# Patient Record
Sex: FEMALE | Race: WHITE | Employment: FULL TIME | URBAN - METROPOLITAN AREA
[De-identification: names, ages, dates, MRNs, and addresses within clinical notes are randomized per-mention and may not be internally consistent; named-entity substitution may affect disease eponyms.]

---

## 2017-01-20 ENCOUNTER — HOSPITAL ENCOUNTER (OUTPATIENT)
Dept: PHYSICAL THERAPY | Age: 47
Discharge: HOME | End: 2017-01-20
Payer: COMMERCIAL

## 2017-01-20 PROCEDURE — 97162 PT EVAL MOD COMPLEX 30 MIN: CPT

## 2017-01-20 PROCEDURE — 97530 THERAPEUTIC ACTIVITIES: CPT

## 2017-01-20 NOTE — PROGRESS NOTES
PT DAILY TREATMENT NOTE/HIP EVAL3-16    Patient Name: Terrie Gudino  Date:2017  : 1970  [x]  Patient  Verified  Payor: BLUE CROSS / Plan: Christina Cavazos [id number] PPO / Product Type: PPO /    In time:3:24pm  Out time:3:53pm  Total Treatment Time (min): 29  Total Timed Codes (min): 9  1:1 Treatment Time ( only): NA   Visit #: 1     Treatment Area: Fibromyalgia [M79.7]    SUBJECTIVE  Pain Level (0-10 scale): 7-8  [x]constant []intermittent []improving [x]worsening []no change since onset    Any medication changes, allergies to medications, adverse drug reactions, diagnosis change, or new procedure performed?: [x] No    [] Yes (see summary sheet for update)  Subjective functional status/changes: Reports increase in pain post lumbar fusion after a fall in  with progression to widespread pain. Reports recent diagnosis of fibromyalgia per her PCP in . Reports she is also seeing pain management. Has undergone multiple rounds of PT in the past for widespread pain but reports this is before she was officially diagnosed with fibromyalgia. Reports she now is on a prednisone dose pack which has taken some pain off of recent flare up over the last several months. Reports R now she has pain throughout both hands limiting ease of writing, burning pain in her buttocks, pain with breathing, \"every piece of my body is in pain\". Reports exhaustion and inability to sleep.        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 Barriers: [x]pain [x]financial [x]time []transportation []other  Motivation: Reports desire to be able to care and involve her self in activity with children. Also reports she is in process of getting disability as she has had too much difficulty working d/t pain. Substance use: []Alcohol []Tobacco []other:   FABQ Score: []low []elevate  Cognition: A & O x 4    Other:    OBJECTIVE/EXAMINATION  Domestic Life: see above  Activity/Recreational Limitations: see above  Mobility: see above  Self Care: see above    20 min [x]Eval                  []Re-Eval     9 min Therapeutic Activity:  []  See flow sheet :   Rationale: pt education regarding relevant diagnosis, prognosis, plan of care, extensive education regarding gradual progression of exercise predominantly focusing on aerobic activity to help address paibn control in setting of fibromyalgia  to improve the patients ability to improve pt self management.           With   [] TE   [] TA   [] neuro   [] other: Patient Education: [x] Review HEP    [] Progressed/Changed HEP based on:   [] positioning   [] body mechanics   [] transfers   [] heat/ice application    [] other:      Other Objective/Functional Measures:     Physical Therapy Evaluation- Hip    Posture:    Gait:  [x] Normal    [] Abnormal    [] Antalgic    [] NWB    Device:    Describe:         ROM/Strength        AROM                     PROM        Strength (1-5)  Hip Left Right Left Right Left Right   Flexion         Extension         Abduction         Adduction         ER         IR         Knee Left Right Left Right Left Right   Extension         Flexion         Bilat LE and UE strength grossly 4+/5 with WNL ankle, knee, hip, and trunk ROM, but reports frequent pain and cramping during gross assessment     Flexibility: [] Unable to assess at this time  Hamstrings:    (L) Tightness= [x] WNL   [] Min   [] Mod   [] Severe    (R) Tightness= [x] WNL   [] Min   [] Mod   [] Severe  Quadriceps:    (L) Tightness= [x] WNL   [] Min   [] Mod   [] Severe    (R) Tightness= [x] WNL   [] Min   [] Mod   [] Severe  Gastroc:    (L) Tightness= [x] WNL   [] Min   [] Mod   [] Severe    (R) Tightness= [x] WNL   [] Min   [] Mod   [] Severe                                  Palpation  [] Min  [] Mod  [] Severe    Location: widespread moderate to severe TTP, non-specific, bilat LE, UE and trunk  [] Min  [] Mod  [] Severe    Location:  [] Min  [] Mod  [] Severe    Location:    Optional Tests  Jae/ Jodee Test: [x] Neg    [] Pos  Merrick Test:  [] Neg    [] Pos  Scouring Test: [] Neg    [] Pos  Trendelenberg:  [] Neg    [] Pos [] Left    [] Right  OberTest:   [] Neg    [] Pos  Ely's Test:  [] Neg    [] Pos  Piriformis Test:  [] Neg    [] Pos hip pain provocation bilat  Sub-talor alignment: [] Neurtral [] Pronation [] Supination  Forefoot alignment: [] Neutral [] Varus [] Valgus  Functional Leg Length (cm):   Right:  Left:  Discrepancy:    Other tests/ comments:  5x sit to stand: 24 seconds with reports of leg fatigue     WNL gait pattern    Pain Level (0-10 scale) post treatment: 7-8    ASSESSMENT/Changes in Function: Per POC. Patient will continue to benefit from skilled PT services to modify and progress therapeutic interventions, address functional mobility deficits, address strength deficits, analyze and cue movement patterns, analyze and modify body mechanics/ergonomics and instruct in home and community integration to attain remaining goals. [x]  See Plan of Care  []  See progress note/recertification  []  See Discharge Summary         Progress towards goals / Updated goals:  Per POC.     PLAN  [x]  Upgrade activities as tolerated     [x]  Continue plan of care  []  Update interventions per flow sheet       []  Discharge due to:_  []  Other:_      Amando Blackwell, PT, DPT, ATC, CSCS 1/20/2017  3:23 PM

## 2017-01-20 NOTE — PROGRESS NOTES
[facility] at [facility], [address] [place] [address]  Phone: [phone]   Fax: [phone]    Plan of Care/ Statement of Necessity for Physical Therapy Services     Patient name: Liliya Connolly Start of Care: 2017   Referral source: Danyell Gray,* : 1970    Medical Diagnosis: Fibromyalgia [M79.7]   Onset Date:, exacerbation over the last few year, mostly the last few months    Treatment Diagnosis: fibromyalgia   Prior Hospitalization: see medical history Provider#: [id number]   Medications: Verified on Patient summary List    Comorbidities: fibromyalgia, psoriatic arthritis, osteoarthritis, Fusion L4-L5 after fall off pogo stick in , 3x surgeries in R foot/ankle with bunionectomy and surgery involving her medial arch and R ankle (unspecified), latex allergy, mitral valve prolapse with PVC   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 Assessment/ key information: Pt is a 48year old female presenting with c/o widespread pain reporting \"every piece of my body is in pain\" that has worsened since initial onset in  reporting recent flare up over the last few months. Pt reports she has been diagnosed with Fibromyalgia and c/o widespread pain, fatigue, limited activity tolerance d/t pain, and exacerbation of pain with work and taking care of her children. She demonstrates grossly WNL strength and ROM during examination, but demonstrates difficulty with sit to stand transfers d/t reported fatigue demonstrating score of 24 seconds and reports pain typically 7-8/10. Pt will benefit from skilled PT to begin with aquatics and progress at therapists discretion to improve activity tolerance, reduce pain, and improve QOL.     Evaluation Complexity History MEDIUM  Complexity : 1-2 comorbidities / personal factors will impact the outcome/ POC ; Examination LOW Complexity : 1-2 Standardized tests and measures addressing body structure, function, activity limitation and / or participation in recreation  ;Presentation MEDIUM Complexity : Evolving with changing characteristics  ; Clinical Decision Making MEDIUM Complexity : FOTO score of 26-74  Overall Complexity Rating: MEDIUM  Problem List: pain affecting function, decrease ADL/ functional abilitiies, decrease activity tolerance and decrease transfer abilities   Treatment Plan may include any combination of the following: Therapeutic exercise, Therapeutic activities, Neuromuscular re-education, Physical agent/modality, Manual therapy, Aquatic therapy, Patient education and Self Care training  Patient / Family readiness to learn indicated by: asking questions and interest  Persons(s) to be included in education: patient (P)  Barriers to Learning/Limitations: None  Patient Goal (s): less pain  Patient Self Reported Health Status: poor to fair  Rehabilitation Potential: fair    Short Term Goals: To be accomplished in 2 weeks:   1. Pt will demonstrate ability to tolerate full aquatic therapy session without pain increase to facilitate participation and progression of therapeutic interventions and improve activity tolerance. 2. Pt will complete FOTO to establish self reported function outcome goal.  Long Term Goals: To be accomplished in 4 weeks:   1. Patient will report FOTO score of expected outcome or better to indicate significant improvement in functional status. 2. Pt will report pain consistently 5/10 or less to improve QOL and ease of work tolerance. 3. Pt will demonstrate 5x sit to stand score of 12 seconds or better to indicate improved functional LE strength and activity tolerance. 4. Pt will be independent with HEP interventions to facilitate self management.   Frequency / Duration: Patient to be seen 2-3 times per week for 4 weeks. Patient/ Caregiver education and instruction: Diagnosis, prognosis, self care and activity modification   [x]  Plan of care has been reviewed with PTA    Caroline Urbano, PT, DPT, ATC, CSCS 1/20/2017 5:00 PM  _____________________________________________________________________  I certify that the above Therapy Services are being furnished while the patient is under my care. I agree with the treatment plan and certify that this therapy is necessary.     Physician's Signature:____________________  Date:__________Time:______    Please sign and return to [facility] at [address] [place] [address]  Phone: [phone]   Fax: [phone]

## 2017-02-15 ENCOUNTER — HOSPITAL ENCOUNTER (OUTPATIENT)
Dept: PHYSICAL THERAPY | Age: 47
Discharge: HOME | End: 2017-02-15
Payer: COMMERCIAL

## 2017-02-15 PROCEDURE — 97113 AQUATIC THERAPY/EXERCISES: CPT

## 2017-02-15 NOTE — PROGRESS NOTES
PT DAILY TREATMENT NOTE     Patient Name: Liliya Connolly  Date:2/15/2017  : 1970  [x]  Patient  Verified  Payor: BLUE CROSS / Plan: [facility] PPO / Product Type: PPO /    In time:1343  Out time:1406  Total Treatment Time (min): 23  Visit #: 2 of 8    Treatment Area: Fibromyalgia [M79.7]    SUBJECTIVE  Pain Level (0-10 scale): 5/10  Any medication changes, allergies to medications, adverse drug reactions, diagnosis change, or new procedure performed?: [x] No    [] Yes (see summary sheet for update)  Subjective functional status/changes:   [] No changes reported  I can barely survive the week of work and then it takes all weekend to recover. Reviewed eval prior to tx.   OBJECTIVE    Modality rationale:    Min Type Additional Details    [] Estim:  []Unatt       []IFC  []Premod                        []Other:  []w/ice   []w/heat  Position:  Location:    [] Estim: []Att    []TENS instruct  []NMES                    []Other:  []w/US   []w/ice   []w/heat  Position:  Location:    []  Traction: [] Cervical       []Lumbar                       [] Prone          []Supine                       []Intermittent   []Continuous Lbs:  [] before manual  [] after manual    []  Ultrasound: []Continuous   [] Pulsed                           []1MHz   []3MHz W/cm2:  Location:    []  Iontophoresis with dexamethasone         Location: [] Take home patch   [] In clinic    []  Ice     []  heat  []  Ice massage  []  Laser   []  Anodyne Position:  Location:    []  Laser with stim  []  Other:  Position:  Location:    []  Vasopneumatic Device Pressure:       [] lo [] med [] hi   Temperature: [] lo [] med [] hi   [] Skin assessment post-treatment:  []intact []redness- no adverse reaction    []redness  adverse reaction:      min []Eval                  []Re-Eval       23 min Therapeutic Exercise:  [x] See flow sheet :aquatic therex per f/s   Rationale: increase ROM, increase strength and improve coordination to improve the patients ability to improve ADL's and self management of sx     min Therapeutic Activity:  []  See flow sheet :          min Neuromuscular Re-education:  []  See flow sheet :        min Manual Therapy:          min Gait Training:  ___ feet with ___ device on level surfaces with ___ level of assist   Rationale: With   [] TE   [] TA   [] neuro   [] other: Patient Education: [x] Review HEP Issued handout for aquatic HEP    [] Progressed/Changed HEP based on:   [] positioning   [] body mechanics   [] transfers   [] heat/ice application    [] other:      Other Objective/Functional Measures: FOTO: 45 (baseline)     Pain Level (0-10 scale) post treatment: 5/10    ASSESSMENT/Changes in Function: Pt demonstrated fair tolerance for first aquatic session today. Patient will continue to benefit from skilled PT services to modify and progress therapeutic interventions, address functional mobility deficits, address ROM deficits, address strength deficits, analyze and address soft tissue restrictions, analyze and cue movement patterns, analyze and modify body mechanics/ergonomics and instruct in home and community integration to attain remaining goals. [x]  See Plan of Care  []  See progress note/recertification  []  See Discharge Summary         Progress towards goals / Updated goals:  Short Term Goals: To be accomplished in 2 weeks:  1. Pt will demonstrate ability to tolerate full aquatic therapy session without pain increase to facilitate participation and progression of therapeutic interventions and improve activity tolerance. Status at [facility]: not issued  Current: Aquatic HEP issued today. Fairly good tolerance for first aquatic session today although trunkated sec to pt arriving late. 2. Pt will complete FOTO to establish self reported function outcome goal.  Current: met:45/100 baseline FOTO score    Long Term Goals: To be accomplished in 4 weeks:  1.  Patient will report FOTO score of expected outcome or better to indicate significant improvement in functional status. Current:retest every 5 visits. 2. Pt will report pain consistently 5/10 or less to improve QOL and ease of work tolerance. Current: 5/10 reported today    3. Pt will demonstrate 5x sit to stand score of 12 seconds or better to indicate improved functional LE strength and activity tolerance. Current: no change    4.  Pt will be independent with HEP interventions to facilitate self management  Current: Aquatic HEP handout issued today    PLAN  [x]  Upgrade activities as tolerated     [x]  Continue plan of care  []  Update interventions per flow sheet       []  Discharge due to:_  []  Other:_      Mayra Wynn PTA 2/15/2017  4:12 PM    Future Appointments  Date Time Provider Georges Maradiaga   2/17/2017 1:30 PM FELECIA Hinkle [facility]   2/22/2017 1:30 PM FELECIA Hinkle [facility]   2/24/2017 1:30 PM FELECIA HinkleHPSAIGE [facility]   3/1/2017 1:30 PM FELECIA HinkleTJOHN [facility]   3/3/2017 1:30 PM FELECIA Hinlke [facility]

## 2017-02-17 ENCOUNTER — HOSPITAL ENCOUNTER (OUTPATIENT)
Dept: PHYSICAL THERAPY | Age: 47
Discharge: HOME | End: 2017-02-17
Payer: COMMERCIAL

## 2017-02-17 PROCEDURE — 97113 AQUATIC THERAPY/EXERCISES: CPT

## 2017-02-17 NOTE — PROGRESS NOTES
PT DAILY TREATMENT NOTE     Patient Name: Abby Freed  Date:2017  : 1970  [x]  Patient  Verified  Payor: BLUE CROSS / Plan: [facility] PPO / Product Type: PPO /    In time:1325  Out time:1403  Total Treatment Time (min): 38  Visit #: 3 of 8    Treatment Area: Fibromyalgia [M79.7]    SUBJECTIVE  Pain Level (0-10 scale): 7.5/10  Any medication changes, allergies to medications, adverse drug reactions, diagnosis change, or new procedure performed?: [x] No    [] Yes (see summary sheet for update)  Subjective functional status/changes:   [] No changes reported  I was hurting since last visit.     OBJECTIVE    Modality rationale:    Min Type Additional Details    [] Estim:  []Unatt       []IFC  []Premod                        []Other:  []w/ice   []w/heat  Position:  Location:    [] Estim: []Att    []TENS instruct  []NMES                    []Other:  []w/US   []w/ice   []w/heat  Position:  Location:    []  Traction: [] Cervical       []Lumbar                       [] Prone          []Supine                       []Intermittent   []Continuous Lbs:  [] before manual  [] after manual    []  Ultrasound: []Continuous   [] Pulsed                           []1MHz   []3MHz W/cm2:  Location:    []  Iontophoresis with dexamethasone         Location: [] Take home patch   [] In clinic    []  Ice     []  heat  []  Ice massage  []  Laser   []  Anodyne Position:  Location:    []  Laser with stim  []  Other:  Position:  Location:    []  Vasopneumatic Device Pressure:       [] lo [] med [] hi   Temperature: [] lo [] med [] hi   [] Skin assessment post-treatment:  []intact []redness- no adverse reaction    []redness  adverse reaction:      min []Eval                  []Re-Eval       38 min Therapeutic Exercise:  [x] See flow sheet :   Rationale: increase ROM, increase strength and improve coordination to improve the patients ability to improve ADL's     min Therapeutic Activity:  []  See flow sheet : min Neuromuscular Re-education:  []  See flow sheet :        min Manual Therapy:          min Gait Training:  ___ feet with ___ device on level surfaces with ___ level of assist   Rationale: With   [] TE   [] TA   [] neuro   [] other: Patient Education: [x] Review HEP    [] Progressed/Changed HEP based on:   [] positioning   [] body mechanics   [] transfers   [] heat/ice application    [] other:      Other Objective/Functional Measures:      Pain Level (0-10 scale) post treatment: 6/10    ASSESSMENT/Changes in Function: No new changes. Limited tolerance for all activity. Patient will continue to benefit from skilled PT services to modify and progress therapeutic interventions, address functional mobility deficits, address ROM deficits, address strength deficits, analyze and address soft tissue restrictions, analyze and cue movement patterns, analyze and modify body mechanics/ergonomics and instruct in home and community integration to attain remaining goals. [x]  See Plan of Care  []  See progress note/recertification  []  See Discharge Summary             PLAN  []  Upgrade activities as tolerated     []  Continue plan of care  []  Update interventions per flow sheet       []  Discharge due to:_  [x]  Other:Re-eval next visit to continue aquatics.       Hardeep Soriano PTA 2/17/2017  4:06 PM    Future Appointments  Date Time Provider Georges Maradiaga   2/20/2017 2:30 PM Cezar Miller [facility]   2/22/2017 1:30 PM FELECIA Calderon [facility]   3/1/2017 1:30 PM FELECIA Calderon [facility]   3/3/2017 1:30 PM FELECIA Calderon [facility]

## 2017-02-20 ENCOUNTER — HOSPITAL ENCOUNTER (OUTPATIENT)
Dept: PHYSICAL THERAPY | Age: 47
Discharge: HOME | End: 2017-02-20
Payer: COMMERCIAL

## 2017-02-20 PROCEDURE — 97164 PT RE-EVAL EST PLAN CARE: CPT

## 2017-02-20 NOTE — PROGRESS NOTES
[facility] at [address] [place] [address]  Phone: [phone]   Fax: [phone]    Progress Note  Patient name: Perez Castillo Start of Care: 17   Referral source: Olga Wilson,* : 1970   Medical/Treatment Diagnosis: Fibromyalgia [M79.7] Onset Date:, exacerbation over the last few year, mostly the last few months      Prior Hospitalization: see medical history Provider#: [id number]   Medications: Verified on Patient Summary List    Comorbidities: fibromyalgia, psoriatic arthritis, osteoarthritis, Fusion L4-L5 after fall off pogo stick in , 3x surgeries in R foot/ankle with bunionectomy and surgery involving her medial arch and R ankle (unspecified), latex allergy, mitral valve prolapse with PVC  Prior Level of Function:limited activity tolerance d/t widespread pain, teacher, 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 Pt only seen for 2 aquatic sessions over the past month due to schedule conflicts and/or pt forgetting about appointments. Pt to continue with aquatic PT as this time and in agreement to be consistently seen 2x/wk. Short Term Goals: To be accomplished in 2 weeks:  1. Pt will demonstrate ability to tolerate full aquatic therapy session without pain increase to facilitate participation and progression of therapeutic interventions and improve activity tolerance.   Status at [facility]: not issued  Current Status: progressing: pt has only had 2 aquatic sessions over the past month and 1 of the 2 session was limited due to pt arriving late.     Current: Aquatic HEP issued today. Fairly good tolerance for first aquatic session today although trunkated sec to pt arriving late. 2. Pt will complete FOTO to establish self reported function outcome goal.  Current: met:45/100 baseline FOTO score      Long Term Goals: To be accomplished in 4 weeks:  1. Patient will report FOTO score of expected outcome or better to indicate significant improvement in functional status. Current:retest every 5 visits.     2. Pt will report pain consistently 5/10 or less to improve QOL and ease of work tolerance. Current: not met: 8/10 upper body, 6/10 lower body today      3. Pt will demonstrate 5x sit to stand score of 12 seconds or better to indicate improved functional LE strength and activity tolerance. Current: not met: 25 seconds      4. Pt will be independent with HEP interventions to facilitate self management  Current: Aquatic HEP handout issued today     Updated Goals: to be achieved in 4 weeks:  Continue with unmet goals above.     ASSESSMENT/RECOMMENDATIONS:  []Continue therapy per initial plan/protocol at a frequency of  2 x per week for 4 weeks  []Continue therapy with the following recommended changes:_____________________      _____________________________________________________________________  []Discontinue therapy progressing towards or have reached established goals  []Discontinue therapy due to lack of appreciable progress towards goals  []Discontinue therapy due to lack of attendance or compliance  []Await Physician's recommendations/decisions regarding therapy  []Other:________________________________________________________________    Thank you for this referral.   Liliana Chandler, PT 2/20/2017 2:50 PM  NOTE TO PHYSICIAN:  PLEASE COMPLETE THE ORDERS BELOW AND   FAX TO [facility]: [phone]  If you are unable to process this request in 24 hours please contact our office:   [phone]  []  I have read the above report and request that my patient continue as recommended. []  I have read the above report and request that my patient continue therapy with the following changes/special instructions:________________________________________  []I have read the above report and request that my patient be discharged from therapy.     Physicians signature: ______________________________Date: ______Time:______

## 2017-02-20 NOTE — PROGRESS NOTES
PT DAILY TREATMENT NOTE     Patient Name: Ana Castanon  Date:2017  : 1970  [x]  Patient  Verified  Payor: BLUE CROSS / Plan: [facility] PPO / Product Type: PPO /    In time:230  Out time:245  Total Treatment Time (min): 15  Visit #: 4 of 8    Treatment Area: Fibromyalgia [M79.7]    SUBJECTIVE  Pain Level (0-10 scale): 8/10 hands, elbows, shoulders; 6/10 hips and feet  Recent min pain level: 5/10  Any medication changes, allergies to medications, adverse drug reactions, diagnosis change, or new procedure performed?: [x] No    [] Yes (see summary sheet for update)  Subjective functional status/changes:   [] No changes reported  The upper half of me is bad today. I will F/U with pain management doctor (referring MD) in March.     OBJECTIVE    Modality rationale:    Min Type Additional Details    [] Estim:  []Unatt       []IFC  []Premod                        []Other:  []w/ice   []w/heat  Position:  Location:    [] Estim: []Att    []TENS instruct  []NMES                    []Other:  []w/US   []w/ice   []w/heat  Position:  Location:    []  Traction: [] Cervical       []Lumbar                       [] Prone          []Supine                       []Intermittent   []Continuous Lbs:  [] before manual  [] after manual    []  Ultrasound: []Continuous   [] Pulsed                           []1MHz   []3MHz W/cm2:  Location:    []  Iontophoresis with dexamethasone         Location: [] Take home patch   [] In clinic    []  Ice     []  heat  []  Ice massage  []  Laser   []  Anodyne Position:  Location:    []  Laser with stim  []  Other:  Position:  Location:    []  Vasopneumatic Device Pressure:       [] lo [] med [] hi   Temperature: [] lo [] med [] hi   [] Skin assessment post-treatment:  []intact []redness- no adverse reaction    []redness  adverse reaction:     15 min []Eval                  [x]Re-Eval        min Therapeutic Exercise:  [] See flow sheet :        min Therapeutic Activity:  [] See flow sheet :         min Neuromuscular Re-education:  []  See flow sheet :        min Manual Therapy:         min Gait Training:  ___ feet with ___ device on level surfaces with ___ level of assist   Rationale: With   [] TE   [] TA   [] neuro   [] other: Patient Education: [x] Review HEP    [] Progressed/Changed HEP based on:   [] positioning   [] body mechanics   [] transfers   [] heat/ice application    [] other:      Other Objective/Functional Measures: see goal review     Pain Level (0-10 scale) post treatment: 8/10 upper body, 6/10 lower body    ASSESSMENT/Changes in Function: Moderate to high pain levels persist.  Pt only seen for 2 aquatic sessions over the past month due to schedule conflicts and/or pt forgetting about appointments. Pt to continue with aquatic PT as this time and in agreement to be consistently seen 2x/wk. Patient will continue to benefit from skilled PT services to modify and progress therapeutic interventions, address functional mobility deficits, address ROM deficits, address strength deficits, analyze and address soft tissue restrictions, analyze and cue movement patterns, analyze and modify body mechanics/ergonomics and assess and modify postural abnormalities to attain remaining goals. []  See Plan of Care  [x]  See progress note/recertification  []  See Discharge Summary         Progress towards goals / Updated goals:  Short Term Goals: To be accomplished in 2 weeks:  1. Pt will demonstrate ability to tolerate full aquatic therapy session without pain increase to facilitate participation and progression of therapeutic interventions and improve activity tolerance. Status at eval: not issued  Current Status: progressing: pt has only had 2 aquatic sessions over the past month and 1 of the 2 session was limited due to pt arriving late. Current: Aquatic HEP issued today.  Fairly good tolerance for first aquatic session today although trunkated sec to pt arriving late.  2. Pt will complete FOTO to establish self reported function outcome goal.  Current: met:45/100 baseline FOTO score     Long Term Goals: To be accomplished in 4 weeks:  1. Patient will report FOTO score of expected outcome or better to indicate significant improvement in functional status. Current:retest every 5 visits. 2. Pt will report pain consistently 5/10 or less to improve QOL and ease of work tolerance. Current: not met: 8/10 upper body, 6/10 lower body today     3. Pt will demonstrate 5x sit to stand score of 12 seconds or better to indicate improved functional LE strength and activity tolerance. Current: not met: 25 seconds     4.  Pt will be independent with HEP interventions to facilitate self management  Current: Aquatic HEP handout issued today       PLAN  [x]  Upgrade activities as tolerated     [x]  Continue plan of care  []  Update interventions per flow sheet       []  Discharge due to:_  []  Other:_      Frank Goldsmith PT 2/20/2017  2:29 PM    Future Appointments  Date Time Provider Georges Maradiaga   2/20/2017 2:30 PM Cezar Prieto [facility]   2/22/2017 1:30 PM FELECIA Gabriel [facility]   3/1/2017 1:30 PM FELECIA Gabriel THE [facility]   3/3/2017 1:30 PM FELECIA Gabriel THE [facility]

## 2017-02-22 ENCOUNTER — HOSPITAL ENCOUNTER (OUTPATIENT)
Dept: PHYSICAL THERAPY | Age: 47
Discharge: HOME | End: 2017-02-22
Payer: COMMERCIAL

## 2017-02-22 PROCEDURE — 97113 AQUATIC THERAPY/EXERCISES: CPT

## 2017-02-22 NOTE — PROGRESS NOTES
PT DAILY TREATMENT NOTE     Patient Name: Mer Batch  Date:2017  : 1970  [x]  Patient  Verified  Payor: BLUE CROSS / Plan: [facility] PPO / Product Type: PPO /    In time:1340  Out time:1410  Total Treatment Time (min): 30  Visit #: 5 of 12    Treatment Area: Fibromyalgia [M79.7]    SUBJECTIVE  Pain Level (0-10 scale): 7-8/10  Any medication changes, allergies to medications, adverse drug reactions, diagnosis change, or new procedure performed?: [x] No    [] Yes (see summary sheet for update)  Subjective functional status/changes:   [] No changes reported  My upper body is hurting more today. I am just worn out by mid week.     OBJECTIVE    Modality rationale:    Min Type Additional Details    [] Estim:  []Unatt       []IFC  []Premod                        []Other:  []w/ice   []w/heat  Position:  Location:    [] Estim: []Att    []TENS instruct  []NMES                    []Other:  []w/US   []w/ice   []w/heat  Position:  Location:    []  Traction: [] Cervical       []Lumbar                       [] Prone          []Supine                       []Intermittent   []Continuous Lbs:  [] before manual  [] after manual    []  Ultrasound: []Continuous   [] Pulsed                           []1MHz   []3MHz W/cm2:  Location:    []  Iontophoresis with dexamethasone         Location: [] Take home patch   [] In clinic    []  Ice     []  heat  []  Ice massage  []  Laser   []  Anodyne Position:  Location:    []  Laser with stim  []  Other:  Position:  Location:    []  Vasopneumatic Device Pressure:       [] lo [] med [] hi   Temperature: [] lo [] med [] hi   [] Skin assessment post-treatment:  []intact []redness- no adverse reaction    []redness  adverse reaction:      min []Eval                  []Re-Eval       30 min Therapeutic Exercise:  [x] See flow sheet :shallow and deep water therex   Rationale: increase ROM, increase strength, improve coordination, improve balance and increase proprioception to improve the patients ability to improve ADL's and distract pain cycle     min Therapeutic Activity:  []  See flow sheet :         min Neuromuscular Re-education:  []  See flow sheet :      min Manual Therapy:          min Gait Training:  ___ feet with ___ device on level surfaces with ___ level of assist   Rationale: With   [] TE   [] TA   [] neuro   [] other: Patient Education: [x] Review HEP    [] Progressed/Changed HEP based on:   [] positioning   [] body mechanics   [] transfers   [] heat/ice application    [] other:      Other Objective/Functional Measures: see goal review     Pain Level (0-10 scale) post treatment: 6/10    ASSESSMENT/Changes in Function: Pt reports some relief in the water but is troubled with LE cramping with therex. Patient will continue to benefit from skilled PT services to modify and progress therapeutic interventions, address functional mobility deficits, address ROM deficits, address strength deficits, analyze and address soft tissue restrictions, analyze and cue movement patterns, analyze and modify body mechanics/ergonomics and instruct in home and community integration to attain remaining goals. []  See Plan of Care  [x]  See progress note/recertification  []  See Discharge Summary         Progress towards goals / Updated goals:  Short Term Goals: To be accomplished in 2 weeks:  1. Pt will demonstrate ability to tolerate full aquatic therapy session without pain increase to facilitate participation and progression of therapeutic interventions and improve activity tolerance. Status at eval: not issued  Status at PN: progressing: pt has only had 2 aquatic sessions over the past month and 1 of the 2 session was limited due to pt arriving late. Aquatic HEP issued. Current: fair tolerance for full aquatic session today with LE cramping reported         Long Term Goals: To be accomplished in 4 weeks:  1.  Patient will report FOTO score of expected outcome or better to indicate significant improvement in functional status. Status at PN: not retested sec to limited visits. Current:retest in 2 visits.      2. Pt will report pain consistently 5/10 or less to improve QOL and ease of work tolerance. Status at PN: not met: 8/10 upper body, 6/10 lower body today  Current: no change      3. Pt will demonstrate 5x sit to stand score of 12 seconds or better to indicate improved functional LE strength and activity tolerance. Status at PN: not met: 25 seconds  Current: no change      4.  Pt will be independent with HEP interventions to facilitate self management  Status at PN: Aquatic HEP handout issued today  Current: Pt not independent at this time.       PLAN  []  Upgrade activities as tolerated     [x]  Continue plan of care  []  Update interventions per flow sheet       []  Discharge due to:_  []  Other:_      Omaira Paiz PTA 2/22/2017  3:24 PM    Future Appointments  Date Time Provider Georges Maradiaga   3/1/2017 1:30 PM FELECIA Ann THE [facility]   3/3/2017 1:30 PM FELECIA Ann THE [facility]   3/8/2017 1:30 PM FELECIA Ann THE [facility]   3/10/2017 1:30 PM FELECIA Ann THE [facility]   3/15/2017 1:30 PM FELECIA Ann THE [facility]

## 2017-02-24 ENCOUNTER — APPOINTMENT (OUTPATIENT)
Dept: PHYSICAL THERAPY | Age: 47
End: 2017-02-24
Payer: COMMERCIAL

## 2017-03-01 ENCOUNTER — HOSPITAL ENCOUNTER (OUTPATIENT)
Dept: PHYSICAL THERAPY | Age: 47
Discharge: HOME | End: 2017-03-01
Payer: COMMERCIAL

## 2017-03-01 PROCEDURE — 97113 AQUATIC THERAPY/EXERCISES: CPT

## 2017-03-01 NOTE — PROGRESS NOTES
PT DAILY TREATMENT NOTE     Patient Name: Socorro Rodrigez  Date:3/1/2017  : 1970  [x]  Patient  Verified  Payor: BLUE CROSS / Plan: [facility] PPO / Product Type: PPO /    In time:1330  Out time:1410  Total Treatment Time (min): 40  Visit #: 6 of 12    Treatment Area: Fibromyalgia [M79.7]    SUBJECTIVE  Pain Level (0-10 scale): 6-8/10  Any medication changes, allergies to medications, adverse drug reactions, diagnosis change, or new procedure performed?: [x] No    [] Yes (see summary sheet for update)  Subjective functional status/changes:   [] No changes reported  I am really sensitive to touch and feel burning pain for minutes when someone touches me.     OBJECTIVE    Modality rationale:    Min Type Additional Details    [] Estim:  []Unatt       []IFC  []Premod                        []Other:  []w/ice   []w/heat  Position:  Location:    [] Estim: []Att    []TENS instruct  []NMES                    []Other:  []w/US   []w/ice   []w/heat  Position:  Location:    []  Traction: [] Cervical       []Lumbar                       [] Prone          []Supine                       []Intermittent   []Continuous Lbs:  [] before manual  [] after manual    []  Ultrasound: []Continuous   [] Pulsed                           []1MHz   []3MHz W/cm2:  Location:    []  Iontophoresis with dexamethasone         Location: [] Take home patch   [] In clinic    []  Ice     []  heat  []  Ice massage  []  Laser   []  Anodyne Position:  Location:    []  Laser with stim  []  Other:  Position:  Location:    []  Vasopneumatic Device Pressure:       [] lo [] med [] hi   Temperature: [] lo [] med [] hi   [] Skin assessment post-treatment:  []intact []redness- no adverse reaction    []redness  adverse reaction:      min []Eval                  []Re-Eval       40 min Therapeutic Exercise:  [x] See flow sheet :shallow and deep water therex   Rationale: increase ROM, increase strength, improve coordination and self manage global pain to improve the patients ability to improve ADL's     min Therapeutic Activity:  []  See flow sheet :         min Neuromuscular Re-education:  []  See flow sheet :        min Manual Therapy:          min Gait Training:  ___ feet with ___ device on level surfaces with ___ level of assist   Rationale: With   [] TE   [] TA   [] neuro   [] other: Patient Education: [x] Review HEP    [] Progressed/Changed HEP based on:   [] positioning   [] body mechanics   [] transfers   [] heat/ice application    [] other:      Other Objective/Functional Measures:      Pain Level (0-10 scale) post treatment: 4-5/10    ASSESSMENT/Changes in Function: No new progress at this time. Fairly god tolerance for today's session with no cramping in the deep water today. Patient will continue to benefit from skilled PT services to modify and progress therapeutic interventions, address functional mobility deficits, address ROM deficits, address strength deficits, analyze and address soft tissue restrictions, analyze and cue movement patterns, analyze and modify body mechanics/ergonomics, assess and modify postural abnormalities, address imbalance/dizziness and instruct in home and community integration to attain remaining goals. []  See Plan of Care  [x]  See progress note/recertification. Pt continues to have foot cramping issues in the pool. []  See Discharge Summary         Progress towards goals / Updated goals:  Short Term Goals: To be accomplished in 2 weeks:  1. Pt will demonstrate ability to tolerate full aquatic therapy session without pain increase to facilitate participation and progression of therapeutic interventions and improve activity tolerance. Status at eval: not issued  Status at PN: progressing: pt has only had 2 aquatic sessions over the past month and 1 of the 2 session was limited due to pt arriving late. Aquatic HEP issued.   Current: fair tolerance for full aquatic session today with LE cramping reported          Long Term Goals: To be accomplished in 4 weeks:  1. Patient will report FOTO score of expected outcome or better to indicate significant improvement in functional status. Status at PN: not retested sec to limited visits. Current:retest next visit.      2. Pt will report pain consistently 5/10 or less to improve QOL and ease of work tolerance. Status at PN: not met: 8/10 upper body, 6/10 lower body today  Current: no change      3. Pt will demonstrate 5x sit to stand score of 12 seconds or better to indicate improved functional LE strength and activity tolerance. Status at PN: not met: 25 seconds  Current: no change      4.  Pt will be independent with HEP interventions to facilitate self management  Status at PN: Aquatic HEP handout issued today  Current: Pt not independent at this time.       PLAN  []  Upgrade activities as tolerated     [x]  Continue plan of care  []  Update interventions per flow sheet       []  Discharge due to:_  []  Other:_      Dot Mims PTA 3/1/2017  3:33 PM    Future Appointments  Date Time Provider Georges Maradiaga   3/3/2017 1:30 PM FELECIA Ojeda [facility]   3/8/2017 1:30 PM FELECIA Ojeda [facility]   3/10/2017 1:30 PM FELECIA Ojeda [facility]   3/15/2017 1:30 PM FELECIA Ojeda [facility]

## 2017-03-03 ENCOUNTER — HOSPITAL ENCOUNTER (OUTPATIENT)
Dept: PHYSICAL THERAPY | Age: 47
Discharge: HOME | End: 2017-03-03
Payer: COMMERCIAL

## 2017-03-03 PROCEDURE — 97113 AQUATIC THERAPY/EXERCISES: CPT

## 2017-03-03 NOTE — PROGRESS NOTES
PT DAILY TREATMENT NOTE     Patient Name: Logan Mata  Date:3/3/2017  : 1970  [x]  Patient  Verified  Payor: BLUE CROSS / Plan: [facility] PPO / Product Type: PPO /    In time:1337  Out time:1408  Total Treatment Time (min): 31  Visit #: 7 of 12    Treatment Area: Fibromyalgia [M79.7]    SUBJECTIVE  Pain Level (0-10 scale): 6/10  Any medication changes, allergies to medications, adverse drug reactions, diagnosis change, or new procedure performed?: [x] No    [] Yes (see summary sheet for update)  Subjective functional status/changes:   [] No changes reported  I continue to have trouble with cramping in the pool and at night.     OBJECTIVE    Modality rationale:    Min Type Additional Details    [] Estim:  []Unatt       []IFC  []Premod                        []Other:  []w/ice   []w/heat  Position:  Location:    [] Estim: []Att    []TENS instruct  []NMES                    []Other:  []w/US   []w/ice   []w/heat  Position:  Location:    []  Traction: [] Cervical       []Lumbar                       [] Prone          []Supine                       []Intermittent   []Continuous Lbs:  [] before manual  [] after manual    []  Ultrasound: []Continuous   [] Pulsed                           []1MHz   []3MHz W/cm2:  Location:    []  Iontophoresis with dexamethasone         Location: [] Take home patch   [] In clinic    []  Ice     []  heat  []  Ice massage  []  Laser   []  Anodyne Position:  Location:    []  Laser with stim  []  Other:  Position:  Location:    []  Vasopneumatic Device Pressure:       [] lo [] med [] hi   Temperature: [] lo [] med [] hi   [] Skin assessment post-treatment:  []intact []redness- no adverse reaction    []redness  adverse reaction:      min []Eval                  []Re-Eval       31 min Therapeutic Exercise:  [x] See flow sheet :aquatic therex   Rationale: increase ROM, increase strength and improve coordination to improve the patients ability to improve ADL's     min Therapeutic Activity:  []  See flow sheet :         min Neuromuscular Re-education:  []  See flow sheet :        min Manual Therapy:          min Gait Training:  ___ feet with ___ device on level surfaces with ___ level of assist   Rationale: With   [] TE   [] TA   [] neuro   [] other: Patient Education: [x] Review HEP    [] Progressed/Changed HEP based on:   [] positioning   [] body mechanics   [] transfers   [] heat/ice application    [] other:      Other Objective/Functional Measures: FOTO: 48     Pain Level (0-10 scale) post treatment: 4-5/10    ASSESSMENT/Changes in Function: Pt tolerates aquatic session fairly well although foot and lower leg cramping continue to be problematic. Patient will continue to benefit from skilled PT services to modify and progress therapeutic interventions, address functional mobility deficits, address ROM deficits, address strength deficits, analyze and address soft tissue restrictions, analyze and cue movement patterns, analyze and modify body mechanics/ergonomics and instruct in home and community integration to attain remaining goals. []  See Plan of Care  [x]  See progress note/recertification  []  See Discharge Summary         Progress towards goals / Updated goals:  Short Term Goals: To be accomplished in 2 weeks:  1. Pt will demonstrate ability to tolerate full aquatic therapy session without pain increase to facilitate participation and progression of therapeutic interventions and improve activity tolerance. Status at eval: not issued  Status at PN: progressing: pt has only had 2 aquatic sessions over the past month and 1 of the 2 session was limited due to pt arriving late. Aquatic HEP issued. Current: fair tolerance for full aquatic session today with LE cramping reported          Long Term Goals: To be accomplished in 4 weeks:  1.  Patient will report FOTO score of expected outcome or better to indicate significant improvement in functional status. Status at PN: not retested sec to limited visits. Current:48/100 (improved 3 pts since baseline)      2. Pt will report pain consistently 5/10 or less to improve QOL and ease of work tolerance. Status at PN: not met: 8/10 upper body, 6/10 lower body today  Current: 6/10 pain reported today      3. Pt will demonstrate 5x sit to stand score of 12 seconds or better to indicate improved functional LE strength and activity tolerance. Status at PN: not met: 25 seconds  Current: no change      4.  Pt will be independent with HEP interventions to facilitate self management  Status at PN: Aquatic HEP handout issued today  Current: Pt not independent at this time.       PLAN  []  Upgrade activities as tolerated     []  Continue plan of care  []  Update interventions per flow sheet       []  Discharge due to:_  []  Other:_      Alyce Bond PTA 3/3/2017  3:23 PM    Future Appointments  Date Time Provider Georges Maradiaga   3/8/2017 1:30 PM FELECIA HaskinsTJOHN [facility]   3/10/2017 1:30 PM FELECIA Haskins [facility]   3/15/2017 1:30 PM FELECIA Haskins THE [facility]

## 2017-03-08 ENCOUNTER — HOSPITAL ENCOUNTER (OUTPATIENT)
Dept: PHYSICAL THERAPY | Age: 47
Discharge: HOME | End: 2017-03-08
Payer: COMMERCIAL

## 2017-03-08 PROCEDURE — 97113 AQUATIC THERAPY/EXERCISES: CPT

## 2017-03-08 NOTE — PROGRESS NOTES
PT DAILY TREATMENT NOTE     Patient Name: Parker Aguiar  Date:3/8/2017  : 1970  [x]  Patient  Verified  Payor: BLUE CROSS / Plan: [facility][1] PPO / Product Type: PPO /    In time:1332  Out time:1405  Total Treatment Time (min): 33  Visit #: 8 of 12    Treatment Area: Fibromyalgia [M79.7]    SUBJECTIVE  Pain Level (0-10 scale): 8/10  Any medication changes, allergies to medications, adverse drug reactions, diagnosis change, or new procedure performed?: [x] No    [] Yes (see summary sheet for update)  Subjective functional status/changes:   [] No changes reported  Today has been a really bad day but the water always feels good.     OBJECTIVE    Modality rationale:    Min Type Additional Details    [] Estim:  []Unatt       []IFC  []Premod                        []Other:  []w/ice   []w/heat  Position:  Location:    [] Estim: []Att    []TENS instruct  []NMES                    []Other:  []w/US   []w/ice   []w/heat  Position:  Location:    []  Traction: [] Cervical       []Lumbar                       [] Prone          []Supine                       []Intermittent   []Continuous Lbs:  [] before manual  [] after manual    []  Ultrasound: []Continuous   [] Pulsed                           []1MHz   []3MHz W/cm2:  Location:    []  Iontophoresis with dexamethasone         Location: [] Take home patch   [] In clinic    []  Ice     []  heat  []  Ice massage  []  Laser   []  Anodyne Position:  Location:    []  Laser with stim  []  Other:  Position:  Location:    []  Vasopneumatic Device Pressure:       [] lo [] med [] hi   Temperature: [] lo [] med [] hi   [] Skin assessment post-treatment:  []intact []redness- no adverse reaction    []redness  adverse reaction:      min []Eval                  []Re-Eval       33 min Therapeutic Exercise:  [x] See flow sheet :shallow and deep water therex   Rationale: increase ROM, increase strength and improve coordination to improve the patients ability to improve ADL's     min Therapeutic Activity:  []  See flow sheet :         min Neuromuscular Re-education:  []  See flow sheet :        min Manual Therapy:          min Gait Training:  ___ feet with ___ device on level surfaces with ___ level of assist   Rationale: With   [] TE   [] TA   [] neuro   [] other: Patient Education: [x] Review HEP    [] Progressed/Changed HEP based on:   [] positioning   [] body mechanics   [] transfers   [] heat/ice application    [] other:      Other Objective/Functional Measures:      Pain Level (0-10 scale) post treatment: 8/10    ASSESSMENT/Changes in Function: No new changes withpain unchanged with today's tx. Patient will continue to benefit from skilled PT services to modify and progress therapeutic interventions, address functional mobility deficits, address ROM deficits, address strength deficits, analyze and address soft tissue restrictions, analyze and cue movement patterns, analyze and modify body mechanics/ergonomics, instruct in home and community integration and modulate pain to attain remaining goals.      []  See Plan of Care  [x]  See progress note/recertification  []  See Discharge Summary         PLAN  []  Upgrade activities as tolerated     []  Continue plan of care  []  Update interventions per flow sheet       []  Discharge due to:_  []  Other:_      Humberto Mary PTA 3/8/2017  2:39 PM    Future Appointments  Date Time Provider Georges Maradiaga   3/10/2017 1:30 PM FELECIA Alba [facility]   3/15/2017 1:30 PM FELECIA Alba [facility]

## 2017-03-10 ENCOUNTER — HOSPITAL ENCOUNTER (OUTPATIENT)
Dept: PHYSICAL THERAPY | Age: 47
Discharge: HOME | End: 2017-03-10
Payer: COMMERCIAL

## 2017-03-10 PROCEDURE — 97113 AQUATIC THERAPY/EXERCISES: CPT

## 2017-03-10 NOTE — PROGRESS NOTES
PT DAILY TREATMENT NOTE     Patient Name: Ashleigh Hartmann  Date:3/10/2017  : 1970  [x]  Patient  Verified  Payor: BLUE MAYRA / Plan: [facility] Cav[facility] [id number] PPO / Product Type: PPO /    In time:1335  Out time:1403  Total Treatment Time (min): 28  Visit #: 9 of 12    Treatment Area: Fibromyalgia [M79.7]    SUBJECTIVE  Pain Level (0-10 scale): 8.5/10  Any medication changes, allergies to medications, adverse drug reactions, diagnosis change, or new procedure performed?: [x] No    [] Yes (see summary sheet for update)  Subjective functional status/changes:   [] No changes reported  I tripped over an orange cone today at school. Everything is hurting today!     OBJECTIVE    Modality rationale:    Min Type Additional Details    [] Estim:  []Unatt       []IFC  []Premod                        []Other:  []w/ice   []w/heat  Position:  Location:    [] Estim: []Att    []TENS instruct  []NMES                    []Other:  []w/US   []w/ice   []w/heat  Position:  Location:    []  Traction: [] Cervical       []Lumbar                       [] Prone          []Supine                       []Intermittent   []Continuous Lbs:  [] before manual  [] after manual    []  Ultrasound: []Continuous   [] Pulsed                           []1MHz   []3MHz W/cm2:  Location:    []  Iontophoresis with dexamethasone         Location: [] Take home patch   [] In clinic    []  Ice     []  heat  []  Ice massage  []  Laser   []  Anodyne Position:  Location:    []  Laser with stim  []  Other:  Position:  Location:    []  Vasopneumatic Device Pressure:       [] lo [] med [] hi   Temperature: [] lo [] med [] hi   [] Skin assessment post-treatment:  []intact []redness- no adverse reaction    []redness  adverse reaction:      min []Eval                  []Re-Eval       28 min Therapeutic Exercise:  [x] See flow sheet :shallow and deep water aquatic therex    Rationale: increase ROM, increase strength and improve coordination to improve the patients ability to improve ADL's     min Therapeutic Activity:  []  See flow sheet :         min Neuromuscular Re-education:  []  See flow sheet :        min Manual Therapy:          min Gait Training:  ___ feet with ___ device on level surfaces with ___ level of assist   Rationale: With   [] TE   [] TA   [] neuro   [] other: Patient Education: [x] Review HEP    [] Progressed/Changed HEP based on:   [] positioning   [] body mechanics   [] transfers   [] heat/ice application    [] other:      Other Objective/Functional Measures: see goal review     Pain Level (0-10 scale) post treatment: 8.3/10    ASSESSMENT/Changes in Function: No new changes at this time. Patient will continue to benefit from skilled PT services to modify and progress therapeutic interventions, address functional mobility deficits, address ROM deficits, address strength deficits, analyze and address soft tissue restrictions, analyze and cue movement patterns, analyze and modify body mechanics/ergonomics and instruct in home and community integration to attain remaining goals. []  See Plan of Care  [x]  See progress note/recertification  []  See Discharge Summary         Progress towards goals / Updated goals:  Short Term Goals: To be accomplished in 2 weeks:  1. Pt will demonstrate ability to tolerate full aquatic therapy session without pain increase to facilitate participation and progression of therapeutic interventions and improve activity tolerance. Status at eval: not issued  Status at PN: progressing: pt has only had 2 aquatic sessions over the past month and 1 of the 2 session was limited due to pt arriving late. Aquatic HEP issued. Current: fair tolerance for full aquatic session today with LE cramping reported          Long Term Goals: To be accomplished in 4 weeks:  1. Patient will report FOTO score of expected outcome or better to indicate significant improvement in functional status.   Status at PN: not retested sec to limited visits. Current:48/100 (improved 3 pts since baseline)      2. Pt will report pain consistently 5/10 or less to improve QOL and ease of work tolerance. Status at PN: not met: 8/10 upper body, 6/10 lower body today  Current: 8/10 pain reported today      3. Pt will demonstrate 5x sit to stand score of 12 seconds or better to indicate improved functional LE strength and activity tolerance. Status at PN: not met: 25 seconds  Current: no change      4.  Pt will be independent with HEP interventions to facilitate self management  Status at PN: Aquatic HEP handout issued today  Current: Pt not independent at this time.          PLAN  []  Upgrade activities as tolerated     [x]  Continue plan of care  []  Update interventions per flow sheet       []  Discharge due to:_  []  Other:_      Acosta Uribe PTA 3/10/2017  2:58 PM    Future Appointments  Date Time Provider Georges Maradiaga   3/15/2017 1:30 PM Acosta Uribe PTA [facility]

## 2017-03-31 ENCOUNTER — APPOINTMENT (OUTPATIENT)
Dept: PHYSICAL THERAPY | Age: 47
End: 2017-03-31
Payer: COMMERCIAL

## 2017-04-21 NOTE — PROGRESS NOTES
[facility] at [address] [place] [address]  Phone: [phone]   Fax: [phone]    Discharge Summary    Patient name: Arina West     Start of Care: 17  Referral source: Sherry Lee.,*    : 1970  Medical/Treatment Diagnosis: Fibromyalgia [M79.7]  Onset Date:,exacerbation over the last few year, mostly the last few months   Prior Hospitalization: see medical history   Provider#: [id number]  Comorbidities: fibromyalgia, psoriatic arthritis, osteoarthritis, Fusion L4-L5 after fall off pogo stick in , 3x surgeries in R foot/ankle with bunionectomy and surgery involving her medial arch and R ankle (unspecified), latex allergy, mitral valve prolapse with PVC  Prior Level of Function:limited activity tolerance d/t widespread pain, teacher, 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 Pt will demonstrate ability to tolerate full aquatic therapy session without pain increase to facilitate participation and progression of therapeutic interventions and improve activity tolerance. Status at eval: not issued  Status at PN: progressing: pt has only had 2 aquatic sessions over the past month and 1 of the 2 session was limited due to pt arriving late. Aquatic HEP issued. Current: fair tolerance for full aquatic session today with LE cramping reported      Long Term Goals: To be accomplished in 4 weeks:  1. Patient will report FOTO score of expected outcome or better to indicate significant improvement in functional status. Status at PN: not retested sec to limited visits. Current:48/100 (improved 3 pts since baseline)      2. Pt will report pain consistently 5/10 or less to improve QOL and ease of work tolerance.   Status at PN: not met: 8/10 upper body, 6/10 lower body today  Current: 8/10 pain reported today      3. Pt will demonstrate 5x sit to stand score of 12 seconds or better to indicate improved functional LE strength and activity tolerance. Status at PN: not met: 25 seconds  Current: no change      4. Pt will be independent with HEP interventions to facilitate self management  Status at PN: Aquatic HEP handout issued today  Current: Pt not independent at this time.          Assessment/ Summary of Care: Pt last seen in [place] on 3/10/17 after which time it was determined that we would discharge PT secondary to challenges in ability to attend aquatic appointments on time. It was suggested to pt to trial yoga at this time. No new progress or functional gains to report with pain persisting and LE cramping continuing to be problematic.     RECOMMENDATIONS:  [x]Discontinue therapy: []Patient has reached or is progressing toward set goals      [x]Patient is non-compliant or has abdicated      []Due to lack of appreciable progress towards set goals    Jc Landeros, PT 4/21/2017 12:18 PM

## 2017-08-22 ENCOUNTER — HOSPITAL ENCOUNTER (OUTPATIENT)
Dept: PREADMISSION TESTING | Age: 47
Discharge: HOME | End: 2017-08-22
Payer: COMMERCIAL

## 2017-08-22 VITALS — HEIGHT: 66 IN | WEIGHT: 206.31 LBS | BODY MASS INDEX: 33.16 KG/M2

## 2017-08-22 LAB
ANION GAP SERPL CALC-SCNC: 9 MMOL/L (ref 3–18)
ATRIAL RATE: 76 BPM
BUN SERPL-MCNC: 12 MG/DL (ref 7–18)
BUN/CREAT SERPL: 15 (ref 12–20)
CALCIUM SERPL-MCNC: 9.1 MG/DL (ref 8.5–10.1)
CALCULATED P AXIS, ECG09: 28 DEGREES
CALCULATED R AXIS, ECG10: 7 DEGREES
CALCULATED T AXIS, ECG11: 0 DEGREES
CHLORIDE SERPL-SCNC: 104 MMOL/L (ref 100–108)
CO2 SERPL-SCNC: 27 MMOL/L (ref 21–32)
CREAT SERPL-MCNC: 0.78 MG/DL (ref 0.6–1.3)
DIAGNOSIS, 93000: NORMAL
GLUCOSE SERPL-MCNC: 94 MG/DL (ref 74–99)
HCT VFR BLD AUTO: 38 % (ref 35–45)
HGB BLD-MCNC: 12.5 G/DL (ref 12–16)
P-R INTERVAL, ECG05: 140 MS
POTASSIUM SERPL-SCNC: 4.4 MMOL/L (ref 3.5–5.5)
Q-T INTERVAL, ECG07: 396 MS
QRS DURATION, ECG06: 88 MS
QTC CALCULATION (BEZET), ECG08: 445 MS
SODIUM SERPL-SCNC: 140 MMOL/L (ref 136–145)
VENTRICULAR RATE, ECG03: 76 BPM

## 2017-08-22 PROCEDURE — 93005 ELECTROCARDIOGRAM TRACING: CPT

## 2017-08-22 PROCEDURE — 80048 BASIC METABOLIC PNL TOTAL CA: CPT | Performed by: SURGERY

## 2017-08-22 PROCEDURE — 85018 HEMOGLOBIN: CPT | Performed by: SURGERY

## 2017-08-22 RX ORDER — METOPROLOL SUCCINATE 50 MG/1
50 TABLET, EXTENDED RELEASE ORAL
COMMUNITY
Start: 2013-09-11 | End: 2018-12-21

## 2017-08-22 RX ORDER — SODIUM CHLORIDE, SODIUM LACTATE, POTASSIUM CHLORIDE, CALCIUM CHLORIDE 600; 310; 30; 20 MG/100ML; MG/100ML; MG/100ML; MG/100ML
125 INJECTION, SOLUTION INTRAVENOUS CONTINUOUS
Status: CANCELLED | OUTPATIENT
Start: 2017-08-22

## 2017-08-22 RX ORDER — BISMUTH SUBSALICYLATE 262 MG
1 TABLET,CHEWABLE ORAL DAILY
COMMUNITY
Start: 2014-03-24

## 2017-08-22 RX ORDER — LANOLIN ALCOHOL/MO/W.PET/CERES
1 CREAM (GRAM) TOPICAL DAILY
COMMUNITY
Start: 2015-03-25 | End: 2017-08-22

## 2017-08-22 RX ORDER — OMEPRAZOLE 40 MG/1
40 CAPSULE, DELAYED RELEASE ORAL
COMMUNITY
End: 2018-12-21

## 2017-08-22 RX ORDER — METHOTREXATE 25 MG/ML
INJECTION INTRA-ARTERIAL; INTRAMUSCULAR; INTRATHECAL; INTRAVENOUS
COMMUNITY
End: 2018-12-21

## 2017-08-22 RX ORDER — DULOXETIN HYDROCHLORIDE 30 MG/1
60 CAPSULE, DELAYED RELEASE ORAL DAILY
COMMUNITY
Start: 2015-10-08

## 2017-08-22 RX ORDER — ONDANSETRON 8 MG/1
8 TABLET, ORALLY DISINTEGRATING ORAL
COMMUNITY
End: 2018-12-21

## 2017-08-22 RX ORDER — FLUTICASONE PROPIONATE 50 MCG
2 SPRAY, SUSPENSION (ML) NASAL
COMMUNITY
End: 2021-03-18 | Stop reason: CLARIF

## 2017-08-22 RX ORDER — PREGABALIN 150 MG/1
150 CAPSULE ORAL 2 TIMES DAILY
COMMUNITY
Start: 2016-04-04

## 2017-08-22 RX ORDER — RIZATRIPTAN BENZOATE 10 MG/1
10 TABLET ORAL
COMMUNITY
End: 2018-12-21

## 2017-08-23 ENCOUNTER — ANESTHESIA EVENT (OUTPATIENT)
Dept: SURGERY | Age: 47
End: 2017-08-23
Payer: COMMERCIAL

## 2017-08-23 NOTE — H&P
PATIENT: Ezekiel Govea  YOB: 1970  DATE: 2017 8:45 AM   VISIT TYPE: Consult  _____________________________________________________________    Completed Orders (this encounter)  Order Interpretation Result Next Lab Date   surgery instructions given education        Assessment/Plan  # Detail Type Description    1. Assessment Calculus of gallbladder w/o cholecystitis w/o obstruction (K80.20). Patient Plan  discussed the pathophysiology of gallstone disease, stones will not resolve spontaneousl,y discussed the risks of suregry versus no surgery, discussed choledocolithiasis, gallstone pancreatitis and acute cholecystitis                  This 50year old female presents for Gallbladder Disease. History of Present Illness:  1. Gallbladder Disease      Onset was 1 year ago. Severity: moderate-severe. The problem is worse. It occurs intermittently. Location is epigastric and RUQ. There is radiation to back. The patient describes it as colicky. Context includes after meals. Identified risk factors include diabetes. Symptom is aggravated by fatty foods. Denies relieving factors. Additional information: US 2 weeks ago shows gallstones, nl sized CBD, nl wall thickness, no pericholecystic fluid.                 PROBLEM LIST:  Problem Description Onset Date   Cystitis 2012   Dizziness and giddiness 2012       PAST MEDICAL/SURGICAL HISTORY  (Detailed)    Disease/disorder Onset Date Management Date Comments     Spinal fusion, lumbar 2014 LB 2015 - L4-5/S1 Bulginf Disc      section       pebbles bunionectomy     Mitral valve prolapse w/ PVC       Osteoarthritis       Psoriasis       Fibromyalgia       Headache, migraine         Family History:  (Detailed)  Relationship Family Member Name  Age at Death Condition Onset Age Cause of Death   Daughter  N       Father    Rheumatoid arthritis  N   Maternal grandfather  N  bone cancer  N   Mother  N  Asthma N   Mother  N  Diabetes mellitus  N   Mother  Y  Hypertension  N   Paternal grandfather  N  Coronary artery disease  N       Social History:  (Detailed)  Tobacco use reviewed. The patient is right-handed. Preferred language is [place]. 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 CHILDREN  Has children:  3 daughter(s). [address] [place] [address] status is stable/permanent. The patient lives with  spouse and 3 daughter(s). Tobacco use status: Never smoked tobacco.  Smoking status: Never smoker. SMOKING STATUS  Use Status Type Smoking Status Usage Per Day Years Used Total Pack Years   no/never  Never smoker          No passive smoke exposure. ALCOHOL  There is a history of alcohol use. Type: Wine. 1 glass consumed daily. CAFFEINE  The patient uses caffeine: diet coke occasionally and coffee. - 2 cups a day. HOME ENVIRONMENT/SAFETY  The home has smoke detectors. No carbon monoxide detector at home. There is not a pool/spa at home. The home does not have radon present. Uses seat belts.  EXPERIENCE  Patient has no  experience. Patient Status   Completed with information received for patient transitioning into care. Completed with information received for patient in a summary of care record. Medication Reconciliation  Medications reconciled today.   Medication Reviewed  Adherence Medication Name Sig Desc Elsewhere Status   taking as directed metoprolol succinate ER 50 mg tablet,extended release 24 hr take 3 tablets by oral route every morning, and 3 tablets by oral route every evening Y Verified   taking as directed diclofenac sodium 75 mg tablet,delayed release take 1 tablet by oral route 2 times every day N Verified   taking as directed folic acid 1 mg tablet take 1 tablet by oral route  every day N Verified   taking as directed LYRICA 150MG CAPS TAKE ONE CAPSULE BY MOUTH TWICE A DAY N Verified   taking as directed DULOXETINE HCL 60MG CPEP TAKE ONE CAPSULE BY MOUTH EVERY DAY N Verified   taking as directed ondansetron HCl 8 mg tablet take 1 tablet by oral route  every 8 hours as needed for nausea N Verified   taking as directed rizatriptan 10 mg tablet take 1 tablet by oral route once, may repeat at 2 hour intervals; do not exceed 30 mg in 24 hours N Verified   taking as directed methotrexate sodium 25 mg/mL injection solution inject 0.8ml sq once a week, every week N Verified   taking as directed Insulin Syringe 1 mL 28 gauge x 1/2\" use 1 syringe with MTX once a week. N Verified   taking as directed omeprazole 40 mg capsule,delayed release take 1 capsule by oral route  every day before a meal N Verified     Allergies:  Ingredient Reaction Medication Name Comment   LATEX Unknown     (Reviewed, updated.)  Review of Systems  System Neg/Pos Details   Constitutional Positive Night sweats. Constitutional Negative Fever and weight loss. ENMT Positive Vertigo. ENMT Negative Hearing loss, tinnitus and voice change. Eyes Negative Diplopia and vision loss. Respiratory Positive Cough, Dyspnea. Respiratory Negative Asthma, hemoptysis, known TB exposure and wheezing. Cardio Positive Irregular heartbeat/palpitations. Cardio Negative Chest pain, claudication, edema and thrombophlebitis. GI Positive Abdominal pain, Dysphagia, Reflux. GI Negative Bloating, hemorrhoids, jaundice, nausea and vomiting.  Negative Dysuria, nocturia, passage stone/gravel and urinary incontinence. Endocrine Negative Cold intolerance and goiter. Neuro Negative Focal weakness, headache, paresthesia, seizures and syncope. Integumentary Negative Change in shape/size of mole(s) and skin lesion. MS Positive Back pain, Bone/joint symptoms. MS Negative Muscle weakness. Hema/Lymph Negative Easy bleeding and easy bruising.    Allergic/Immuno Negative Contact allergy and contact dermatitis. Reproductive Positive The patient is post-menopausal.          Vital Signs     Gynecologic History  Patient is Postmenopausal.      Height  Time ft in cm Last Measured Height Position   8:47 AM 5.0 6.00 167.64 08/18/2017 0     Weight/BSA/BMI  Time lb oz kg Context BMI kg/m2 BSA m2   8:47 .00  93.440 dressed with shoes 33.25      Blood Pressure  Time BP mm/Hg Position Side Site Method Cuff Size   8:47 /92 sitting right arm automatic adult     Temperature/Pulse/Respiration  Time Temp F Temp C Temp Site Pulse/min Pattern Resp/ min   8:47 AM 98.70 37.06 ear 79 regular      Measured By  Time Measured by   8:47 AM Tiki Gonzalez       Physical Exam:  Exam Findings Details   Constitutional Normal No acute distress. Well nourished. Well developed. Eyes Normal General - Right: Normal, Left: Normal. Sclera - Right: Normal, Left: Normal.   Neck Exam Normal Inspection - Normal.   Respiratory Normal Cough - Absent. Effort - Normal.   Cardiovascular Normal Inspection - JVD: Absent. Heart rate - Regular rate. Rhythm - Regular. Abdomen * Abdominal tenderness - RUQ, mild. Abdomen Normal Patient is not obese. No hepatic enlargement. No spleen enlargement. No hernia. No ascites. No palpable mass. Skin * Inspection - General inspection: warm and dry, anicteric. Musculoskeletal Normal Visual overview of all four extremities is normal. Gait - Normal.   Extremity Normal No Cyanosis. No Edema. Neurological Normal Level of consciousness - Normal. Orientation - Normal. Memory - Normal. Hand dominance - Right-handed. Psychiatric Normal Orientation - Oriented to time, place, person & situation. No agitation. Not anxious. Appropriate mood and affect.          Medications (added, continued, or stopped this visit):  Started Medication Directions Instruction Stopped   12/07/2016 diclofenac sodium 75 mg tablet,delayed release take 1 tablet by oral route 2 times every day     05/22/2017 DULOXETINE HCL 60MG CPEP TAKE ONE CAPSULE BY MOUTH EVERY DAY     60/82/1908 folic acid 1 mg tablet take 1 tablet by oral route  every day     06/08/2017 Insulin Syringe 1 mL 28 gauge x 1/2\" use 1 syringe with MTX once a week.      05/22/2017 LYRICA 150MG CAPS TAKE ONE CAPSULE BY MOUTH TWICE A DAY     06/08/2017 methotrexate sodium 25 mg/mL injection solution inject 0.8ml sq once a week, every week  04/06/2018    metoprolol succinate ER 50 mg tablet,extended release 24 hr take 3 tablets by oral route every morning, and 3 tablets by oral route every evening     07/27/2017 omeprazole 40 mg capsule,delayed release take 1 capsule by oral route  every day before a meal     05/26/2017 ondansetron HCl 8 mg tablet take 1 tablet by oral route  every 8 hours as needed for nausea     06/06/2017 rizatriptan 10 mg tablet take 1 tablet by oral route once, may repeat at 2 hour intervals; do not exceed 30 mg in 24 hours     Completed by:        Mariya Connell 08/20/2017 11:39 AM   Document generated by:  Yrn Hahn 08/20/2017 11:39 AM     -----------------------------------------------------------------------------------------------------------

## 2017-08-24 ENCOUNTER — ANESTHESIA (OUTPATIENT)
Dept: SURGERY | Age: 47
End: 2017-08-24
Payer: COMMERCIAL

## 2017-08-24 ENCOUNTER — HOSPITAL ENCOUNTER (OUTPATIENT)
Age: 47
Setting detail: OUTPATIENT SURGERY
Discharge: HOME | End: 2017-08-24
Attending: SURGERY | Admitting: SURGERY
Payer: COMMERCIAL

## 2017-08-24 VITALS
HEIGHT: 65 IN | DIASTOLIC BLOOD PRESSURE: 56 MMHG | HEART RATE: 71 BPM | OXYGEN SATURATION: 96 % | TEMPERATURE: 97.7 F | WEIGHT: 207.06 LBS | BODY MASS INDEX: 34.5 KG/M2 | RESPIRATION RATE: 16 BRPM | SYSTOLIC BLOOD PRESSURE: 102 MMHG

## 2017-08-24 LAB — HCG SERPL QL: NEGATIVE

## 2017-08-24 PROCEDURE — 77030020782 HC GWN BAIR PAWS FLX 3M -B: Performed by: SURGERY

## 2017-08-24 PROCEDURE — 74011250636 HC RX REV CODE- 250/636: Performed by: SURGERY

## 2017-08-24 PROCEDURE — 77030002935 HC SUT MCRYL J&J -C: Performed by: SURGERY

## 2017-08-24 PROCEDURE — 77030008602 HC TRCR ENDOSC EPATH J&J -B: Performed by: SURGERY

## 2017-08-24 PROCEDURE — 74011000250 HC RX REV CODE- 250

## 2017-08-24 PROCEDURE — 77030031139 HC SUT VCRL2 J&J -A: Performed by: SURGERY

## 2017-08-24 PROCEDURE — 74011250636 HC RX REV CODE- 250/636

## 2017-08-24 PROCEDURE — 88304 TISSUE EXAM BY PATHOLOGIST: CPT | Performed by: SURGERY

## 2017-08-24 PROCEDURE — 77030008603 HC TRCR ENDOSC EPATH J&J -C: Performed by: SURGERY

## 2017-08-24 PROCEDURE — 77030008477 HC STYL SATN SLP COVD -A: Performed by: NURSE ANESTHETIST, CERTIFIED REGISTERED

## 2017-08-24 PROCEDURE — 77030009851 HC PCH RTVR ENDOSC AMR -B: Performed by: SURGERY

## 2017-08-24 PROCEDURE — 77030006643: Performed by: NURSE ANESTHETIST, CERTIFIED REGISTERED

## 2017-08-24 PROCEDURE — 77030008518 HC TBNG INSUF ENDO STRY -B: Performed by: SURGERY

## 2017-08-24 PROCEDURE — 77030020053 HC ELECTRD LAPSCP COVD -B: Performed by: SURGERY

## 2017-08-24 PROCEDURE — 74011250637 HC RX REV CODE- 250/637: Performed by: ANESTHESIOLOGY

## 2017-08-24 PROCEDURE — 77030003580 HC NDL INSUF VERES J&J -B: Performed by: SURGERY

## 2017-08-24 PROCEDURE — 77030010030: Performed by: SURGERY

## 2017-08-24 PROCEDURE — 77030020255 HC SOL INJ LR 1000ML BG: Performed by: SURGERY

## 2017-08-24 PROCEDURE — 74011000250 HC RX REV CODE- 250: Performed by: SURGERY

## 2017-08-24 PROCEDURE — 77030018836 HC SOL IRR NACL ICUM -A: Performed by: SURGERY

## 2017-08-24 PROCEDURE — 76060000032 HC ANESTHESIA 0.5 TO 1 HR: Performed by: SURGERY

## 2017-08-24 PROCEDURE — 76010000138 HC OR TIME 0.5 TO 1 HR: Performed by: SURGERY

## 2017-08-24 PROCEDURE — 36415 COLL VENOUS BLD VENIPUNCTURE: CPT | Performed by: SURGERY

## 2017-08-24 PROCEDURE — 76210000006 HC OR PH I REC 0.5 TO 1 HR: Performed by: SURGERY

## 2017-08-24 PROCEDURE — 77030008683 HC TU ET CUF COVD -A: Performed by: NURSE ANESTHETIST, CERTIFIED REGISTERED

## 2017-08-24 PROCEDURE — 77030010507 HC ADH SKN DERMBND J&J -B: Performed by: SURGERY

## 2017-08-24 PROCEDURE — 77030032490 HC SLV COMPR SCD KNE COVD -B: Performed by: SURGERY

## 2017-08-24 PROCEDURE — 76210000026 HC REC RM PH II 1 TO 1.5 HR: Performed by: SURGERY

## 2017-08-24 PROCEDURE — 77030018875 HC APPL CLP LIG4 J&J -B: Performed by: SURGERY

## 2017-08-24 PROCEDURE — 84703 CHORIONIC GONADOTROPIN ASSAY: CPT | Performed by: SURGERY

## 2017-08-24 RX ORDER — LIDOCAINE HYDROCHLORIDE 20 MG/ML
INJECTION, SOLUTION EPIDURAL; INFILTRATION; INTRACAUDAL; PERINEURAL AS NEEDED
Status: DISCONTINUED | OUTPATIENT
Start: 2017-08-24 | End: 2017-08-24 | Stop reason: HOSPADM

## 2017-08-24 RX ORDER — FENTANYL CITRATE 50 UG/ML
50 INJECTION, SOLUTION INTRAMUSCULAR; INTRAVENOUS
Status: DISCONTINUED | OUTPATIENT
Start: 2017-08-24 | End: 2017-08-24 | Stop reason: HOSPADM

## 2017-08-24 RX ORDER — OXYCODONE AND ACETAMINOPHEN 5; 325 MG/1; MG/1
1 TABLET ORAL
Qty: 30 TAB | Refills: 0 | Status: SHIPPED | OUTPATIENT
Start: 2017-08-24 | End: 2018-12-21

## 2017-08-24 RX ORDER — SODIUM CHLORIDE 0.9 % (FLUSH) 0.9 %
5-10 SYRINGE (ML) INJECTION AS NEEDED
Status: DISCONTINUED | OUTPATIENT
Start: 2017-08-24 | End: 2017-08-24 | Stop reason: HOSPADM

## 2017-08-24 RX ORDER — ROCURONIUM BROMIDE 10 MG/ML
INJECTION, SOLUTION INTRAVENOUS AS NEEDED
Status: DISCONTINUED | OUTPATIENT
Start: 2017-08-24 | End: 2017-08-24 | Stop reason: HOSPADM

## 2017-08-24 RX ORDER — INSULIN LISPRO 100 [IU]/ML
INJECTION, SOLUTION INTRAVENOUS; SUBCUTANEOUS ONCE
Status: DISCONTINUED | OUTPATIENT
Start: 2017-08-24 | End: 2017-08-24 | Stop reason: HOSPADM

## 2017-08-24 RX ORDER — NEOSTIGMINE METHYLSULFATE 5 MG/5 ML
SYRINGE (ML) INTRAVENOUS AS NEEDED
Status: DISCONTINUED | OUTPATIENT
Start: 2017-08-24 | End: 2017-08-24 | Stop reason: HOSPADM

## 2017-08-24 RX ORDER — SCOLOPAMINE TRANSDERMAL SYSTEM 1 MG/1
1.5 PATCH, EXTENDED RELEASE TRANSDERMAL ONCE
Status: DISCONTINUED | OUTPATIENT
Start: 2017-08-24 | End: 2017-08-24 | Stop reason: HOSPADM

## 2017-08-24 RX ORDER — DEXAMETHASONE SODIUM PHOSPHATE 4 MG/ML
INJECTION, SOLUTION INTRA-ARTICULAR; INTRALESIONAL; INTRAMUSCULAR; INTRAVENOUS; SOFT TISSUE AS NEEDED
Status: DISCONTINUED | OUTPATIENT
Start: 2017-08-24 | End: 2017-08-24 | Stop reason: HOSPADM

## 2017-08-24 RX ORDER — SODIUM CHLORIDE, SODIUM LACTATE, POTASSIUM CHLORIDE, CALCIUM CHLORIDE 600; 310; 30; 20 MG/100ML; MG/100ML; MG/100ML; MG/100ML
125 INJECTION, SOLUTION INTRAVENOUS CONTINUOUS
Status: DISCONTINUED | OUTPATIENT
Start: 2017-08-24 | End: 2017-08-24 | Stop reason: HOSPADM

## 2017-08-24 RX ORDER — SODIUM CHLORIDE, SODIUM LACTATE, POTASSIUM CHLORIDE, CALCIUM CHLORIDE 600; 310; 30; 20 MG/100ML; MG/100ML; MG/100ML; MG/100ML
1000 INJECTION, SOLUTION INTRAVENOUS CONTINUOUS
Status: DISCONTINUED | OUTPATIENT
Start: 2017-08-24 | End: 2017-08-24 | Stop reason: HOSPADM

## 2017-08-24 RX ORDER — NALOXONE HYDROCHLORIDE 0.4 MG/ML
0.1 INJECTION, SOLUTION INTRAMUSCULAR; INTRAVENOUS; SUBCUTANEOUS AS NEEDED
Status: DISCONTINUED | OUTPATIENT
Start: 2017-08-24 | End: 2017-08-24 | Stop reason: HOSPADM

## 2017-08-24 RX ORDER — MIDAZOLAM HYDROCHLORIDE 1 MG/ML
INJECTION, SOLUTION INTRAMUSCULAR; INTRAVENOUS AS NEEDED
Status: DISCONTINUED | OUTPATIENT
Start: 2017-08-24 | End: 2017-08-24 | Stop reason: HOSPADM

## 2017-08-24 RX ORDER — BUPIVACAINE HYDROCHLORIDE 5 MG/ML
INJECTION, SOLUTION EPIDURAL; INTRACAUDAL AS NEEDED
Status: DISCONTINUED | OUTPATIENT
Start: 2017-08-24 | End: 2017-08-24 | Stop reason: HOSPADM

## 2017-08-24 RX ORDER — DEXTROSE 50 % IN WATER (D50W) INTRAVENOUS SYRINGE
25-50 AS NEEDED
Status: DISCONTINUED | OUTPATIENT
Start: 2017-08-24 | End: 2017-08-24 | Stop reason: HOSPADM

## 2017-08-24 RX ORDER — HYDROMORPHONE HYDROCHLORIDE 2 MG/ML
INJECTION, SOLUTION INTRAMUSCULAR; INTRAVENOUS; SUBCUTANEOUS AS NEEDED
Status: DISCONTINUED | OUTPATIENT
Start: 2017-08-24 | End: 2017-08-24 | Stop reason: HOSPADM

## 2017-08-24 RX ORDER — ONDANSETRON 2 MG/ML
INJECTION INTRAMUSCULAR; INTRAVENOUS AS NEEDED
Status: DISCONTINUED | OUTPATIENT
Start: 2017-08-24 | End: 2017-08-24 | Stop reason: HOSPADM

## 2017-08-24 RX ORDER — GLYCOPYRROLATE 0.2 MG/ML
INJECTION INTRAMUSCULAR; INTRAVENOUS AS NEEDED
Status: DISCONTINUED | OUTPATIENT
Start: 2017-08-24 | End: 2017-08-24 | Stop reason: HOSPADM

## 2017-08-24 RX ORDER — FENTANYL CITRATE 50 UG/ML
INJECTION, SOLUTION INTRAMUSCULAR; INTRAVENOUS AS NEEDED
Status: DISCONTINUED | OUTPATIENT
Start: 2017-08-24 | End: 2017-08-24 | Stop reason: HOSPADM

## 2017-08-24 RX ORDER — MAGNESIUM SULFATE 100 %
4 CRYSTALS MISCELLANEOUS AS NEEDED
Status: DISCONTINUED | OUTPATIENT
Start: 2017-08-24 | End: 2017-08-24 | Stop reason: HOSPADM

## 2017-08-24 RX ORDER — EPHEDRINE SULFATE/0.9% NACL/PF 25 MG/5 ML
SYRINGE (ML) INTRAVENOUS AS NEEDED
Status: DISCONTINUED | OUTPATIENT
Start: 2017-08-24 | End: 2017-08-24 | Stop reason: HOSPADM

## 2017-08-24 RX ORDER — PROPOFOL 10 MG/ML
INJECTION, EMULSION INTRAVENOUS AS NEEDED
Status: DISCONTINUED | OUTPATIENT
Start: 2017-08-24 | End: 2017-08-24 | Stop reason: HOSPADM

## 2017-08-24 RX ORDER — FLUMAZENIL 0.1 MG/ML
0.2 INJECTION INTRAVENOUS
Status: DISCONTINUED | OUTPATIENT
Start: 2017-08-24 | End: 2017-08-24 | Stop reason: HOSPADM

## 2017-08-24 RX ADMIN — LIDOCAINE HYDROCHLORIDE 60 MG: 20 INJECTION, SOLUTION EPIDURAL; INFILTRATION; INTRACAUDAL; PERINEURAL at 08:23

## 2017-08-24 RX ADMIN — GLYCOPYRROLATE 0.2 MG: 0.2 INJECTION INTRAMUSCULAR; INTRAVENOUS at 08:34

## 2017-08-24 RX ADMIN — Medication 2 MG: at 08:51

## 2017-08-24 RX ADMIN — PROPOFOL 30 MG: 10 INJECTION, EMULSION INTRAVENOUS at 08:25

## 2017-08-24 RX ADMIN — PROPOFOL 150 MG: 10 INJECTION, EMULSION INTRAVENOUS at 08:23

## 2017-08-24 RX ADMIN — HYDROMORPHONE HYDROCHLORIDE 0.5 MG: 2 INJECTION, SOLUTION INTRAMUSCULAR; INTRAVENOUS; SUBCUTANEOUS at 08:32

## 2017-08-24 RX ADMIN — SODIUM CHLORIDE, SODIUM LACTATE, POTASSIUM CHLORIDE, AND CALCIUM CHLORIDE: 600; 310; 30; 20 INJECTION, SOLUTION INTRAVENOUS at 08:41

## 2017-08-24 RX ADMIN — ONDANSETRON 4 MG: 2 INJECTION INTRAMUSCULAR; INTRAVENOUS at 08:32

## 2017-08-24 RX ADMIN — MIDAZOLAM HYDROCHLORIDE 2 MG: 1 INJECTION, SOLUTION INTRAMUSCULAR; INTRAVENOUS at 08:19

## 2017-08-24 RX ADMIN — ROCURONIUM BROMIDE 25 MG: 10 INJECTION, SOLUTION INTRAVENOUS at 08:23

## 2017-08-24 RX ADMIN — GLYCOPYRROLATE 0.4 MG: 0.2 INJECTION INTRAMUSCULAR; INTRAVENOUS at 08:51

## 2017-08-24 RX ADMIN — Medication 10 MG: at 08:34

## 2017-08-24 RX ADMIN — DEXAMETHASONE SODIUM PHOSPHATE 4 MG: 4 INJECTION, SOLUTION INTRA-ARTICULAR; INTRALESIONAL; INTRAMUSCULAR; INTRAVENOUS; SOFT TISSUE at 08:32

## 2017-08-24 RX ADMIN — SODIUM CHLORIDE, SODIUM LACTATE, POTASSIUM CHLORIDE, AND CALCIUM CHLORIDE 125 ML/HR: 600; 310; 30; 20 INJECTION, SOLUTION INTRAVENOUS at 07:16

## 2017-08-24 RX ADMIN — FENTANYL CITRATE 100 MCG: 50 INJECTION, SOLUTION INTRAMUSCULAR; INTRAVENOUS at 08:22

## 2017-08-24 NOTE — DISCHARGE INSTRUCTIONS
DISCHARGE SUMMARY from Nurse    The following personal items are in your possession at time of discharge:    Dental Appliances: None  Visual Aid: Glasses, With patient     Home Medications: None  Jewelry: None  Clothing: Footwear, Pants, Shirt, Socks, Undergarments (locker 9)  Other Valuables: Eyeglasses, Cell Phone (given to spouse Sanam Spangler )             PATIENT INSTRUCTIONS:    After general anesthesia or intravenous sedation, for 24 hours or while taking prescription Narcotics:  · Limit your activities  · Do not drive and operate hazardous machinery  · Do not make important personal or business decisions  · Do  not drink alcoholic beverages  · If you have not urinated within 8 hours after discharge, please contact your surgeon on call. Report the following to your surgeon:  · Excessive pain, swelling, redness or odor of or around the surgical area  · Temperature over 100.5  · Nausea and vomiting lasting longer than 4 hours or if unable to take medications  · Any signs of decreased circulation or nerve impairment to extremity: change in color, persistent  numbness, tingling, coldness or increase pain  · Any questions        What to do at Home:  Light diet today advance as tolerated  Ok to shower on Friday do not scrub puncture sites  Ice to puncture sites if desired  Return to office in 1 week call for appt    If you experience any of the following symptoms heavy bleeding, fevers, severe pain, please follow up with dr Sulema Lindo      *  Please give a list of your current medications to your Primary Care Provider. *  Please update this list whenever your medications are discontinued, doses are      changed, or new medications (including over-the-counter products) are added. *  Please carry medication information at all times in case of emergency situations.           These are general instructions for a healthy lifestyle:    No smoking/ No tobacco products/ Avoid exposure to second hand smoke    Surgeon Tessy Thompson Warning:  Quitting smoking now greatly reduces serious risk to your health. Obesity, smoking, and sedentary lifestyle greatly increases your risk for illness    A healthy diet, regular physical exercise & weight monitoring are important for maintaining a healthy lifestyle    You may be retaining fluid if you have a history of heart failure or if you experience any of the following symptoms:  Weight gain of 3 pounds or more overnight or 5 pounds in a week, increased swelling in our hands or feet or shortness of breath while lying flat in bed. Please call your doctor as soon as you notice any of these symptoms; do not wait until your next office visit. Recognize signs and symptoms of STROKE:    F-face looks uneven    A-arms unable to move or move unevenly    S-speech slurred or non-existent    T-time-call 911 as soon as signs and symptoms begin-DO NOT go       Back to bed or wait to see if you get better-TIME IS BRAIN. Warning Signs of HEART ATTACK     Call 911 if you have these symptoms:   Chest discomfort. Most heart attacks involve discomfort in the center of the chest that lasts more than a few minutes, or that goes away and comes back. It can feel like uncomfortable pressure, squeezing, fullness, or pain.  Discomfort in other areas of the upper body. Symptoms can include pain or discomfort in one or both arms, the back, neck, jaw, or stomach.  Shortness of breath with or without chest discomfort.  Other signs may include breaking out in a cold sweat, nausea, or lightheadedness. Don't wait more than five minutes to call 911 - MINUTES MATTER! Fast action can save your life. Calling 911 is almost always the fastest way to get lifesaving treatment. Emergency Medical Services staff can begin treatment when they arrive -- up to an hour sooner than if someone gets to the hospital by car. The discharge information has been reviewed with the patient and caregiver.   The patient and caregiver verbalized understanding. Discharge medications reviewed with the patient and caregiver and appropriate educational materials and side effects teaching were provided.       Patient armband removed and shredded

## 2017-08-24 NOTE — IP AVS SNAPSHOT
[address] [place] [id number] Patient: Melvin Schreiber MRN: [id number] SBY:[birth year] You are allergic to the following Allergen Reactions Adhesive Tape-Silicones Other (comments)  
 blisters Tramadol Other (comments)  
 hallucinations Recent Documentation Height Weight BMI OB Status Smoking Status 1.651 m 93.9 kg 34.46 kg/m2 Unknown Never Smoker Emergency Contacts Name Discharge Info Relation Home Work Mobile [facility] DISCHARGE CAREGIVER [3] Spouse [3] [phone] About your hospitalization You were admitted on:  August 24, 2017 You last received care in the:  [facility] PHASE 2 RECOVERY You were discharged on:  August 24, 2017 Unit phone number:    
  
Why you were hospitalized Your primary diagnosis was:  Not on File Providers Seen During Your Hospitalizations Provider Role Specialty Primary office phone Johnie Meigs, MD Attending Provider General Surgery [phone] Your Primary Care Physician (PCP) Primary Care Physician Office Phone Office Fax Samreen ALMANZA [phone] Follow-up Information Follow up With Details Comments Contact Info Coral Rodriguez MD   [address] 
[phone] Current Discharge Medication List  
  
START taking these medications Dose & Instructions Dispensing Information Comments Morning Noon Evening Bedtime  
 oxyCODONE-acetaminophen 5-325 mg per tablet Commonly known as:  PERCOCET Your last dose was: Your next dose is:    
   
   
 Dose:  1 Tab Take 1 Tab by mouth every four (4) hours as needed for Pain. Max Daily Amount: 6 Tabs. Quantity:  30 Tab Refills:  0 CONTINUE these medications which have NOT CHANGED Dose & Instructions Dispensing Information Comments Morning Noon Evening Bedtime DAILY VALUE tablet Generic drug:  multivitamin Your last dose was: Your next dose is:    
   
   
 Dose:  1 Tab Take 1 Tab by mouth. Refills:  0 DULoxetine 30 mg capsule Commonly known as:  CYMBALTA Your last dose was: Your next dose is:    
   
   
 Dose:  60 mg Take 60 mg by mouth daily. Indications: FIBROMYALGIA Refills:  0  
     
   
   
   
  
 fluticasone 50 mcg/actuation nasal spray Commonly known as:  Antonino Carney Your last dose was: Your next dose is:    
   
   
 Dose:  2 Spray 2 Sprays by Both Nostrils route daily as needed for Rhinitis. Indications: ALLERGIC RHINITIS Refills:  0  
     
   
   
   
  
 folic acid 1 mg tablet Commonly known as:  [employer] Your last dose was: Your next dose is:    
   
   
 Dose:  1 mg Take 1 mg by mouth daily. Refills:  0 LO LOESTRIN FE 1 mg-10 mcg (24)/10 mcg (2) Tab Generic drug:  norethindrone-e.estradiol-iron Your last dose was: Your next dose is: Take  by mouth daily. Refills:  0 LYRICA 150 mg capsule Generic drug:  pregabalin Your last dose was: Your next dose is:    
   
   
 Dose:  1 Cap Take 1 Cap by mouth. Refills:  0  
     
   
   
   
  
 methotrexate (PF) 25 mg/mL injection Your last dose was: Your next dose is:    
   
   
 every Monday. Indications: PSORIATIC ARTHRITIS, 0.8 ml Refills:  0  
     
   
   
   
  
 omeprazole 40 mg capsule Commonly known as:  PRILOSEC Your last dose was: Your next dose is:    
   
   
 Dose:  40 mg Take 40 mg by mouth three (3) times daily (with meals). Refills:  0  
     
   
   
   
  
 ondansetron 8 mg disintegrating tablet Commonly known as:  ZOFRAN ODT Your last dose was: Your next dose is:    
   
   
 Dose:  8 mg Take 8 mg by mouth every eight (8) hours as needed for Nausea. Refills:  0  
     
   
   
   
  
 rizatriptan 10 mg tablet Commonly known as:  Anshu Chow Your last dose was: Your next dose is:    
   
   
 Dose:  10 mg Take 10 mg by mouth once as needed for Migraine. May repeat in 2 hours if needed   Indications: MIGRAINE Refills:  0  
     
   
   
   
  
 TOPROL XL 50 mg XL tablet Generic drug:  metoprolol succinate Your last dose was: Your next dose is:    
   
   
 Dose:  50 mg Take 50 mg by mouth. Refills:  0 Where to Get Your Medications Information on where to get these meds will be given to you by the nurse or doctor. ! Ask your nurse or doctor about these medications  
  oxyCODONE-acetaminophen 5-325 mg per tablet Discharge Instructions DISCHARGE SUMMARY from Nurse The following personal items are in your possession at time of discharge: 
 
Dental Appliances: None Visual Aid: Glasses, With patient Home Medications: None Jewelry: None Clothing: Footwear, Pants, Shirt, Socks, Undergarments (locker 9) Other Valuables: Eyeglasses, Cell Phone (given to spouse Mary Ellen Repress ) PATIENT INSTRUCTIONS: 
 
After general anesthesia or intravenous sedation, for 24 hours or while taking prescription Narcotics: · Limit your activities · Do not drive and operate hazardous machinery · Do not make important personal or business decisions · Do  not drink alcoholic beverages · If you have not urinated within 8 hours after discharge, please contact your surgeon on call. Report the following to your surgeon: 
· Excessive pain, swelling, redness or odor of or around the surgical area · Temperature over 100.5 · Nausea and vomiting lasting longer than 4 hours or if unable to take medications · Any signs of decreased circulation or nerve impairment to extremity: change in color, persistent  numbness, tingling, coldness or increase pain · Any questions What to do at Home: 
Light diet today advance as tolerated Ok to shower on Friday do not scrub puncture sites Ice to puncture sites if desired Return to office in 1 week call for appt If you experience any of the following symptoms heavy bleeding, fevers, severe pain, please follow up with dr Sae Patterson *  Please give a list of your current medications to your Primary Care Provider. *  Please update this list whenever your medications are discontinued, doses are 
    changed, or new medications (including over-the-counter products) are added. *  Please carry medication information at all times in case of emergency situations. These are general instructions for a healthy lifestyle: No smoking/ No tobacco products/ Avoid exposure to second hand smoke Surgeon General's Warning:  Quitting smoking now greatly reduces serious risk to your health. Obesity, smoking, and sedentary lifestyle greatly increases your risk for illness A healthy diet, regular physical exercise & weight monitoring are important for maintaining a healthy lifestyle You may be retaining fluid if you have a history of heart failure or if you experience any of the following symptoms:  Weight gain of 3 pounds or more overnight or 5 pounds in a week, increased swelling in our hands or feet or shortness of breath while lying flat in bed. Please call your doctor as soon as you notice any of these symptoms; do not wait until your next office visit. Recognize signs and symptoms of STROKE: 
 
F-face looks uneven A-arms unable to move or move unevenly S-speech slurred or non-existent T-time-call 911 as soon as signs and symptoms begin-DO NOT go Back to bed or wait to see if you get better-TIME IS BRAIN.  
 
Warning Signs of HEART ATTACK  
 
 Call 911 if you have these symptoms: 
? Chest discomfort. Most heart attacks involve discomfort in the center of the chest that lasts more than a few minutes, or that goes away and comes back. It can feel like uncomfortable pressure, squeezing, fullness, or pain. ? Discomfort in other areas of the upper body. Symptoms can include pain or discomfort in one or both arms, the back, neck, jaw, or stomach. ? Shortness of breath with or without chest discomfort. ? Other signs may include breaking out in a cold sweat, nausea, or lightheadedness. Don't wait more than five minutes to call [address]! Fast action can save your life. Calling 911 is almost always the fastest way to get lifesaving treatment. Emergency Medical Services staff can begin treatment when they arrive  up to an hour sooner than if someone gets to the hospital by car. The discharge information has been reviewed with the patient and caregiver. The patient and caregiver verbalized understanding. Discharge medications reviewed with the patient and caregiver and appropriate educational materials and side effects teaching were provided. Patient armband removed and shredded Discharge Orders None Introducing [facility]! Dear Zak Moran: Thank you for requesting a [employer] account. Our records indicate that you already have an active [employer] account. You can access your account anytime at [email or web address]. [employer] Did you know that you can access your hospital and ER discharge instructions at any time in [employer]? You can also review all of your test results from your hospital stay or ER visit. Additional Information If you have questions, please visit the Frequently Asked Questions section of the [employer] website at [email or web address] Remember, [employer] is NOT to be used for urgent needs. For medical emergencies, dial 911. Now available from your iPhone and Android! General Information Please provide this summary of care documentation to your next provider. Patient Signature:  ____________________________________________________________ Date:  ____________________________________________________________  
  
Jaylene Pane Provider Signature:  ____________________________________________________________ Date:  ____________________________________________________________

## 2017-08-24 NOTE — OP NOTES
[address] [place] [address]  OPERATIVE REPORT    Name:  Lonnie Joe  MR#:  [id number]  :  1970  Account #:  [de-identified]  Date of Adm:  2017  Date of Surgery:  2017      PREOPERATIVE DIAGNOSIS: Cholelithiasis and cholecystitis. POSTOPERATIVE DIAGNOSIS: Cholelithiasis and cholecystitis. PROCEDURES PERFORMED: Laparoscopic cholecystectomy. ATTENDING SURGEON: Patricia Wadsworth MD.    ANESTHESIA: General and local.    ESTIMATED BLOOD LOSS: 5 mL. SPECIMENS REMOVED: Gallbladder with contents. INDICATIONS FOR PROCEDURE: This is a 53-year-old female with  upper abdominal pain and gallstones on imaging, and she is brought to  the operating room for cholecystectomy. DESCRIPTION OF PROCEDURE: The patient was brought in the  operating room, placed on the table in the supine position. After  placing monitors and adequate general anesthesia, the abdomen was  prepped and draped in the usual sterile fashion. SCD hose were  placed preoperatively. A small horizontal incision was made superior to  the umbilicus through the skin down to subcutaneous tissue. A Veress  needle was placed in the abdomen and it was insufflated with carbon  dioxide to 15 mmHg pressure. A 5-mm port was placed at this location,  and the scope was placed into the peritoneal cavity. Two 5 mm ports  were placed in the right subcostal area and a 10 mm port was placed  in the subxiphoid area. The fundus of the gallbladder was grasped and  retracted over the liver in a cephalad direction. The peritoneum  overlying the body and infundibulum was dissected free. The cystic  artery was dissected out, clipped and divided. The cystic duct was  dissected out. It was clipped and divided. The common bile duct was  identified and it was uninflamed and normal size. The body of the  gallbladder was removed from the liver with electrocautery.  The  gallbladder was placed in an Endopouch and brought out the  subxiphoid port. The right upper quadrant was irrigated with saline. There was no active bleeding or evidence of a bile leak. The ports  were removed under direct visualization. There was no active bleeding. Fascial defect in the subxiphoid area was closed with interrupted 0  Vicryl suture. Marcaine was injected locally and 4-0 Monocryl was  used to reapproximate the skin. Steri-Strips were applied and the  wounds were dressed sterilely. The sponge, instrument, and needle  count was correct at the end of the procedure.         MD CYNDEE Juarez / KASEY  D:  08/24/2017   08:56  T:  08/24/2017   09:09  Job #:  [id number]

## 2017-08-24 NOTE — INTERVAL H&P NOTE
H&P Update:  Nikita Gilliam was seen and examined. History and physical has been reviewed. The patient has been examined. There have been no significant clinical changes since the completion of the originally dated History and Physical.  Patient identified by surgeon; surgical site was confirmed by patient and surgeon.     Signed By: Nash Lux MD     August 24, 2017 8:04 AM

## 2017-08-24 NOTE — ANESTHESIA PREPROCEDURE EVALUATION
Anesthetic History     PONV          Review of Systems / Medical History  Patient summary reviewed, nursing notes reviewed and pertinent labs reviewed    Pulmonary  Within defined limits                 Neuro/Psych   Within defined limits           Cardiovascular      Valvular problems/murmurs      Dysrhythmias : PVC      Exercise tolerance: >4 METS     GI/Hepatic/Renal  Within defined limits              Endo/Other  Within defined limits      Arthritis     Other Findings            Physical Exam    Airway  Mallampati: I  TM Distance: 4 - 6 cm  Neck ROM: normal range of motion   Mouth opening: Normal     Cardiovascular    Rhythm: regular  Rate: normal         Dental  No notable dental hx       Pulmonary  Breath sounds clear to auscultation               Abdominal  GI exam deferred       Other Findings            Anesthetic Plan    ASA: 2  Anesthesia type: general          Induction: Intravenous  Anesthetic plan and risks discussed with: Patient

## 2017-08-24 NOTE — PERIOP NOTES
TRANSFER - OUT REPORT:    Verbal report given to Tomi Barron RN (name) on JonesAnthon Lambert  being transferred to Phase 2(unit) for routine progression of care       Report consisted of patients Situation, Background, Assessment and   Recommendations(SBAR). Information from the following report(s) SBAR, Kardex, OR Summary, Procedure Summary, Intake/Output, MAR and Recent Results was reviewed with the receiving nurse. Lines:   Peripheral IV 08/24/17 Left Forearm (Active)   Site Assessment Clean, dry, & intact 8/24/2017  7:14 AM   Phlebitis Assessment 0 8/24/2017  7:14 AM   Infiltration Assessment 0 8/24/2017  7:14 AM   Dressing Status Clean, dry, & intact 8/24/2017  7:14 AM   Dressing Type Transparent 8/24/2017  7:14 AM   Hub Color/Line Status Pink 8/24/2017  7:14 AM        Opportunity for questions and clarification was provided.       Patient transported with:   O2 @ 2 liters  Tech

## 2017-08-24 NOTE — ANESTHESIA POSTPROCEDURE EVALUATION
Post-Anesthesia Evaluation & Assessment    Visit Vitals    /66    Pulse 66    Temp 36.7 °C (98 °F)    Resp 21    Ht 5' 5\" (1.651 m)    Wt 93.9 kg (207 lb 1 oz)    SpO2 96%    BMI 34.46 kg/m2       No untreated/active PONV    Post-operative hydration adequate. Adequate post-operative analgesia per PACU discharge criteria    Mental status & level of consciousness: alert and oriented x 3    Respiratory status: patent unassisted airway     No apparent anesthetic complications requiring additional post-anesthetic care    Patient has met all discharge requirements.             Earvin Carrel, MD

## 2018-12-20 ENCOUNTER — HOSPITAL ENCOUNTER (OUTPATIENT)
Dept: PREADMISSION TESTING | Age: 48
Discharge: HOME | End: 2018-12-20
Payer: COMMERCIAL

## 2018-12-20 LAB
ALBUMIN SERPL-MCNC: 3 G/DL (ref 3.4–5)
ALBUMIN/GLOB SERPL: 0.9 {RATIO} (ref 0.8–1.7)
ALP SERPL-CCNC: 75 U/L (ref 45–117)
ALT SERPL-CCNC: 21 U/L (ref 13–56)
ANION GAP SERPL CALC-SCNC: 4 MMOL/L (ref 3–18)
AST SERPL-CCNC: 12 U/L (ref 15–37)
ATRIAL RATE: 79 BPM
BILIRUB SERPL-MCNC: 0.2 MG/DL (ref 0.2–1)
BUN SERPL-MCNC: 22 MG/DL (ref 7–18)
BUN/CREAT SERPL: 31
CALCIUM SERPL-MCNC: 8.6 MG/DL (ref 8.5–10.1)
CALCULATED P AXIS, ECG09: 64 DEGREES
CALCULATED R AXIS, ECG10: 10 DEGREES
CALCULATED T AXIS, ECG11: 25 DEGREES
CHLORIDE SERPL-SCNC: 105 MMOL/L (ref 100–108)
CO2 SERPL-SCNC: 31 MMOL/L (ref 21–32)
CREAT SERPL-MCNC: 0.7 MG/DL (ref 0.6–1.3)
DIAGNOSIS, 93000: NORMAL
ERYTHROCYTE [DISTWIDTH] IN BLOOD BY AUTOMATED COUNT: 15.3 % (ref 11.6–14.5)
GLOBULIN SER CALC-MCNC: 3.3 G/DL (ref 2–4)
GLUCOSE SERPL-MCNC: 111 MG/DL (ref 74–99)
HCT VFR BLD AUTO: 40.2 % (ref 35–45)
HGB BLD-MCNC: 12.4 G/DL (ref 12–16)
MCH RBC QN AUTO: 30.4 PG (ref 24–34)
MCHC RBC AUTO-ENTMCNC: 30.8 G/DL (ref 31–37)
MCV RBC AUTO: 98.5 FL (ref 74–97)
P-R INTERVAL, ECG05: 156 MS
PLATELET # BLD AUTO: 270 K/UL (ref 135–420)
PMV BLD AUTO: 10.7 FL (ref 9.2–11.8)
POTASSIUM SERPL-SCNC: 3.8 MMOL/L (ref 3.5–5.5)
PROT SERPL-MCNC: 6.3 G/DL (ref 6.4–8.2)
Q-T INTERVAL, ECG07: 380 MS
QRS DURATION, ECG06: 84 MS
QTC CALCULATION (BEZET), ECG08: 435 MS
RBC # BLD AUTO: 4.08 M/UL (ref 4.2–5.3)
SODIUM SERPL-SCNC: 140 MMOL/L (ref 136–145)
VENTRICULAR RATE, ECG03: 79 BPM
WBC # BLD AUTO: 6.6 K/UL (ref 4.6–13.2)

## 2018-12-20 PROCEDURE — 80053 COMPREHEN METABOLIC PANEL: CPT

## 2018-12-20 PROCEDURE — 85027 COMPLETE CBC AUTOMATED: CPT

## 2018-12-20 PROCEDURE — 93005 ELECTROCARDIOGRAM TRACING: CPT

## 2018-12-20 PROCEDURE — 36415 COLL VENOUS BLD VENIPUNCTURE: CPT

## 2019-01-13 ENCOUNTER — ANESTHESIA EVENT (OUTPATIENT)
Dept: SURGERY | Age: 49
End: 2019-01-13
Payer: COMMERCIAL

## 2019-01-14 ENCOUNTER — HOSPITAL ENCOUNTER (OUTPATIENT)
Age: 49
Setting detail: OUTPATIENT SURGERY
Discharge: HOME | End: 2019-01-14
Attending: ORTHOPAEDIC SURGERY | Admitting: ORTHOPAEDIC SURGERY
Payer: COMMERCIAL

## 2019-01-14 ENCOUNTER — ANESTHESIA (OUTPATIENT)
Dept: SURGERY | Age: 49
End: 2019-01-14
Payer: COMMERCIAL

## 2019-01-14 VITALS
OXYGEN SATURATION: 97 % | HEIGHT: 66 IN | DIASTOLIC BLOOD PRESSURE: 68 MMHG | BODY MASS INDEX: 35.22 KG/M2 | RESPIRATION RATE: 16 BRPM | SYSTOLIC BLOOD PRESSURE: 114 MMHG | WEIGHT: 219.13 LBS | TEMPERATURE: 97 F | HEART RATE: 75 BPM

## 2019-01-14 DIAGNOSIS — M94.261 CHONDROMALACIA OF KNEE, RIGHT: Primary | Chronic | ICD-10-CM

## 2019-01-14 DIAGNOSIS — M23.203 OLD COMPLEX TEAR OF MEDIAL MENISCUS OF RIGHT KNEE: Chronic | ICD-10-CM

## 2019-01-14 LAB — HCG UR QL: NEGATIVE

## 2019-01-14 PROCEDURE — 77030002916 HC SUT ETHLN J&J -A: Performed by: ORTHOPAEDIC SURGERY

## 2019-01-14 PROCEDURE — 74011000250 HC RX REV CODE- 250: Performed by: ORTHOPAEDIC SURGERY

## 2019-01-14 PROCEDURE — 77030034478 HC TU IRR ARTHRO PT ARTH -B: Performed by: ORTHOPAEDIC SURGERY

## 2019-01-14 PROCEDURE — 76210000021 HC REC RM PH II 0.5 TO 1 HR: Performed by: ORTHOPAEDIC SURGERY

## 2019-01-14 PROCEDURE — 74011250636 HC RX REV CODE- 250/636

## 2019-01-14 PROCEDURE — 74011000250 HC RX REV CODE- 250

## 2019-01-14 PROCEDURE — 81025 URINE PREGNANCY TEST: CPT

## 2019-01-14 PROCEDURE — 74011250636 HC RX REV CODE- 250/636: Performed by: ANESTHESIOLOGY

## 2019-01-14 PROCEDURE — 77030012508 HC MSK AIRWY LMA AMBU -A: Performed by: ANESTHESIOLOGY

## 2019-01-14 PROCEDURE — 76010000138 HC OR TIME 0.5 TO 1 HR: Performed by: ORTHOPAEDIC SURGERY

## 2019-01-14 PROCEDURE — 77030020782 HC GWN BAIR PAWS FLX 3M -B: Performed by: ORTHOPAEDIC SURGERY

## 2019-01-14 PROCEDURE — 76060000032 HC ANESTHESIA 0.5 TO 1 HR: Performed by: ORTHOPAEDIC SURGERY

## 2019-01-14 PROCEDURE — 77030032489 HC SLV COMPR SCD FT CUF COVD -B: Performed by: ORTHOPAEDIC SURGERY

## 2019-01-14 PROCEDURE — 77030022877 HC TU IRR ARTHRO PMP ARTH -B: Performed by: ORTHOPAEDIC SURGERY

## 2019-01-14 PROCEDURE — 77030018835 HC SOL IRR LR ICUM -A: Performed by: ORTHOPAEDIC SURGERY

## 2019-01-14 PROCEDURE — 74011250636 HC RX REV CODE- 250/636: Performed by: ORTHOPAEDIC SURGERY

## 2019-01-14 PROCEDURE — 76210000006 HC OR PH I REC 0.5 TO 1 HR: Performed by: ORTHOPAEDIC SURGERY

## 2019-01-14 PROCEDURE — 77030032490 HC SLV COMPR SCD KNE COVD -B: Performed by: ORTHOPAEDIC SURGERY

## 2019-01-14 PROCEDURE — 77030006884 HC BLD SHV INCIS S&N -B: Performed by: ORTHOPAEDIC SURGERY

## 2019-01-14 PROCEDURE — 74011250637 HC RX REV CODE- 250/637: Performed by: ANESTHESIOLOGY

## 2019-01-14 RX ORDER — LIDOCAINE HYDROCHLORIDE 20 MG/ML
INJECTION, SOLUTION EPIDURAL; INFILTRATION; INTRACAUDAL; PERINEURAL AS NEEDED
Status: DISCONTINUED | OUTPATIENT
Start: 2019-01-14 | End: 2019-01-14 | Stop reason: HOSPADM

## 2019-01-14 RX ORDER — SODIUM CHLORIDE 0.9 % (FLUSH) 0.9 %
5-40 SYRINGE (ML) INJECTION AS NEEDED
Status: DISCONTINUED | OUTPATIENT
Start: 2019-01-14 | End: 2019-01-14 | Stop reason: HOSPADM

## 2019-01-14 RX ORDER — SODIUM CHLORIDE 0.9 % (FLUSH) 0.9 %
5-40 SYRINGE (ML) INJECTION EVERY 8 HOURS
Status: DISCONTINUED | OUTPATIENT
Start: 2019-01-14 | End: 2019-01-14 | Stop reason: HOSPADM

## 2019-01-14 RX ORDER — HYDROCODONE BITARTRATE AND ACETAMINOPHEN 5; 325 MG/1; MG/1
2 TABLET ORAL
Status: DISCONTINUED | OUTPATIENT
Start: 2019-01-14 | End: 2019-01-14 | Stop reason: HOSPADM

## 2019-01-14 RX ORDER — ONDANSETRON 2 MG/ML
INJECTION INTRAMUSCULAR; INTRAVENOUS AS NEEDED
Status: DISCONTINUED | OUTPATIENT
Start: 2019-01-14 | End: 2019-01-14 | Stop reason: HOSPADM

## 2019-01-14 RX ORDER — ACETAMINOPHEN 325 MG/1
650 TABLET ORAL
Status: DISCONTINUED | OUTPATIENT
Start: 2019-01-14 | End: 2019-01-14 | Stop reason: HOSPADM

## 2019-01-14 RX ORDER — SODIUM CHLORIDE, SODIUM LACTATE, POTASSIUM CHLORIDE, CALCIUM CHLORIDE 600; 310; 30; 20 MG/100ML; MG/100ML; MG/100ML; MG/100ML
125 INJECTION, SOLUTION INTRAVENOUS CONTINUOUS
Status: DISCONTINUED | OUTPATIENT
Start: 2019-01-14 | End: 2019-01-14 | Stop reason: HOSPADM

## 2019-01-14 RX ORDER — FENTANYL CITRATE 50 UG/ML
INJECTION, SOLUTION INTRAMUSCULAR; INTRAVENOUS AS NEEDED
Status: DISCONTINUED | OUTPATIENT
Start: 2019-01-14 | End: 2019-01-14 | Stop reason: HOSPADM

## 2019-01-14 RX ORDER — ONDANSETRON 2 MG/ML
4 INJECTION INTRAMUSCULAR; INTRAVENOUS
Status: DISCONTINUED | OUTPATIENT
Start: 2019-01-14 | End: 2019-01-14 | Stop reason: HOSPADM

## 2019-01-14 RX ORDER — SCOLOPAMINE TRANSDERMAL SYSTEM 1 MG/1
1 PATCH, EXTENDED RELEASE TRANSDERMAL ONCE
Status: DISCONTINUED | OUTPATIENT
Start: 2019-01-14 | End: 2019-01-14 | Stop reason: HOSPADM

## 2019-01-14 RX ORDER — MIDAZOLAM HYDROCHLORIDE 1 MG/ML
INJECTION, SOLUTION INTRAMUSCULAR; INTRAVENOUS AS NEEDED
Status: DISCONTINUED | OUTPATIENT
Start: 2019-01-14 | End: 2019-01-14 | Stop reason: HOSPADM

## 2019-01-14 RX ORDER — ACETAMINOPHEN 500 MG
1000 TABLET ORAL
Status: COMPLETED | OUTPATIENT
Start: 2019-01-14 | End: 2019-01-14

## 2019-01-14 RX ORDER — PROPOFOL 10 MG/ML
INJECTION, EMULSION INTRAVENOUS AS NEEDED
Status: DISCONTINUED | OUTPATIENT
Start: 2019-01-14 | End: 2019-01-14 | Stop reason: HOSPADM

## 2019-01-14 RX ORDER — HYDROMORPHONE HYDROCHLORIDE 2 MG/ML
0.5 INJECTION, SOLUTION INTRAMUSCULAR; INTRAVENOUS; SUBCUTANEOUS
Status: DISCONTINUED | OUTPATIENT
Start: 2019-01-14 | End: 2019-01-14 | Stop reason: HOSPADM

## 2019-01-14 RX ORDER — HYDROCODONE BITARTRATE AND ACETAMINOPHEN 5; 325 MG/1; MG/1
1 TABLET ORAL
Qty: 25 TAB | Refills: 0 | Status: SHIPPED | OUTPATIENT
Start: 2019-01-14 | End: 2021-03-18 | Stop reason: CLARIF

## 2019-01-14 RX ORDER — DEXAMETHASONE SODIUM PHOSPHATE 4 MG/ML
INJECTION, SOLUTION INTRA-ARTICULAR; INTRALESIONAL; INTRAMUSCULAR; INTRAVENOUS; SOFT TISSUE AS NEEDED
Status: DISCONTINUED | OUTPATIENT
Start: 2019-01-14 | End: 2019-01-14 | Stop reason: HOSPADM

## 2019-01-14 RX ORDER — CEFAZOLIN SODIUM 2 G/50ML
2 SOLUTION INTRAVENOUS ONCE
Status: COMPLETED | OUTPATIENT
Start: 2019-01-14 | End: 2019-01-14

## 2019-01-14 RX ORDER — HYDROMORPHONE HYDROCHLORIDE 1 MG/ML
1 INJECTION, SOLUTION INTRAMUSCULAR; INTRAVENOUS; SUBCUTANEOUS
Status: DISCONTINUED | OUTPATIENT
Start: 2019-01-14 | End: 2019-01-14 | Stop reason: HOSPADM

## 2019-01-14 RX ADMIN — FENTANYL CITRATE 50 MCG: 50 INJECTION, SOLUTION INTRAMUSCULAR; INTRAVENOUS at 11:15

## 2019-01-14 RX ADMIN — LIDOCAINE HYDROCHLORIDE 60 MG: 20 INJECTION, SOLUTION EPIDURAL; INFILTRATION; INTRACAUDAL; PERINEURAL at 11:03

## 2019-01-14 RX ADMIN — DEXAMETHASONE SODIUM PHOSPHATE 4 MG: 4 INJECTION, SOLUTION INTRA-ARTICULAR; INTRALESIONAL; INTRAMUSCULAR; INTRAVENOUS; SOFT TISSUE at 11:12

## 2019-01-14 RX ADMIN — FENTANYL CITRATE 50 MCG: 50 INJECTION, SOLUTION INTRAMUSCULAR; INTRAVENOUS at 11:09

## 2019-01-14 RX ADMIN — CEFAZOLIN SODIUM 2 G: 2 SOLUTION INTRAVENOUS at 11:11

## 2019-01-14 RX ADMIN — SODIUM CHLORIDE, SODIUM LACTATE, POTASSIUM CHLORIDE, AND CALCIUM CHLORIDE 125 ML/HR: 600; 310; 30; 20 INJECTION, SOLUTION INTRAVENOUS at 09:53

## 2019-01-14 RX ADMIN — PROPOFOL 150 MG: 10 INJECTION, EMULSION INTRAVENOUS at 11:03

## 2019-01-14 RX ADMIN — MIDAZOLAM HYDROCHLORIDE 2 MG: 1 INJECTION, SOLUTION INTRAMUSCULAR; INTRAVENOUS at 10:59

## 2019-01-14 RX ADMIN — ACETAMINOPHEN 1000 MG: 500 TABLET, FILM COATED ORAL at 09:50

## 2019-01-14 RX ADMIN — ONDANSETRON 4 MG: 2 INJECTION INTRAMUSCULAR; INTRAVENOUS at 11:12

## 2019-01-14 RX ADMIN — SODIUM CHLORIDE, SODIUM LACTATE, POTASSIUM CHLORIDE, AND CALCIUM CHLORIDE 125 ML/HR: 600; 310; 30; 20 INJECTION, SOLUTION INTRAVENOUS at 11:49

## 2019-01-14 NOTE — ANESTHESIA POSTPROCEDURE EVALUATION
Post-Anesthesia Evaluation and Assessment Cardiovascular Function/Vital Signs Visit Vitals /70 Pulse 83 Temp 36.2 °C (97.1 °F) Resp 16 Ht 5' 6\" (1.676 m) Wt 99.4 kg (219 lb 2 oz) SpO2 99% BMI 35.37 kg/m² Patient is status post Procedure(s): RIGHT KNEE ARTHROSCOPY WITH PARTIAL MEDIAL MENISCECTOMY AND CHONDROPLASTY. Nausea/Vomiting: Controlled. Postoperative hydration reviewed and adequate. Pain: 
Pain Scale 1: Numeric (0 - 10) (01/14/19 1155) Pain Intensity 1: 0 (01/14/19 1155) Managed. Neurological Status:  
Neuro (WDL): Exceptions to WDL (01/14/19 1155) At baseline. Mental Status and Level of Consciousness: Baseline and stable. Pulmonary Status:  
O2 Device: Nasal cannula (01/14/19 1155) Adequate oxygenation and airway patent. Complications related to anesthesia: None Post-anesthesia assessment completed. No concerns. Patient has met all discharge requirements.  
 
Signed By: Marlon Kirk MD

## 2019-01-14 NOTE — ANESTHESIA PREPROCEDURE EVALUATION
Anesthetic History PONV Review of Systems / Medical History Patient summary reviewed, nursing notes reviewed and pertinent labs reviewed Pulmonary Asthma Neuro/Psych Within defined limits Cardiovascular Dysrhythmias Exercise tolerance: >4 METS 
  
GI/Hepatic/Renal 
Within defined limits Endo/Other Arthritis Other Findings Physical Exam 
 
Airway Mallampati: III 
TM Distance: 4 - 6 cm Neck ROM: normal range of motion Mouth opening: Normal 
 
 Cardiovascular Regular rate and rhythm,  S1 and S2 normal,  no murmur, click, rub, or gallop Rhythm: regular Rate: normal 
 
 
 
 Dental 
No notable dental hx Pulmonary Breath sounds clear to auscultation Abdominal 
GI exam deferred Other Findings Anesthetic Plan ASA: 2 Anesthesia type: general 
 
 
 
 
Induction: Intravenous Anesthetic plan and risks discussed with: Patient and Spouse

## 2019-01-14 NOTE — PERIOP NOTES
Reviewed PTA medication list with patient/caregiver and patient/caregiver denies any additional medications. Patient admits to having a responsible adult care for them at home for at least 24 hours after surgery. Pt denies having an active cough and confirms being able to lie still in the supine position for at least 20 minutes. Patient denies trevor chewing/swallowing difficulties. Dual skin assessment & fall risk band verification completed with Anika Moctezuma RN.

## 2019-01-14 NOTE — PERIOP NOTES
TRANSFER - OUT REPORT:    Verbal report given to Boogie Liao (name) on Shama Hutchison  being transferred to Phase II (unit) for routine progression of care       Report consisted of patients Situation, Background, Assessment and   Recommendations(SBAR). Information from the following report(s) OR Summary, Procedure Summary, Intake/Output and MAR was reviewed with the receiving nurse. Lines:   Peripheral IV 01/14/19 Right Hand (Active)   Site Assessment Clean, dry, & intact 1/14/2019 11:41 AM   Phlebitis Assessment 0 1/14/2019 11:41 AM   Infiltration Assessment 0 1/14/2019 11:41 AM   Dressing Status Clean, dry, & intact 1/14/2019 11:41 AM   Dressing Type Transparent;Tape 1/14/2019 11:41 AM   Hub Color/Line Status Infusing 1/14/2019 11:41 AM   Alcohol Cap Used No 1/14/2019  9:52 AM        Opportunity for questions and clarification was provided.       Patient transported with:   O2 @ 2 liters  Registered Nurse

## 2019-01-14 NOTE — DISCHARGE INSTRUCTIONS
Patient Education     DISCHARGE SUMMARY from Nurse    PATIENT INSTRUCTIONS:    After general anesthesia or intravenous sedation, for 24 hours or while taking prescription Narcotics:  · Limit your activities  · Do not drive and operate hazardous machinery  · Do not make important personal or business decisions  · Do  not drink alcoholic beverages  · If you have not urinated within 8 hours after discharge, please contact your surgeon on call. Report the following to your surgeon:  · Excessive pain, swelling, redness or odor of or around the surgical area  · Temperature over 100.5  · Nausea and vomiting lasting longer than 4 hours or if unable to take medications  · Any signs of decreased circulation or nerve impairment to extremity: change in color, persistent  numbness, tingling, coldness or increase pain  · Any questions    What to do at Home:  [address] [place] [address] 48 HRS  AMBULATE WITH CRUTCHES WEIGHT BEAR AS TOLERATED RIGHT LEG  OK TO SHOWER TOMORROW  RETURN TO OFFICE IN 1 WEEK CALL FOR APPT      If you experience any of the following symptoms heavy bleeding, fevers, severe pain, circulation changes, please follow up with dr Rakesh Prieto    *  Please give a list of your current medications to your Primary Care Provider. *  Please update this list whenever your medications are discontinued, doses are      changed, or new medications (including over-the-counter products) are added. *  Please carry medication information at all times in case of emergency situations. These are general instructions for a healthy lifestyle:    No smoking/ No tobacco products/ Avoid exposure to second hand smoke  Surgeon General's Warning:  Quitting smoking now greatly reduces serious risk to your health.     Obesity, smoking, and sedentary lifestyle greatly increases your risk for illness    A healthy diet, regular physical exercise & weight monitoring are important for maintaining a healthy lifestyle    You may be retaining fluid if you have a history of heart failure or if you experience any of the following symptoms:  Weight gain of 3 pounds or more overnight or 5 pounds in a week, increased swelling in our hands or feet or shortness of breath while lying flat in bed. Please call your doctor as soon as you notice any of these symptoms; do not wait until your next office visit. Recognize signs and symptoms of STROKE:    F-face looks uneven    A-arms unable to move or move unevenly    S-speech slurred or non-existent    T-time-call 911 as soon as signs and symptoms begin-DO NOT go       Back to bed or wait to see if you get better-TIME IS BRAIN. Warning Signs of HEART ATTACK     Call 911 if you have these symptoms:   Chest discomfort. Most heart attacks involve discomfort in the center of the chest that lasts more than a few minutes, or that goes away and comes back. It can feel like uncomfortable pressure, squeezing, fullness, or pain.  Discomfort in other areas of the upper body. Symptoms can include pain or discomfort in one or both arms, the back, neck, jaw, or stomach.  Shortness of breath with or without chest discomfort.  Other signs may include breaking out in a cold sweat, nausea, or lightheadedness. Don't wait more than five minutes to call 911 - MINUTES MATTER! Fast action can save your life. Calling 911 is almost always the fastest way to get lifesaving treatment. Emergency Medical Services staff can begin treatment when they arrive -- up to an hour sooner than if someone gets to the hospital by car. The discharge information has been reviewed with the patient and caregiver. The patient and caregiver verbalized understanding.   Discharge medications reviewed with the patient and caregiver and appropriate educational materials and side effects teaching were provided. ___________________________________________________________________________________________________________________________________     Knee Arthroscopy: What to Expect at Home  Your Recovery    Arthroscopy is a way to find problems and do surgery inside a joint without making a large cut (incision). Your doctor put a lighted tube with a tiny camera--called an arthroscope, or scope--and surgical tools through small incisions in your knee. You will feel tired for several days. Your knee will be swollen, and you may notice that your skin is a different color near the cuts (incisions). The swelling is normal and will start to go away in a few days. Keeping your leg higher than your heart will help with swelling and pain. You will probably need about 6 weeks to recover. If your doctor repaired damaged tissue, recovery will take longer. You may have to limit your activity until your knee strength and movement return to normal. You may also be in a physical rehabilitation (rehab) program.  You may be able to return to a desk job or your normal routine in a few days. But if you do physical labor, it may be as long as 2 months before you can return to work. This care sheet gives you a general idea about how long it will take for you to recover. But each person recovers at a different pace. Follow the steps below to get better as quickly as possible. How can you care for yourself at home? Activity    · Rest when you feel tired. Getting enough sleep will help you recover. Use pillows to raise your ankle and leg above the level of your heart.     · Try to walk each day, after your doctor has said you can. Start by walking a little more than you did the day before. Bit by bit, increase the amount you walk. Walking boosts blood flow and helps prevent pneumonia and constipation.     · You may have a brace or crutches or both.     · Your doctor will tell you how often and how much you can move your leg and knee.   · If you have a desk job, you may be able to return to work a few days after the surgery. If you lift things or stand or walk a lot at work, it may be as long as 2 months before you can return.     · You can take a shower 48 to 72 hours after surgery and clean the incisions with regular soap and water. Do not take a bath or soak your knee until your doctor says it is okay.     · Ask your doctor when you can drive again.     · If you had a repair of torn tissue, follow your doctor's instructions for lifting things or moving your knee. Diet    · You can eat your normal diet. If your stomach is upset, try bland, low-fat foods like plain rice, broiled chicken, toast, and yogurt.     · Drink plenty of fluids, unless your doctor tells you not to.     · You may notice that your bowel movements are not regular right after your surgery. This is common. Try to avoid constipation and straining with bowel movements. You may want to take a fiber supplement every day. If you have not had a bowel movement after a couple of days, ask your doctor about taking a mild laxative. Medicines    · Your doctor will tell you if and when you can restart your medicines. He or she will also give you instructions about taking any new medicines.     · If you take blood thinners, such as warfarin (Coumadin), clopidogrel (Plavix), or aspirin, be sure to talk to your doctor. He or she will tell you if and when to start taking those medicines again. Make sure that you understand exactly what your doctor wants you to do.     · Be safe with medicines. Take pain medicines exactly as directed. ? If the doctor gave you a prescription medicine for pain, take it as prescribed. ? If you are not taking a prescription pain medicine, ask your doctor if you can take an over-the-counter medicine.     · If you think your pain medicine is making you sick to your stomach:  ? Take your medicine after meals (unless your doctor has told you not to). ?  Ask your doctor for a different pain medicine.     · If your doctor prescribed antibiotics, take them as directed. Do not stop taking them just because you feel better. You need to take the full course of antibiotics. Incision care    · If you have a dressing over your cuts (incisions), keep it clean and dry. You may remove it 48 to 72 hours after the surgery.     · If your incisions are open to the air, keep the area clean and dry.     · If you have strips of tape on the incisions, leave the tape on for a week or until it falls off. Exercise    · Move your toes and ankle as much as your bandages will allow.     · Bend and straighten your knee slowly several times during the day.     · Depending on why you had the surgery, you may have to do ankle and leg exercises. Your doctor or physical therapist will give you exercises as part of a rehabilitation program.     · Stop any activity that causes sharp pain. Talk to your doctor or physical therapist about what sports or other exercise you can do. Ice and elevation    · To reduce swelling and pain, put ice or a cold pack on your knee for 10 to 20 minutes at a time. Do this every 1 to 2 hours. Put a thin cloth between the ice and your skin. Follow-up care is a key part of your treatment and safety. Be sure to make and go to all appointments, and call your doctor if you are having problems. It's also a good idea to know your test results and keep a list of the medicines you take. When should you call for help? Call 911 anytime you think you may need emergency care.  For example, call if:    · You passed out (lost consciousness).     · You have severe trouble breathing.     · You have sudden chest pain and shortness of breath, or you cough up blood.    Call your doctor now or seek immediate medical care if:    · Your foot or toes are numb or tingling.     · Your foot is cool or pale, or it changes color.     · You have signs of a blood clot, such as:  ? Pain in your calf, back of the knee, thigh, or groin. ? Redness and swelling in your leg or groin.     · You are sick to your stomach or cannot keep fluids down.     · You have pain that does not get better after you take pain medicine.     · You have loose stitches, or your incision comes open.     · Bright red blood has soaked through the bandage over your incision.     · You have signs of infection, such as:  ? Increased pain, swelling, warmth, or redness. ? Red streaks leading from the incisions. ? Pus draining from the incisions. ? A fever.    Watch closely for any changes in your health, and be sure to contact your doctor if:    · You do not have a bowel movement after taking a laxative. Where can you learn more? Go to http://sandy-turner.info/. Enter B596 in the search box to learn more about \"Knee Arthroscopy: What to Expect at Home. \"  Current as of: November 29, 2017  Content Version: 11.8  © 6798-7219 [employer]. Care instructions adapted under license by [employer] (which disclaims liability or warranty for this information). If you have questions about a medical condition or this instruction, always ask your healthcare professional. [place] [address] any warranty or liability for your use of this information.        Patient armband removed and shredded

## 2019-01-14 NOTE — H&P
History and Physical        Patient: Thu Gibson               Sex: female          DOA: 2019         YOB: 1970      Age:  46 y.o.        LOS:  LOS: 0 days        HPI:     Thu Gibson is a 46 y.o. female who injured right knee hasn't responded to non-op therapy. MRI MMT, CM, baker's cyst    Past Medical History:   Diagnosis Date    Allergic rhinitis     Arthritis     osteoarthirtis    Asthma     Autoimmune disease ([address]     Psoriatic arthritis     Fibromyalgia     Ill-defined condition     Mitral valve prolapse    Migraine     MVP (mitral valve prolapse)     Nausea & vomiting     PVC (premature ventricular contraction)        Past Surgical History:   Procedure Laterality Date    HX BACK SURGERY      [id number] fusion    HX BREAST BIOPSY Bilateral     HX  SECTION   &     HX DILATION AND CURETTAGE      and endometrial ablation    HX LAP CHOLECYSTECTOMY      HX ORTHOPAEDIC Right     foot surgery    HX ORTHOPAEDIC Left 2017    bunionectomy       History reviewed. No pertinent family history. Social History     Socioeconomic History    Marital status:      Spouse name: Not on file    Number of children: Not on file    Years of education: Not on file    Highest education level: Not on file   Tobacco Use    Smoking status: Never Smoker    Smokeless tobacco: Never Used   Substance and Sexual Activity    Alcohol use: Yes     Comment: 1 glass of wine every 6 months    Drug use: Yes     Comment: Uses CBD oil    Sexual activity: Yes       Prior to Admission medications    Medication Sig Start Date End Date Taking? Authorizing Provider   albuterol (PROAIR HFA) 90 mcg/actuation inhaler Take 2 Puffs by inhalation every four (4) hours as needed for Wheezing. Yes Provider, Historical   metoprolol succinate (TOPROL XL PO) Take 150 mg by mouth two (2) times a day.    Yes Provider, Historical   sulfaSALAzine (AZULFIDINE) 500 mg tablet Take 1,000 mg by mouth two (2) times a day. After meals   Yes Provider, Historical   omeprazole (PRILOSEC) 20 mg capsule Take 20 mg by mouth nightly. Yes Provider, Historical   DULoxetine (CYMBALTA) 30 mg capsule Take 60 mg by mouth daily. Indications: FIBROMYALGIA 10/8/15  Yes Provider, Historical   pregabalin (LYRICA) 150 mg capsule Take 1 Cap by mouth two (2) times a day. 4/4/16  Yes Provider, Historical   multivitamin (DAILY VALUE) tablet Take 1 Tab by mouth daily. 3/24/14  Yes Provider, Historical   fluticasone (FLONASE) 50 mcg/actuation nasal spray 2 Sprays by Both Nostrils route daily as needed for Rhinitis. Indications: ALLERGIC RHINITIS   Yes Provider, Historical   folic acid (FOLVITE) 1 mg tablet Take 1 mg by mouth daily. Yes Provider, Historical   diclofenac EC (VOLTAREN) 75 mg EC tablet Take 75 mg by mouth two (2) times a day. Provider, Historical   methotrexate (RHEUMATREX) 2.5 mg tablet Take 12.5 mg by mouth. 2 times every Friday    Provider, Historical       Allergies   Allergen Reactions    Adhesive Tape-Silicones Other (comments)     blisters    Tramadol Other (comments)     hallucinations       Review of Systems  Pertinent items are noted in the History of Present Illness. Physical Exam:      Visit Vitals  /86 (BP 1 Location: Left arm, BP Patient Position: At rest;Pre-activity; Sitting)   Pulse 89   Temp 96.6 °F (35.9 °C)   Resp 18   Ht 5' 6\" (1.676 m)   Wt 99.4 kg (219 lb 2 oz)   SpO2 99%   BMI 35.37 kg/m²       Physical Exam:  Physical Exam:   General:  Alert, cooperative, no distress, appears stated age. Eyes:  Conjunctivae/corneas clear. PERRL, EOMs intact. Fundi benign   Ears:  Normal TMs and external ear canals both ears. Nose: Nares normal. Septum midline. Mucosa normal. No drainage or sinus tenderness.    Mouth/Throat: Lips, mucosa, and tongue normal. Teeth and gums normal.   Neck: Supple, symmetrical, trachea midline, no adenopathy, thyroid: no enlargement/tenderness/nodules, no carotid bruit and no JVD. Back:   Symmetric, no curvature. ROM normal. No CVA tenderness. Lungs:   Clear to auscultation bilaterally. Heart:  Regular rate and rhythm, S1, S2 normal, no murmur, click, rub or gallop. Abdomen:   Soft, non-tender. Bowel sounds normal. No masses,  No organomegaly. Extremities: Extremities normal, atraumatic, no cyanosis or edema. Right knee tender medial   Pulses: 2+ and symmetric all extremities. Skin: Skin color, texture, turgor normal. No rashes or lesions   Lymph nodes: Cervical, supraclavicular, and axillary nodes normal.   Neurologic: CNII-XII intact. Normal strength, sensation and reflexes throughout. Labs Reviewed: All lab results for the last 24 hours reviewed.     Assessment/Plan     Principal Problem:    Chondromalacia of knee, right (1/14/2019)    Active Problems:    Old complex tear of medial meniscus of right knee (1/14/2019)        SARK/PMM/chondroplasty

## 2019-01-14 NOTE — PERIOP NOTES
Called and spoke with  and informed him that the patient states she took her Diclofenac this morning. Per  this is fine.

## 2019-01-14 NOTE — OP NOTES
[address] REPORT    Zach Arredondo  MR#: [id number]  : 1970  ACCOUNT #: [de-identified]   DATE OF SERVICE: 2019    SURGEON:  Gladis Neumann MD     ANESTHESIOLOGIST:  Dr. Donny Chavez:  There were no assistants. ANESTHESIA:  General.    COMPLICATIONS:  No complications. ESTIMATED BLOOD LOSS:  No blood loss. SPECIMENS REMOVED:  No specimen. IMPLANTS:  No Implants. INDICATIONS:  A 49-year-old female who injured her right knee, goes to surgery because of continued pain. PREOPERATIVE DIAGNOSES:  Right knee medial meniscal tear, chondromalacia. POSTOPERATIVE DIAGNOSES:  Right knee medial meniscal tear, chondromalacia. PROCEDURES PERFORMED:  Right knee arthroscopic partial medial meniscectomy, chondroplasty of the medial femoral condyle and trochlea. DESCRIPTION:  The patient was brought to the operating room after receiving antibiotics. General anesthesia was administered. The right knee was prepped and draped sterilely. Lidocaine with epinephrine was placed into the knee. The arthroscope was placed laterally. A medial working portal was created for a shaver. There was focal mild early moderate chondromalacia on the medial femoral condyle and a moderate early severe focally on the tibial plateau. These were mechanically debrided. Probing the meniscus with a nerve hook showed a small tear of the posterior horn, resected with hand instruments and then contoured with a shaver. The ACL and PCL were normal.  Laterally, meniscus cartilage was normal.  Patella was normal; however, the trochlea had a narrow area of moderate to severe chondromalacia more distally in the trochlea that was mechanically debrided, removing delaminating cartilage and soft tissue. This completed the operation. The instruments were removed from the knee. Portal sites were closed with nylon suture. A Marcaine, morphine solution was placed into the knee.   A compressive wrap was applied and the patient went to Recovery in stable condition.       MD YENNI Cason / JOSE ARMANDO  D: 01/14/2019 11:35     T: 01/14/2019 12:25  JOB #: [id number]

## 2019-03-13 NOTE — PERIOP NOTES
Chg wipes givenDenies any prostheticsPatient states family physician is aware of upcoming procedure/surgeryDenies sleep apneaDenies family history of anesthesia complications Quality 226: Preventive Care And Screening: Tobacco Use: Screening And Cessation Intervention: Tobacco Screening not Performed for Medical Reasons Quality 431: Preventive Care And Screening: Unhealthy Alcohol Use - Screening: Patient screened for unhealthy alcohol use using a single question and scores less than 2 times per year Quality 130: Documentation Of Current Medications In The Medical Record: Current Medications Documented Detail Level: Generalized

## 2019-10-28 RX ORDER — SODIUM CHLORIDE 0.9 % (FLUSH) 0.9 %
5-40 SYRINGE (ML) INJECTION EVERY 8 HOURS
Status: CANCELLED | OUTPATIENT
Start: 2019-10-28

## 2019-10-28 RX ORDER — SODIUM CHLORIDE 0.9 % (FLUSH) 0.9 %
5-40 SYRINGE (ML) INJECTION AS NEEDED
Status: CANCELLED | OUTPATIENT
Start: 2019-10-28

## 2019-10-28 RX ORDER — DIPHENHYDRAMINE HYDROCHLORIDE 50 MG/ML
50 INJECTION, SOLUTION INTRAMUSCULAR; INTRAVENOUS ONCE
Status: CANCELLED | OUTPATIENT
Start: 2019-10-28 | End: 2019-10-28

## 2019-10-28 RX ORDER — ATROPINE SULFATE 0.1 MG/ML
0.5 INJECTION INTRAVENOUS
Status: CANCELLED | OUTPATIENT
Start: 2019-10-28 | End: 2019-10-29

## 2019-10-30 ENCOUNTER — HOSPITAL ENCOUNTER (OUTPATIENT)
Age: 49
Setting detail: OUTPATIENT SURGERY
Discharge: HOME | End: 2019-10-30
Attending: INTERNAL MEDICINE | Admitting: INTERNAL MEDICINE
Payer: COMMERCIAL

## 2019-10-30 VITALS
TEMPERATURE: 97.2 F | BODY MASS INDEX: 35.03 KG/M2 | OXYGEN SATURATION: 95 % | WEIGHT: 218 LBS | HEIGHT: 66 IN | SYSTOLIC BLOOD PRESSURE: 107 MMHG | DIASTOLIC BLOOD PRESSURE: 65 MMHG | HEART RATE: 90 BPM | RESPIRATION RATE: 16 BRPM

## 2019-10-30 LAB — HCG UR QL: NEGATIVE

## 2019-10-30 PROCEDURE — 77030020256 HC SOL INJ NACL 0.9%  500ML: Performed by: INTERNAL MEDICINE

## 2019-10-30 PROCEDURE — G0500 MOD SEDAT ENDO SERVICE >5YRS: HCPCS | Performed by: INTERNAL MEDICINE

## 2019-10-30 PROCEDURE — 77030040361 HC SLV COMPR DVT MDII -B: Performed by: INTERNAL MEDICINE

## 2019-10-30 PROCEDURE — 77030013991 HC SNR POLYP ENDOSC BSC -A: Performed by: INTERNAL MEDICINE

## 2019-10-30 PROCEDURE — 88305 TISSUE EXAM BY PATHOLOGIST: CPT

## 2019-10-30 PROCEDURE — 81025 URINE PREGNANCY TEST: CPT

## 2019-10-30 PROCEDURE — 99153 MOD SED SAME PHYS/QHP EA: CPT | Performed by: INTERNAL MEDICINE

## 2019-10-30 PROCEDURE — 74011250636 HC RX REV CODE- 250/636: Performed by: INTERNAL MEDICINE

## 2019-10-30 PROCEDURE — 76040000009: Performed by: INTERNAL MEDICINE

## 2019-10-30 RX ORDER — EPINEPHRINE 0.1 MG/ML
1 INJECTION INTRACARDIAC; INTRAVENOUS
Status: DISCONTINUED | OUTPATIENT
Start: 2019-10-30 | End: 2019-10-30 | Stop reason: HOSPADM

## 2019-10-30 RX ORDER — SODIUM CHLORIDE 9 MG/ML
1000 INJECTION, SOLUTION INTRAVENOUS CONTINUOUS
Status: DISPENSED | OUTPATIENT
Start: 2019-10-30 | End: 2019-10-30

## 2019-10-30 RX ORDER — FENTANYL CITRATE 50 UG/ML
100 INJECTION, SOLUTION INTRAMUSCULAR; INTRAVENOUS
Status: DISCONTINUED | OUTPATIENT
Start: 2019-10-30 | End: 2019-10-30 | Stop reason: HOSPADM

## 2019-10-30 RX ORDER — FLUMAZENIL 0.1 MG/ML
0.2 INJECTION INTRAVENOUS
Status: DISCONTINUED | OUTPATIENT
Start: 2019-10-30 | End: 2019-10-30 | Stop reason: HOSPADM

## 2019-10-30 RX ORDER — DEXTROMETHORPHAN/PSEUDOEPHED 2.5-7.5/.8
1.2 DROPS ORAL
Status: DISCONTINUED | OUTPATIENT
Start: 2019-10-30 | End: 2019-10-30 | Stop reason: HOSPADM

## 2019-10-30 RX ORDER — MIDAZOLAM HYDROCHLORIDE 1 MG/ML
.25-5 INJECTION, SOLUTION INTRAMUSCULAR; INTRAVENOUS
Status: DISCONTINUED | OUTPATIENT
Start: 2019-10-30 | End: 2019-10-30 | Stop reason: HOSPADM

## 2019-10-30 RX ORDER — NALOXONE HYDROCHLORIDE 0.4 MG/ML
0.4 INJECTION, SOLUTION INTRAMUSCULAR; INTRAVENOUS; SUBCUTANEOUS
Status: DISCONTINUED | OUTPATIENT
Start: 2019-10-30 | End: 2019-10-30 | Stop reason: HOSPADM

## 2019-10-30 RX ADMIN — SODIUM CHLORIDE 1000 ML: 900 INJECTION, SOLUTION INTRAVENOUS at 12:33

## 2019-10-30 RX ADMIN — SODIUM CHLORIDE 1000 ML: 900 INJECTION, SOLUTION INTRAVENOUS at 11:06

## 2019-10-30 NOTE — PROCEDURES
[facility]  Colonoscopy Procedure Report  _______________________________________________________  Patient: Aliza Samayoa                                         Attending Physician: Carmen Tan MD    Patient ID: [id number]                                      Referring Physician: Ayaka Roche MD    Exam Date: October 30, 2019 _______________________________________________________      Introduction: A  44 y.o. female patient, presents for outpatient Colonoscopy    Indications: Screen colon cancer, average risk and asymptomatic. Consent: The benefits, risks, and alternatives to the procedure were discussed and informed consent was obtained from the patient. Preparation: EKG, pulse, pulse oximetry and blood pressure were monitored throughout the procedure. ASA Classification: Class 2 - . The heart is an S1-S2 and regular heart rate and rhythm. Lungs are clear to auscultation and percussion. Abdomen is soft, nondistended, and nontender. Mental Status: awake, alert, and oriented to person, place, and time    Medications:  · Previously sedated she received Glucagon . 5 mg IV during the procedure. Rectal Exam: Normal Rectal Exam. No Blood. Pathology Specimens: Two specimens removed. Procedure: The colonoscope was passed with ease through the anus under direct visualization and advanced to the cecum. The patient required positioning on the back to aid in the passage of the scope. The scope was withdrawn and the mucosa was carefully examined. The quality of the preparation was excellent. The views were excellent. The patient's toleration of the procedure was excellent. Retroflexion was preformed in the ascending colon and hepatic flexure. The exam was done twice to the cecum. Total time is 14 minutes and withdrawal time is 9 minutes. Findings:    Rectum:   Medium sized internal hemorrhoids. Sigmoid:   Mild sigmoid diverticulosis.  10 mm triangular sessile polyp at the rectosigmoid junction hot snared. 2 sessile polyps one 8 mm and second 5 mm in the distal sigmoid colon all hot snared  Descending Colon:   7 mm sessile flattened polyp in the proximal descending colon hot snared  Transverse Colon:   Normal  Ascending Colon:   Normal  Cecum:   Normal  Terminal Ileum:   Normal      Unplanned Events: There were no unplanned events. Estimated Blood Loss: None  Impressions:    Medium sized internal hemorrhoids. 10 mm triangular sessile polyp at the rectosigmoid junction hot snared. 2 sessile polyps one 8 mm and second 5 mm in the distal sigmoid colon all hot snared. Mild sigmoid diverticulosis. 7 mm sessile flattened polyp in the proximal descending colon hot snared. Normal Mucosa. Complications: None; patient tolerated the procedure well. Recommendations:  · Discharge home when standard parameters are met. · Resume a high fiber diet. · Colonoscopy recommendation in 3 years.     Procedure Codes:    · Adebayo Wright [[id number]]    Endoscope Information:  Model Number(s)    [id number]   Assistant: None  Signed By: Mamta Fitzpatrick MD Date: October 30, 2019

## 2019-10-30 NOTE — PROCEDURES
[facility]  Esophagogastroduodenoscopy Procedure Report  _______________________________________________________  Patient: Adriana Gutierrez  Attending Physician: Gabbi Mccollum MD    Patient ID: [id number]                                     Referring Physician: Dr Sulema Pablo      Exam Date: 10/30/2019   _______________________________________________________      Introduction: A 44 y.o. Female, presents for an Esophagogastroduodenoscopy Procedure    Indication: Referred  by Dr. Nicole Mancera here for GERD. She has been taking Omeprazole 20mg daily for 2 years with significant relief with strict compliance. Pt reports burning in the throat, acid reflux and mild regurgitation otherwise. No SOB, chest pain, abd pain, voice changes, hoarseness, nausea, vomiting, or early satiety. : Gabbi Mccollum MD    Sedation:    Versed 15 mg IV, fentanyl 200 mcg IV Glucagon . 5 mg IV    Procedure Details:  After infomed consent was obtained for the procedure, with all risks and benefits of procedure explained the patient was taken to the endoscopy suite and placed in the left lateral decubitus position. Following sequential administration of sedation as per above, the endoscope was inserted into the mouth and advanced under direct vision to third portion of the duodenum. A careful inspection was made as the gastroscope was withdrawn, including a retroflexed view of the proximal stomach; findings and interventions are described below. ASA status: II      FINDINGS:  HYPOPHARYNX AND LARYNX: Normal  Esophagus: Normal proximal, middle and distal esophagus. 2 cm Hiatal hernia. Diaphragmatic pinch located at 39 cm. Stomach: Multiple large fundic types polyps in the gastric body the majority on the greater curvature side. size varied from 7 to 17 mm I removed most of them about 30 mostly because of their large sizes. Then most of the polyps were retrieved with [place] net in 4 trips to the outside.  There was a small bleeder that I was able to locate among the gastric folds. Then 2 metallic clips were applied to control the bleeding. No food or liquid retention. Normal, cardia, fundus, lesser curvature, incisura, antrum and pylorus. Mucosa is normal.    Duodenum/jejunum: The bulb, second, third portions and major papilla are normal     Therapies:  30 complete polypectomy were performed using hot cautery and the polyps were  retrieved  2 endoclip was placed for hemorrhage on one site that was bleeding post polypectomy  removal of foriegn body many resected polyps  using [place]yer net in about 4 trips to the outside    Specimen:   none           Complications:   None    EBL:  3 cc    IMPRESSION: 2 cm hiatal hernia. Multiple large fundic types polyps in the gastric body the majority on the greater curvature size 7 to 17 mm I removed most of them about 30 mostly because of their large sizes. Then most of the polyps were retrieved with [place] net in 4 trips to the outside. There was a small bleeder that I was able to locate among the gastric folds. Then 2 metallic clips were applied to control the bleeding. RECOMMENDATIONS: -Continue acid suppression with a proton pump inhibitor but increase the dose of Omeprazole to 20 mg twice daily . , -Await pathology. , -Follow symptoms. , Full  liquid diet today and advance as tolerated.  no Non-Steroidal Anti Inflammatorys (NSAIDS) , -Follow up with me., Recommend to repeat the EGD in 3 to 5 years or earlier if upper gi symptoms or progressive anemia    Assistant: none    Casie Lehman MD  10/30/2019  11:50 AM

## 2019-10-30 NOTE — H&P
This 44year old female presents for GERD and Colon Cancer Screening. History of Present Illness:  1. GERD   The onset of the heartburn was 2 years ago. The severity is mild. The problem is not changing. The patient reports heartburn. It occurs daily. The symptoms are aggravated by cold liquids, eating and soda. The symptoms are relieved by antacids and Prilosec. Associated symptoms include awakens w/ choking or heartburn, dysphagia, reflux and sore throat. Pertinent negatives include aspiration, chronic cough, dental erosions, dyspnea, globus sensation, halitosis, hoarseness, melena, nausea, pneumonitis, post-nasal drainage, stridor, vomiting, weight gain and weight loss. 2.  Colon Cancer Screening   No prior screening. Denies risk factors. Pertinent negatives include abdominal pain, change in bowel habits, change in stool caliber, constipation, decreased appetite, diarrhea, melena, nausea, rectal bleeding, vomiting, weight gain and weight loss. Additional information: No family history of colon cancer, No family history of Crohn's/colitis and No NSAID/ASA use. PROBLEM LIST:   Problem List reviewed.    Problem Description Onset Date Chronic Clinical Status Notes   Gastroesophageal reflux in child 2019 N     Cystitis 2012 Y     Dizziness and giddiness 2012 Y               PAST MEDICAL/SURGICAL HISTORY   (Detailed)    Disease/disorder Onset Date Management Date Comments   Gallbladder disease 2017        Spinal fusion, lumbar 2014 LB 2015 - L4-5/S1 Bulginf Disc      section       pebbles bunionectomy     Mitral valve prolapse w/ PVC       Osteoarthritis       Psoriasis       Headache, migraine       Fibromyalgia         GYNECOLOGIC HISTORY:  Patient is postmenopausal.       Family History  (Detailed)  Relationship Family Member Name  Age at Death Condition Onset Age Cause of Death   Daughter  N       Father    Rheumatoid arthritis N   Maternal grandfather  N  bone cancer  N   Mother  N  Asthma  N   Mother  N  Diabetes mellitus  N   Mother  Y  Hypertension  N   Paternal grandfather  N  Coronary artery disease  N         Social History:  (Detailed)  Tobacco use reviewed. The patient is right-handed. Preferred language is [place]. 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 CHILDREN  Has children:  3 daughter(s). [address] [place] [address] status is stable/permanent. The patient lives with  spouse and 3 daughter(s). Tobacco use status: Never smoked tobacco.  Smoking status: Never smoker. SMOKING STATUS  Use Status Type Smoking Status Usage Per Day Years Used Total Pack Years   no/never  Never smoker        TOBACCO/VAPING EXPOSURE  No passive smoke exposure. ALCOHOL  There is a history of alcohol use. Type: Wine. 1 glass consumed daily. CAFFEINE  The patient uses caffeine: diet coke occasionally and coffee. - 2 cups a day. HOME ENVIRONMENT/SAFETY  The home has smoke detectors. No carbon monoxide detector at home. There is not a pool/spa at home. The home does not have radon present. Uses seat belts.  EXPERIENCE  Patient has no  experience.             Medications (active prior to today)  Medication Instructions Start Date Stop Date Refilled Elsewhere   metoprolol succinate ER 50 mg tablet,extended release 24 hr take 3 tablets by oral route every morning, and 3 tablets by oral route every evening //   Y   PRILOSEC 40MG CPDR TAKE ONE CAPSULE BY MOUTH EVERY DAY BEFORE A MEAL 10/01/2018  10/01/2018 N   methotrexate sodium 2.5 mg tablet take 10 tablet by oral route  every week 05/14/2019 05/14/2019 N   diclofenac sodium 75 mg tablet,delayed release TAKE ONE TABLET BY MOUTH TWICE A DAY AS NEEDED FOR JOINT 05/14/2019 05/14/2019 N   sulfasalazine 500 mg tablet take 2 tablet by oral route 2 times every day after meals 05/14/2019 20/92/0685 N   folic acid 1 mg tablet TAKE ONE TABLET BY MOUTH EVERY DAY 05/14/2019 05/14/2019 N   duloxetine 60 mg capsule,delayed release take 1 capsule by oral route  every day 05/21/2019 05/21/2019 N   Lyrica 150 mg capsule take 1 capsule by oral route 2 times every day 05/21/2019 05/21/2019 N   ProAir HFA 90 mcg/actuation aerosol inhaler inhale 2 puff by inhalation route  every 4 - 6 hours as needed for cough or wheezing 06/19/2019 06/19/2019 N   Flovent  mcg/actuation aerosol inhaler inhale 2 puff by inhalation route 2 times every day 06/19/2019   N   omeprazole 20 mg capsule,delayed release take 1 capsule by oral route  every day before a meal 06/19/2019   N     Patient Status   Completed with information received for patient in a summary of care record. Medication Reconciliation  Medications reconciled today.   Medication Reviewed  Adherence Medication Name Sig Desc Elsewhere Status   taking as directed metoprolol succinate ER 50 mg tablet,extended release 24 hr take 3 tablets by oral route every morning, and 3 tablets by oral route every evening Y Verified   taking as directed PRILOSEC 40MG CPDR TAKE ONE CAPSULE BY MOUTH EVERY DAY BEFORE A MEAL N Verified   taking as directed methotrexate sodium 2.5 mg tablet take 10 tablet by oral route  every week N Verified   taking as directed diclofenac sodium 75 mg tablet,delayed release TAKE ONE TABLET BY MOUTH TWICE A DAY AS NEEDED FOR JOINT N Verified   taking as directed sulfasalazine 500 mg tablet take 2 tablet by oral route 2 times every day after meals N Verified   taking as directed folic acid 1 mg tablet TAKE ONE TABLET BY MOUTH EVERY DAY N Verified   taking as directed duloxetine 60 mg capsule,delayed release take 1 capsule by oral route  every day N Verified   taking as directed Lyrica 150 mg capsule take 1 capsule by oral route 2 times every day N Verified   taking as directed ProAir HFA 90 mcg/actuation aerosol inhaler inhale 2 puff by inhalation route  every 4 - 6 hours as needed for cough or wheezing N Verified   taking as directed Flovent  mcg/actuation aerosol inhaler inhale 2 puff by inhalation route 2 times every day N Verified   taking as directed omeprazole 20 mg capsule,delayed release take 1 capsule by oral route  every day before a meal N Verified     Medications (Added, Continued or Stopped today)  Start Date Medication Directions PRN Status PRN Reason Instruction Stop Date   05/14/2019 diclofenac sodium 75 mg tablet,delayed release TAKE ONE TABLET BY MOUTH TWICE A DAY AS NEEDED FOR JOINT N      05/21/2019 duloxetine 60 mg capsule,delayed release take 1 capsule by oral route  every day N      06/19/2019 Flovent  mcg/actuation aerosol inhaler inhale 2 puff by inhalation route 2 times every day N      13/25/6363 folic acid 1 mg tablet TAKE ONE TABLET BY MOUTH EVERY DAY N      05/21/2019 Lyrica 150 mg capsule take 1 capsule by oral route 2 times every day N      05/14/2019 methotrexate sodium 2.5 mg tablet take 10 tablet by oral route  every week N       metoprolol succinate ER 50 mg tablet,extended release 24 hr take 3 tablets by oral route every morning, and 3 tablets by oral route every evening N      06/19/2019 omeprazole 20 mg capsule,delayed release take 1 capsule by oral route  every day before a meal N      10/01/2018 PRILOSEC 40MG CPDR TAKE ONE CAPSULE BY MOUTH EVERY DAY BEFORE A MEAL N      06/19/2019 ProAir HFA 90 mcg/actuation aerosol inhaler inhale 2 puff by inhalation route  every 4 - 6 hours as needed for cough or wheezing N      05/14/2019 sulfasalazine 500 mg tablet take 2 tablet by oral route 2 times every day after meals N      07/17/2019 Suprep Bowel Prep Kit 17.5 gram-3.13 gram-1.6 gram oral solution take as prescribed by physician N   07/17/2019 07/17/2019 Suprep Bowel Prep Kit 17.5 gram-3.13 gram-1.6 gram oral solution take as prescribed by physician N 07/17/2019 07/17/2019 Suprep Bowel Prep Kit 17.5 gram-3.13 gram-1.6 gram oral solution take as prescribed by physician N        Allergies:  Ingredient Reaction (Severity) Medication Name Comment   LATEX Unknown     Reviewed, no changes. ORDERS:  Status Lab Order Time Frame Comments   ordered GASTROENTEROLOGY PROCEDURE within 1 Month at location [address] surgeon scheduled is Blank Pereira MD. An assistant has not been requested. suprep. Review of Systems  System Neg/Pos Details   Constitutional Negative Chills, Fever, Malaise, Weight gain and Weight loss. ENMT Positive Sore throat. ENMT Negative Dental erosions, Ear infections, Globus sensation, Halitosis, Hoarseness, Nasal congestion, Post-nasal drainage and Sinus Infection. Eyes Negative Double vision and Eye pain. Respiratory Negative Aspiration, Asthma, Chronic cough, Dyspnea, Pleuritic pain, Pneumonitis, Stridor and Wheezing. Cardio Negative Chest pain, Edema and Irregular heartbeat/palpitations. GI Positive Awakenings with choking or heartburn, Dysphagia, Reflux. GI Negative Abdominal pain, Change in bowel habits, Change in stool caliber, Constipation, Decreased appetite, Diarrhea, Heartburn, Hematemesis, Hematochezia, Melena, Nausea, Rectal bleeding and Vomiting.  Negative Dysuria, Hematuria, Urinary frequency, Urinary incontinence and Urinary retention. Endocrine Negative Cold intolerance, Heat intolerance and Increased thirst.   Neuro Negative Dizziness, Headache, Numbness, Tremors and Vertigo. Psych Negative Anxiety, Depression and Increased stress. Integumentary Negative Hives, Pruritus and Rash. MS Negative Back pain, Joint pain and Myalgia. Hema/Lymph Negative Easy bleeding, Easy bruising and Lymphadenopathy. Allergic/Immuno Negative Chemicals in work place, Contact allergy, Food allergies, Immunosuppression and Seasonal allergies.    Reproductive Positive The patient is post-menopausal. Reproductive Negative Breast lumps, Breast pain and Vaginal discharge. Vital Signs     Gynecologic History  Patient is postmenopausal.      Height  Time ft in cm Last Measured Height Position   11:36 AM 5.0 6.00 167.64 07/17/2019 Standing     Date/Time Temp Pulse BP MAP (Calculated) Arterial Line 1 BP (mmHg) BP Patient Position Resp SpO2 O2 Device O2 Flow Rate (L/min) Pre/Post Ductal Weight   10/30/19 1049 97.3 °F (36.3 °C) 100 127/87 100   16 98 % Room air            PHYSICAL EXAM:  Exam Findings Details   Constitutional Normal Well developed. Eyes Normal Conjunctiva - Right: Normal, Left: Normal. Sclera - Right: Normal, Left: Normal.   Nasopharynx Normal Lips/teeth/gums - Normal. Buccal mucosa - Normal.   Neck Exam Normal Inspection - Normal. Palpation - Normal. Thyroid gland - Normal.   Respiratory Normal Inspection - Normal. Auscultation - Normal.   Cardiovascular Normal Regular rate and rhythm. No murmurs, gallops, or rubs. Vascular Normal Pulses - Radial: Normal, Brachial: Normal, Dorsalis pedis: Normal, Posterior tibial: Normal.   Abdomen Normal Inspection - Normal. Appliance(s) - Normal. Abdominal muscles - Normal. Auscultation - Normal. Percussion - Normal. Anterior palpation - Normal, No guarding. Umbilicus - Normal. No abdominal tenderness. No hepatic enlargement. No spleen enlargement. No hernia. No Ascites. Shook's sign - Negative. No hepatic tenderness. No hepatic bruit. Skin Normal Inspection - Normal.   Musculoskeletal Normal Hands/Wrist - Right: Normal, Left: Normal.   Extremity Normal No edema. Neurological Normal Fine motor skills - Normal.   Psychiatric Normal Orientation - Oriented to time, place, person & situation. Appropriate mood and affect. Assessment/Plan  # Detail Type Description    1. Assessment GERD w/o esophagitis (K21.9). Patient Plan 44year old female  Referred  by Dr. Rebecca Ricketts here for GERD.  She has been taking Omeprazole 20mg daily for 2 years with significant relief with strict compliance. Pt reports burning in the throat, acid reflux and mild regurgitation otherwise. No SOB, chest pain, abd pain, voice changes, hoarseness, nausea, vomiting, or early satiety. PLAN: EGD scheduled. Avoid NSAID's, tobacco and ETOH consumption. I explained the procedure of upper endoscopy or EGD, the alternative and the risks involved which include but not limited to aspiration, bleeding perforation or reaction to sedation. He was agreeable to this. Plan Orders She will be scheduled for GASTROENTEROLOGY PROCEDURE, Next Lab Date is within 1 Month on 10/30/2019.         2. Assessment Encounter for screening colonoscopy (Z12.11). Patient Plan 44year old female  patient of Dr. Corbin Chapin  for colonoscopy. BM twice daily. Normal color, soft, formed in consistency. No evidence of blood or mucus, changes in bowel pattern or constipation issues. Patient reports no allergies or herbal consumption. Medical hx includes HTN, asthma, GERD, OA fibromyalgia, psoriatic arthritis. No significant cardiac, , or endocrine issues. Surgical hx remarkable for cholecystectomy,  X2, L4-5 fusion. No family history of colorectal CA. Denies tobacco and ETOH use. No significant weight gains or losses in the last 3-6 months. No heat or cold intolerances. Patient states  no N/V/D, fever, chills, sick contacts, SOB, abdominal or chest pains. No dysphagia, appetite is good which consists of 3 meals per day. PLAN: Colonoscopy Scheduled      She has average risk for colon cancer and is asymptomatic. She would be having her screening colonoscopy. I explained to her the procedure of colonoscopy and the risks involved which include but not limited to reaction to sedation, bleeding, perforation, infection or missing a lesion if bowels are not well prepped or are unusually tortuous. She agreed to proceed with the procedure and answered her questions.  I gave her the Suprep to clean her bowels. I advised her to take if needed extra laxatives for few days before in case she is on the constipated side to assure adequate bowel prep. Told her not take her medications in the morning of the procedure because they would be flushed out with the prep but can take them more confidently after the procedure. I advised her to bring all her medication with her.      No change in H&P

## 2019-10-30 NOTE — DISCHARGE INSTRUCTIONS
Breanne Franklin  [id number]  1970    COLON / EGD DISCHARGE INSTRUCTIONS    Discomfort:  Sore throat- throat lozenges or warm salt water gargle  Redness at IV site- apply warm compress to area; if redness or soreness persist- contact your physician  There may be a slight amount of blood passed from the rectum  Gaseous discomfort- walking, belching will help relieve any discomfort  You may not operate a vehicle until next day  You may not engage in an occupation involving machinery or appliances until next day  You may not drink alcoholic beverages until next day  Avoid making any critical decisions for at least 24 hour    DIET:   High fiber and anti reflux diet. ACTIVITY:  You may not  resume your normal daily activities until the next day, it is recommended that you spend the remainder of the day resting -  avoid any strenuous activity. CALL M.D.  IF ANY SIGN OF:   Increasing pain, nausea, vomiting  Abdominal distension (swelling)  New increased bleeding (oral or rectal)  Fever (chills)  Pain in chest area  Bloody discharge from nose or mouth  Shortness of breath     You may not  take any Advil, Aspirin, Ibuprofen, Motrin, Aleve, or Goodys for 21 days, ONLY  Tylenol as needed for pain. Follow-up Instructions: Follow-up in the office as scheduled or make a follow-up appointment in 2 weeks. Gwendolyn Gustafson MD  October 30, 2019       DISCHARGE SUMMARY from Nurse    PATIENT INSTRUCTIONS:    After general anesthesia or intravenous sedation, for 24 hours or while taking prescription Narcotics:  · Limit your activities  · Do not drive and operate hazardous machinery  · Do not make important personal or business decisions  · Do  not drink alcoholic beverages  · If you have not urinated within 8 hours after discharge, please contact your surgeon on call.     Report the following to your surgeon:  · Excessive pain, swelling, redness or odor of or around the surgical area  · Temperature over 100.5  · Nausea and vomiting lasting longer than 4 hours or if unable to take medications  · Any signs of decreased circulation or nerve impairment to extremity: change in color, persistent  numbness, tingling, coldness or increase pain  · Any questions    What to do at Home:  Recommended activity: as above,     If you experience any of the following symptoms as above, please follow up with Dr. Jose Angel Kincaid. *  Please give a list of your current medications to your Primary Care Provider. *  Please update this list whenever your medications are discontinued, doses are      changed, or new medications (including over-the-counter products) are added. *  Please carry medication information at all times in case of emergency situations. These are general instructions for a healthy lifestyle:    No smoking/ No tobacco products/ Avoid exposure to second hand smoke  Surgeon General's Warning:  Quitting smoking now greatly reduces serious risk to your health. Obesity, smoking, and sedentary lifestyle greatly increases your risk for illness    A healthy diet, regular physical exercise & weight monitoring are important for maintaining a healthy lifestyle    You may be retaining fluid if you have a history of heart failure or if you experience any of the following symptoms:  Weight gain of 3 pounds or more overnight or 5 pounds in a week, increased swelling in our hands or feet or shortness of breath while lying flat in bed. Please call your doctor as soon as you notice any of these symptoms; do not wait until your next office visit. The discharge information has been reviewed with the patient. The patient verbalized understanding. Discharge medications reviewed with the patient and appropriate educational materials and side effects teaching were provided.   ___________________________________________________________________________________________________________________________________  Patient armband removed and shredded

## 2021-03-08 ENCOUNTER — HOSPITAL ENCOUNTER (OUTPATIENT)
Dept: GENERAL RADIOLOGY | Age: 51
Discharge: HOME | End: 2021-03-08
Payer: COMMERCIAL

## 2021-03-08 ENCOUNTER — HOSPITAL ENCOUNTER (OUTPATIENT)
Dept: PREADMISSION TESTING | Age: 51
Discharge: HOME | End: 2021-03-08
Payer: COMMERCIAL

## 2021-03-08 ENCOUNTER — TRANSCRIBE ORDER (OUTPATIENT)
Dept: REGISTRATION | Age: 51
End: 2021-03-08

## 2021-03-08 DIAGNOSIS — Z01.818 OTHER SPECIFIED PRE-OPERATIVE EXAMINATION: ICD-10-CM

## 2021-03-08 DIAGNOSIS — Z01.818 OTHER SPECIFIED PRE-OPERATIVE EXAMINATION: Primary | ICD-10-CM

## 2021-03-08 LAB
ALBUMIN SERPL-MCNC: 3.6 G/DL (ref 3.4–5)
ALBUMIN/GLOB SERPL: 1 {RATIO} (ref 0.8–1.7)
ALP SERPL-CCNC: 100 U/L (ref 45–117)
ALT SERPL-CCNC: 39 U/L (ref 13–56)
ANION GAP SERPL CALC-SCNC: 11 MMOL/L (ref 3–18)
APPEARANCE UR: CLEAR
APTT PPP: 27.6 SEC (ref 23–36.4)
AST SERPL-CCNC: 22 U/L (ref 10–38)
ATRIAL RATE: 93 BPM
BASOPHILS # BLD: 0 K/UL (ref 0–0.1)
BASOPHILS NFR BLD: 0 % (ref 0–2)
BILIRUB SERPL-MCNC: 0.2 MG/DL (ref 0.2–1)
BILIRUB UR QL: NEGATIVE
BUN SERPL-MCNC: 22 MG/DL (ref 7–18)
BUN/CREAT SERPL: 31 (ref 12–20)
CALCIUM SERPL-MCNC: 8.9 MG/DL (ref 8.5–10.1)
CALCULATED P AXIS, ECG09: 70 DEGREES
CALCULATED R AXIS, ECG10: 31 DEGREES
CALCULATED T AXIS, ECG11: 46 DEGREES
CHLORIDE SERPL-SCNC: 116 MMOL/L (ref 100–111)
CO2 SERPL-SCNC: 29 MMOL/L (ref 21–32)
COLOR UR: ABNORMAL
CREAT SERPL-MCNC: 0.7 MG/DL (ref 0.6–1.3)
DIAGNOSIS, 93000: NORMAL
DIFFERENTIAL METHOD BLD: ABNORMAL
EOSINOPHIL # BLD: 0.2 K/UL (ref 0–0.4)
EOSINOPHIL NFR BLD: 3 % (ref 0–5)
ERYTHROCYTE [DISTWIDTH] IN BLOOD BY AUTOMATED COUNT: 14.5 % (ref 11.6–14.5)
EST. AVERAGE GLUCOSE BLD GHB EST-MCNC: 120 MG/DL
GLOBULIN SER CALC-MCNC: 3.5 G/DL (ref 2–4)
GLUCOSE SERPL-MCNC: 123 MG/DL (ref 74–99)
GLUCOSE UR STRIP.AUTO-MCNC: NEGATIVE MG/DL
HBA1C MFR BLD: 5.8 % (ref 4.2–5.6)
HCT VFR BLD AUTO: 41.2 % (ref 35–45)
HGB BLD-MCNC: 12.7 G/DL (ref 12–16)
HGB UR QL STRIP: NEGATIVE
INR PPP: 1 (ref 0.8–1.2)
KETONES UR QL STRIP.AUTO: ABNORMAL MG/DL
LEUKOCYTE ESTERASE UR QL STRIP.AUTO: NEGATIVE
LYMPHOCYTES # BLD: 1.8 K/UL (ref 0.9–3.6)
LYMPHOCYTES NFR BLD: 32 % (ref 21–52)
MCH RBC QN AUTO: 30.6 PG (ref 24–34)
MCHC RBC AUTO-ENTMCNC: 30.8 G/DL (ref 31–37)
MCV RBC AUTO: 99.3 FL (ref 74–97)
MONOCYTES # BLD: 0.5 K/UL (ref 0.05–1.2)
MONOCYTES NFR BLD: 9 % (ref 3–10)
NEUTS SEG # BLD: 3.1 K/UL (ref 1.8–8)
NEUTS SEG NFR BLD: 56 % (ref 40–73)
NITRITE UR QL STRIP.AUTO: NEGATIVE
P-R INTERVAL, ECG05: 166 MS
PH UR STRIP: 5 [PH] (ref 5–8)
PLATELET # BLD AUTO: 250 K/UL (ref 135–420)
PMV BLD AUTO: 10.9 FL (ref 9.2–11.8)
POTASSIUM SERPL-SCNC: 4.3 MMOL/L (ref 3.5–5.5)
PROT SERPL-MCNC: 7.1 G/DL (ref 6.4–8.2)
PROT UR STRIP-MCNC: NEGATIVE MG/DL
PROTHROMBIN TIME: 12.6 SEC (ref 11.5–15.2)
Q-T INTERVAL, ECG07: 360 MS
QRS DURATION, ECG06: 82 MS
QTC CALCULATION (BEZET), ECG08: 447 MS
RBC # BLD AUTO: 4.15 M/UL (ref 4.2–5.3)
SODIUM SERPL-SCNC: 156 MMOL/L (ref 136–145)
SP GR UR REFRACTOMETRY: >1.03 (ref 1–1.03)
UROBILINOGEN UR QL STRIP.AUTO: 1 EU/DL (ref 0.2–1)
VENTRICULAR RATE, ECG03: 93 BPM
WBC # BLD AUTO: 5.6 K/UL (ref 4.6–13.2)

## 2021-03-08 PROCEDURE — 81003 URINALYSIS AUTO W/O SCOPE: CPT

## 2021-03-08 PROCEDURE — 93005 ELECTROCARDIOGRAM TRACING: CPT

## 2021-03-08 PROCEDURE — 80053 COMPREHEN METABOLIC PANEL: CPT

## 2021-03-08 PROCEDURE — 85025 COMPLETE CBC W/AUTO DIFF WBC: CPT

## 2021-03-08 PROCEDURE — 85730 THROMBOPLASTIN TIME PARTIAL: CPT

## 2021-03-08 PROCEDURE — 85610 PROTHROMBIN TIME: CPT

## 2021-03-08 PROCEDURE — 36415 COLL VENOUS BLD VENIPUNCTURE: CPT

## 2021-03-08 PROCEDURE — 83036 HEMOGLOBIN GLYCOSYLATED A1C: CPT

## 2021-03-08 PROCEDURE — 71045 X-RAY EXAM CHEST 1 VIEW: CPT

## 2021-03-10 LAB
BACTERIA SPEC CULT: NORMAL
BACTERIA SPEC CULT: NORMAL
SERVICE CMNT-IMP: NORMAL

## 2021-03-16 NOTE — NURSE NAVIGATOR
Margarita Gifford watched the joint seminar online and received a preoperative education booklet in anticipation of joint replacement surgery.      Orthopaedic Nurse Navigator

## 2021-03-25 ENCOUNTER — HOSPITAL ENCOUNTER (OUTPATIENT)
Dept: PREADMISSION TESTING | Age: 51
Discharge: HOME | End: 2021-03-25
Payer: COMMERCIAL

## 2021-03-25 PROCEDURE — U0003 INFECTIOUS AGENT DETECTION BY NUCLEIC ACID (DNA OR RNA); SEVERE ACUTE RESPIRATORY SYNDROME CORONAVIRUS 2 (SARS-COV-2) (CORONAVIRUS DISEASE [COVID-19]), AMPLIFIED PROBE TECHNIQUE, MAKING USE OF HIGH THROUGHPUT TECHNOLOGIES AS DESCRIBED BY CMS-2020-01-R: HCPCS

## 2021-03-27 LAB — SARS-COV-2, NAA: NOT DETECTED

## 2021-03-31 ENCOUNTER — ANESTHESIA (OUTPATIENT)
Dept: SURGERY | Age: 51
End: 2021-03-31
Payer: COMMERCIAL

## 2021-03-31 ENCOUNTER — HOSPITAL ENCOUNTER (OUTPATIENT)
Age: 51
Setting detail: OBSERVATION
Discharge: HOME HEALTH | End: 2021-04-01
Attending: ORTHOPAEDIC SURGERY | Admitting: ORTHOPAEDIC SURGERY
Payer: COMMERCIAL

## 2021-03-31 ENCOUNTER — APPOINTMENT (OUTPATIENT)
Dept: GENERAL RADIOLOGY | Age: 51
End: 2021-03-31
Attending: PHYSICIAN ASSISTANT
Payer: COMMERCIAL

## 2021-03-31 ENCOUNTER — ANESTHESIA EVENT (OUTPATIENT)
Dept: SURGERY | Age: 51
End: 2021-03-31
Payer: COMMERCIAL

## 2021-03-31 DIAGNOSIS — Z96.651 HISTORY OF TOTAL KNEE ARTHROPLASTY, RIGHT: Primary | ICD-10-CM

## 2021-03-31 LAB
ABO + RH BLD: NORMAL
ANION GAP SERPL CALC-SCNC: 1 MMOL/L (ref 3–18)
ANION GAP SERPL CALC-SCNC: 5 MMOL/L (ref 3–18)
BASOPHILS # BLD: 0 K/UL (ref 0–0.1)
BASOPHILS NFR BLD: 0 % (ref 0–2)
BLOOD GROUP ANTIBODIES SERPL: NORMAL
BUN SERPL-MCNC: 14 MG/DL (ref 7–18)
BUN SERPL-MCNC: 18 MG/DL (ref 7–18)
BUN/CREAT SERPL: 15 (ref 12–20)
BUN/CREAT SERPL: 23 (ref 12–20)
CALCIUM SERPL-MCNC: 8.8 MG/DL (ref 8.5–10.1)
CALCIUM SERPL-MCNC: 9.1 MG/DL (ref 8.5–10.1)
CHLORIDE SERPL-SCNC: 104 MMOL/L (ref 100–111)
CHLORIDE SERPL-SCNC: 108 MMOL/L (ref 100–111)
CO2 SERPL-SCNC: 30 MMOL/L (ref 21–32)
CO2 SERPL-SCNC: 33 MMOL/L (ref 21–32)
CREAT SERPL-MCNC: 0.79 MG/DL (ref 0.6–1.3)
CREAT SERPL-MCNC: 0.96 MG/DL (ref 0.6–1.3)
DIFFERENTIAL METHOD BLD: ABNORMAL
EOSINOPHIL # BLD: 0 K/UL (ref 0–0.4)
EOSINOPHIL NFR BLD: 0 % (ref 0–5)
ERYTHROCYTE [DISTWIDTH] IN BLOOD BY AUTOMATED COUNT: 14.6 % (ref 11.6–14.5)
GLUCOSE BLD STRIP.AUTO-MCNC: 182 MG/DL (ref 70–110)
GLUCOSE SERPL-MCNC: 119 MG/DL (ref 74–99)
GLUCOSE SERPL-MCNC: 191 MG/DL (ref 74–99)
HCG UR QL: NEGATIVE
HCT VFR BLD AUTO: 37 % (ref 35–45)
HGB BLD-MCNC: 11.4 G/DL (ref 12–16)
LYMPHOCYTES # BLD: 0.6 K/UL (ref 0.9–3.6)
LYMPHOCYTES NFR BLD: 6 % (ref 21–52)
MCH RBC QN AUTO: 30.6 PG (ref 24–34)
MCHC RBC AUTO-ENTMCNC: 30.8 G/DL (ref 31–37)
MCV RBC AUTO: 99.5 FL (ref 74–97)
MONOCYTES # BLD: 0.3 K/UL (ref 0.05–1.2)
MONOCYTES NFR BLD: 4 % (ref 3–10)
NEUTS SEG # BLD: 8.2 K/UL (ref 1.8–8)
NEUTS SEG NFR BLD: 90 % (ref 40–73)
PLATELET # BLD AUTO: 223 K/UL (ref 135–420)
PMV BLD AUTO: 11.1 FL (ref 9.2–11.8)
POTASSIUM SERPL-SCNC: 4 MMOL/L (ref 3.5–5.5)
POTASSIUM SERPL-SCNC: 5.1 MMOL/L (ref 3.5–5.5)
RBC # BLD AUTO: 3.72 M/UL (ref 4.2–5.3)
SODIUM SERPL-SCNC: 139 MMOL/L (ref 136–145)
SODIUM SERPL-SCNC: 142 MMOL/L (ref 136–145)
SPECIMEN EXP DATE BLD: NORMAL
WBC # BLD AUTO: 9 K/UL (ref 4.6–13.2)

## 2021-03-31 PROCEDURE — 82962 GLUCOSE BLOOD TEST: CPT

## 2021-03-31 PROCEDURE — 76210000006 HC OR PH I REC 0.5 TO 1 HR: Performed by: ORTHOPAEDIC SURGERY

## 2021-03-31 PROCEDURE — 80048 BASIC METABOLIC PNL TOTAL CA: CPT

## 2021-03-31 PROCEDURE — 77030000032 HC CUF TRNQT ZIMM -B: Performed by: ORTHOPAEDIC SURGERY

## 2021-03-31 PROCEDURE — 74011250636 HC RX REV CODE- 250/636: Performed by: PHYSICIAN ASSISTANT

## 2021-03-31 PROCEDURE — 99218 HC RM OBSERVATION: CPT

## 2021-03-31 PROCEDURE — 77030040815: Performed by: ORTHOPAEDIC SURGERY

## 2021-03-31 PROCEDURE — 77030040361 HC SLV COMPR DVT MDII -B: Performed by: ORTHOPAEDIC SURGERY

## 2021-03-31 PROCEDURE — 76010000131 HC OR TIME 2 TO 2.5 HR: Performed by: ORTHOPAEDIC SURGERY

## 2021-03-31 PROCEDURE — 77030012551: Performed by: ORTHOPAEDIC SURGERY

## 2021-03-31 PROCEDURE — 77030033067 HC SUT PDO STRATFX SPIR J&J -B: Performed by: ORTHOPAEDIC SURGERY

## 2021-03-31 PROCEDURE — 77030038010: Performed by: ORTHOPAEDIC SURGERY

## 2021-03-31 PROCEDURE — 77030013708 HC HNDPC SUC IRR PULS STRY –B: Performed by: ORTHOPAEDIC SURGERY

## 2021-03-31 PROCEDURE — C1776 JOINT DEVICE (IMPLANTABLE): HCPCS | Performed by: ORTHOPAEDIC SURGERY

## 2021-03-31 PROCEDURE — 77030034479 HC ADH SKN CLSR PRINEO J&J -B: Performed by: ORTHOPAEDIC SURGERY

## 2021-03-31 PROCEDURE — 74011000250 HC RX REV CODE- 250: Performed by: STUDENT IN AN ORGANIZED HEALTH CARE EDUCATION/TRAINING PROGRAM

## 2021-03-31 PROCEDURE — 74011250636 HC RX REV CODE- 250/636: Performed by: ORTHOPAEDIC SURGERY

## 2021-03-31 PROCEDURE — 74011000258 HC RX REV CODE- 258: Performed by: ORTHOPAEDIC SURGERY

## 2021-03-31 PROCEDURE — 64447 NJX AA&/STRD FEMORAL NRV IMG: CPT | Performed by: ORTHOPAEDIC SURGERY

## 2021-03-31 PROCEDURE — 74011000250 HC RX REV CODE- 250: Performed by: ORTHOPAEDIC SURGERY

## 2021-03-31 PROCEDURE — 85025 COMPLETE CBC W/AUTO DIFF WBC: CPT

## 2021-03-31 PROCEDURE — 77030011628: Performed by: ORTHOPAEDIC SURGERY

## 2021-03-31 PROCEDURE — 86901 BLOOD TYPING SEROLOGIC RH(D): CPT

## 2021-03-31 PROCEDURE — 36415 COLL VENOUS BLD VENIPUNCTURE: CPT

## 2021-03-31 PROCEDURE — C9290 INJ, BUPIVACAINE LIPOSOME: HCPCS | Performed by: ORTHOPAEDIC SURGERY

## 2021-03-31 PROCEDURE — 74011250636 HC RX REV CODE- 250/636: Performed by: STUDENT IN AN ORGANIZED HEALTH CARE EDUCATION/TRAINING PROGRAM

## 2021-03-31 PROCEDURE — 77030020269 HC MISC IMPL: Performed by: ORTHOPAEDIC SURGERY

## 2021-03-31 PROCEDURE — C1713 ANCHOR/SCREW BN/BN,TIS/BN: HCPCS | Performed by: ORTHOPAEDIC SURGERY

## 2021-03-31 PROCEDURE — 74011000250 HC RX REV CODE- 250: Performed by: NURSE ANESTHETIST, CERTIFIED REGISTERED

## 2021-03-31 PROCEDURE — 77030020782 HC GWN BAIR PAWS FLX 3M -B: Performed by: ORTHOPAEDIC SURGERY

## 2021-03-31 PROCEDURE — 77030014144 HC TY SPN ANES BBMI -B: Performed by: STUDENT IN AN ORGANIZED HEALTH CARE EDUCATION/TRAINING PROGRAM

## 2021-03-31 PROCEDURE — 76060000035 HC ANESTHESIA 2 TO 2.5 HR: Performed by: ORTHOPAEDIC SURGERY

## 2021-03-31 PROCEDURE — 77030002933 HC SUT MCRYL J&J -A: Performed by: ORTHOPAEDIC SURGERY

## 2021-03-31 PROCEDURE — 74011000250 HC RX REV CODE- 250: Performed by: PHYSICIAN ASSISTANT

## 2021-03-31 PROCEDURE — 2709999900 HC NON-CHARGEABLE SUPPLY: Performed by: ORTHOPAEDIC SURGERY

## 2021-03-31 PROCEDURE — 76942 ECHO GUIDE FOR BIOPSY: CPT | Performed by: ORTHOPAEDIC SURGERY

## 2021-03-31 PROCEDURE — 74011250637 HC RX REV CODE- 250/637: Performed by: PHYSICIAN ASSISTANT

## 2021-03-31 PROCEDURE — 74011250636 HC RX REV CODE- 250/636: Performed by: NURSE ANESTHETIST, CERTIFIED REGISTERED

## 2021-03-31 PROCEDURE — 81025 URINE PREGNANCY TEST: CPT

## 2021-03-31 PROCEDURE — 73560 X-RAY EXAM OF KNEE 1 OR 2: CPT

## 2021-03-31 PROCEDURE — 77030027138 HC INCENT SPIROMETER -A: Performed by: ORTHOPAEDIC SURGERY

## 2021-03-31 PROCEDURE — 77030031139 HC SUT VCRL2 J&J -A: Performed by: ORTHOPAEDIC SURGERY

## 2021-03-31 DEVICE — CEMENT BNE 40GM FULL DOSE PMMA W/ GENT HI VISC RADPQ LNG: Type: IMPLANTABLE DEVICE | Site: KNEE | Status: FUNCTIONAL

## 2021-03-31 DEVICE — JOURNEY II BCS CONSTRAINED                                    ARTICULAR INSERT SIZE 1-2 RIGHT 9MM
Type: IMPLANTABLE DEVICE | Site: KNEE | Status: FUNCTIONAL
Brand: JOURNEY

## 2021-03-31 DEVICE — GENESIS II OVAL RESURFACING                                    PATELLAR 29MM
Type: IMPLANTABLE DEVICE | Site: KNEE | Status: FUNCTIONAL
Brand: GENESIS II

## 2021-03-31 DEVICE — JOURNEY II BCS FEMORAL OXINIUM                                    RIGHT SIZE 4
Type: IMPLANTABLE DEVICE | Site: KNEE | Status: FUNCTIONAL
Brand: JOURNEY

## 2021-03-31 DEVICE — CEMENT BNE 40GM FULL DOSE PMMA W/O ANTIBIO HI VISC N RADPQ: Type: IMPLANTABLE DEVICE | Site: KNEE | Status: FUNCTIONAL

## 2021-03-31 DEVICE — KNEE K1 TOT HEMI STD CEM IMPL CAPPED K1 SN: Type: IMPLANTABLE DEVICE | Site: KNEE | Status: FUNCTIONAL

## 2021-03-31 DEVICE — JOURNEY TIBIAL BASEPLATE NONPOROUS                                    RT SZ 2
Type: IMPLANTABLE DEVICE | Site: KNEE | Status: FUNCTIONAL
Brand: JOURNEY

## 2021-03-31 DEVICE — GENESIS II LONG STEM 10MM X 70MM
Type: IMPLANTABLE DEVICE | Site: KNEE | Status: FUNCTIONAL
Brand: GENESIS II

## 2021-03-31 RX ORDER — OXYCODONE HYDROCHLORIDE 5 MG/1
5 TABLET ORAL
Status: DISCONTINUED | OUTPATIENT
Start: 2021-03-31 | End: 2021-04-01 | Stop reason: HOSPADM

## 2021-03-31 RX ORDER — BUPIVACAINE HYDROCHLORIDE 2.5 MG/ML
INJECTION, SOLUTION EPIDURAL; INFILTRATION; INTRACAUDAL AS NEEDED
Status: DISCONTINUED | OUTPATIENT
Start: 2021-03-31 | End: 2021-03-31 | Stop reason: HOSPADM

## 2021-03-31 RX ORDER — ASPIRIN 81 MG/1
81 TABLET ORAL 2 TIMES DAILY
Status: DISCONTINUED | OUTPATIENT
Start: 2021-03-31 | End: 2021-04-01 | Stop reason: HOSPADM

## 2021-03-31 RX ORDER — SODIUM CHLORIDE, SODIUM LACTATE, POTASSIUM CHLORIDE, CALCIUM CHLORIDE 600; 310; 30; 20 MG/100ML; MG/100ML; MG/100ML; MG/100ML
100 INJECTION, SOLUTION INTRAVENOUS CONTINUOUS
Status: DISCONTINUED | OUTPATIENT
Start: 2021-03-31 | End: 2021-04-01 | Stop reason: HOSPADM

## 2021-03-31 RX ORDER — ROPIVACAINE HYDROCHLORIDE 5 MG/ML
INJECTION, SOLUTION EPIDURAL; INFILTRATION; PERINEURAL
Status: COMPLETED | OUTPATIENT
Start: 2021-03-31 | End: 2021-03-31

## 2021-03-31 RX ORDER — ACETAMINOPHEN 500 MG
1000 TABLET ORAL EVERY 8 HOURS
Status: DISCONTINUED | OUTPATIENT
Start: 2021-03-31 | End: 2021-04-01 | Stop reason: HOSPADM

## 2021-03-31 RX ORDER — NALBUPHINE HYDROCHLORIDE 10 MG/ML
5 INJECTION, SOLUTION INTRAMUSCULAR; INTRAVENOUS; SUBCUTANEOUS
Status: DISCONTINUED | OUTPATIENT
Start: 2021-03-31 | End: 2021-03-31 | Stop reason: HOSPADM

## 2021-03-31 RX ORDER — LORAZEPAM 0.5 MG/1
0.5 TABLET ORAL
Status: DISCONTINUED | OUTPATIENT
Start: 2021-03-31 | End: 2021-04-01 | Stop reason: HOSPADM

## 2021-03-31 RX ORDER — SODIUM CHLORIDE 0.9 % (FLUSH) 0.9 %
5-40 SYRINGE (ML) INJECTION AS NEEDED
Status: DISCONTINUED | OUTPATIENT
Start: 2021-03-31 | End: 2021-03-31

## 2021-03-31 RX ORDER — HYDROMORPHONE HYDROCHLORIDE 1 MG/ML
0.5 INJECTION, SOLUTION INTRAMUSCULAR; INTRAVENOUS; SUBCUTANEOUS AS NEEDED
Status: DISCONTINUED | OUTPATIENT
Start: 2021-03-31 | End: 2021-03-31 | Stop reason: HOSPADM

## 2021-03-31 RX ORDER — ONDANSETRON 2 MG/ML
4 INJECTION INTRAMUSCULAR; INTRAVENOUS
Status: DISCONTINUED | OUTPATIENT
Start: 2021-03-31 | End: 2021-04-01 | Stop reason: HOSPADM

## 2021-03-31 RX ORDER — BUPIVACAINE HYDROCHLORIDE 7.5 MG/ML
INJECTION, SOLUTION INTRASPINAL AS NEEDED
Status: DISCONTINUED | OUTPATIENT
Start: 2021-03-31 | End: 2021-03-31 | Stop reason: HOSPADM

## 2021-03-31 RX ORDER — TRANEXAMIC ACID 10 MG/ML
1 INJECTION, SOLUTION INTRAVENOUS
Status: COMPLETED | OUTPATIENT
Start: 2021-03-31 | End: 2021-03-31

## 2021-03-31 RX ORDER — OXYCODONE HYDROCHLORIDE 5 MG/1
10 TABLET ORAL
Status: DISCONTINUED | OUTPATIENT
Start: 2021-03-31 | End: 2021-04-01 | Stop reason: HOSPADM

## 2021-03-31 RX ORDER — DIPHENHYDRAMINE HCL 25 MG
25 CAPSULE ORAL
Status: DISCONTINUED | OUTPATIENT
Start: 2021-03-31 | End: 2021-04-01 | Stop reason: HOSPADM

## 2021-03-31 RX ORDER — AMOXICILLIN 250 MG
1 CAPSULE ORAL 2 TIMES DAILY
Status: DISCONTINUED | OUTPATIENT
Start: 2021-03-31 | End: 2021-04-01 | Stop reason: HOSPADM

## 2021-03-31 RX ORDER — SODIUM CHLORIDE, SODIUM LACTATE, POTASSIUM CHLORIDE, CALCIUM CHLORIDE 600; 310; 30; 20 MG/100ML; MG/100ML; MG/100ML; MG/100ML
125 INJECTION, SOLUTION INTRAVENOUS CONTINUOUS
Status: DISCONTINUED | OUTPATIENT
Start: 2021-03-31 | End: 2021-03-31

## 2021-03-31 RX ORDER — HYDROMORPHONE HYDROCHLORIDE 1 MG/ML
1 INJECTION, SOLUTION INTRAMUSCULAR; INTRAVENOUS; SUBCUTANEOUS
Status: DISCONTINUED | OUTPATIENT
Start: 2021-03-31 | End: 2021-04-01 | Stop reason: HOSPADM

## 2021-03-31 RX ORDER — PANTOPRAZOLE SODIUM 40 MG/1
40 TABLET, DELAYED RELEASE ORAL
Status: DISCONTINUED | OUTPATIENT
Start: 2021-04-01 | End: 2021-04-01 | Stop reason: HOSPADM

## 2021-03-31 RX ORDER — SODIUM CHLORIDE 0.9 % (FLUSH) 0.9 %
5-40 SYRINGE (ML) INJECTION AS NEEDED
Status: DISCONTINUED | OUTPATIENT
Start: 2021-03-31 | End: 2021-04-01 | Stop reason: HOSPADM

## 2021-03-31 RX ORDER — PREGABALIN 75 MG/1
150 CAPSULE ORAL 2 TIMES DAILY
Status: DISCONTINUED | OUTPATIENT
Start: 2021-03-31 | End: 2021-04-01 | Stop reason: HOSPADM

## 2021-03-31 RX ORDER — PROPOFOL 10 MG/ML
INJECTION, EMULSION INTRAVENOUS AS NEEDED
Status: DISCONTINUED | OUTPATIENT
Start: 2021-03-31 | End: 2021-03-31 | Stop reason: HOSPADM

## 2021-03-31 RX ORDER — DULOXETIN HYDROCHLORIDE 60 MG/1
60 CAPSULE, DELAYED RELEASE ORAL DAILY
Status: DISCONTINUED | OUTPATIENT
Start: 2021-04-01 | End: 2021-04-01 | Stop reason: HOSPADM

## 2021-03-31 RX ORDER — DEXAMETHASONE SODIUM PHOSPHATE 4 MG/ML
INJECTION, SOLUTION INTRA-ARTICULAR; INTRALESIONAL; INTRAMUSCULAR; INTRAVENOUS; SOFT TISSUE
Status: COMPLETED | OUTPATIENT
Start: 2021-03-31 | End: 2021-03-31

## 2021-03-31 RX ORDER — FENTANYL CITRATE 50 UG/ML
INJECTION, SOLUTION INTRAMUSCULAR; INTRAVENOUS
Status: COMPLETED | OUTPATIENT
Start: 2021-03-31 | End: 2021-03-31

## 2021-03-31 RX ORDER — SODIUM CHLORIDE 0.9 % (FLUSH) 0.9 %
5-40 SYRINGE (ML) INJECTION EVERY 8 HOURS
Status: DISCONTINUED | OUTPATIENT
Start: 2021-03-31 | End: 2021-04-01 | Stop reason: HOSPADM

## 2021-03-31 RX ORDER — NALOXONE HYDROCHLORIDE 0.4 MG/ML
0.4 INJECTION, SOLUTION INTRAMUSCULAR; INTRAVENOUS; SUBCUTANEOUS AS NEEDED
Status: DISCONTINUED | OUTPATIENT
Start: 2021-03-31 | End: 2021-04-01 | Stop reason: HOSPADM

## 2021-03-31 RX ORDER — SODIUM CHLORIDE 0.9 % (FLUSH) 0.9 %
5-40 SYRINGE (ML) INJECTION EVERY 8 HOURS
Status: DISCONTINUED | OUTPATIENT
Start: 2021-03-31 | End: 2021-03-31 | Stop reason: HOSPADM

## 2021-03-31 RX ORDER — ONDANSETRON 2 MG/ML
INJECTION INTRAMUSCULAR; INTRAVENOUS AS NEEDED
Status: DISCONTINUED | OUTPATIENT
Start: 2021-03-31 | End: 2021-03-31 | Stop reason: HOSPADM

## 2021-03-31 RX ORDER — CEFAZOLIN SODIUM/WATER 2 G/20 ML
2 SYRINGE (ML) INTRAVENOUS ONCE
Status: COMPLETED | OUTPATIENT
Start: 2021-03-31 | End: 2021-03-31

## 2021-03-31 RX ORDER — KETOROLAC TROMETHAMINE 30 MG/ML
15 INJECTION, SOLUTION INTRAMUSCULAR; INTRAVENOUS EVERY 6 HOURS
Status: DISCONTINUED | OUTPATIENT
Start: 2021-03-31 | End: 2021-04-01 | Stop reason: HOSPADM

## 2021-03-31 RX ORDER — LIDOCAINE HYDROCHLORIDE 10 MG/ML
INJECTION INFILTRATION; PERINEURAL AS NEEDED
Status: DISCONTINUED | OUTPATIENT
Start: 2021-03-31 | End: 2021-03-31 | Stop reason: HOSPADM

## 2021-03-31 RX ORDER — SODIUM CHLORIDE 0.9 % (FLUSH) 0.9 %
5-40 SYRINGE (ML) INJECTION AS NEEDED
Status: DISCONTINUED | OUTPATIENT
Start: 2021-03-31 | End: 2021-03-31 | Stop reason: HOSPADM

## 2021-03-31 RX ORDER — PROPOFOL 10 MG/ML
INJECTION, EMULSION INTRAVENOUS
Status: DISCONTINUED | OUTPATIENT
Start: 2021-03-31 | End: 2021-03-31 | Stop reason: HOSPADM

## 2021-03-31 RX ORDER — CEFAZOLIN SODIUM/WATER 2 G/20 ML
2 SYRINGE (ML) INTRAVENOUS EVERY 8 HOURS
Status: COMPLETED | OUTPATIENT
Start: 2021-03-31 | End: 2021-04-01

## 2021-03-31 RX ORDER — LANOLIN ALCOHOL/MO/W.PET/CERES
1 CREAM (GRAM) TOPICAL
Status: DISCONTINUED | OUTPATIENT
Start: 2021-04-01 | End: 2021-04-01 | Stop reason: HOSPADM

## 2021-03-31 RX ORDER — LIDOCAINE HYDROCHLORIDE 20 MG/ML
INJECTION, SOLUTION EPIDURAL; INFILTRATION; INTRACAUDAL; PERINEURAL AS NEEDED
Status: DISCONTINUED | OUTPATIENT
Start: 2021-03-31 | End: 2021-03-31 | Stop reason: HOSPADM

## 2021-03-31 RX ORDER — FENTANYL CITRATE 50 UG/ML
50 INJECTION, SOLUTION INTRAMUSCULAR; INTRAVENOUS
Status: DISCONTINUED | OUTPATIENT
Start: 2021-03-31 | End: 2021-03-31 | Stop reason: HOSPADM

## 2021-03-31 RX ORDER — SODIUM CHLORIDE, SODIUM LACTATE, POTASSIUM CHLORIDE, CALCIUM CHLORIDE 600; 310; 30; 20 MG/100ML; MG/100ML; MG/100ML; MG/100ML
50 INJECTION, SOLUTION INTRAVENOUS CONTINUOUS
Status: DISCONTINUED | OUTPATIENT
Start: 2021-03-31 | End: 2021-03-31 | Stop reason: HOSPADM

## 2021-03-31 RX ORDER — MIDAZOLAM HYDROCHLORIDE 1 MG/ML
INJECTION, SOLUTION INTRAMUSCULAR; INTRAVENOUS
Status: COMPLETED | OUTPATIENT
Start: 2021-03-31 | End: 2021-03-31

## 2021-03-31 RX ORDER — SODIUM CHLORIDE 0.9 % (FLUSH) 0.9 %
5-40 SYRINGE (ML) INJECTION EVERY 8 HOURS
Status: DISCONTINUED | OUTPATIENT
Start: 2021-03-31 | End: 2021-03-31

## 2021-03-31 RX ORDER — NALOXONE HYDROCHLORIDE 0.4 MG/ML
0.04 INJECTION, SOLUTION INTRAMUSCULAR; INTRAVENOUS; SUBCUTANEOUS
Status: DISCONTINUED | OUTPATIENT
Start: 2021-03-31 | End: 2021-03-31 | Stop reason: HOSPADM

## 2021-03-31 RX ORDER — DIPHENHYDRAMINE HYDROCHLORIDE 50 MG/ML
12.5 INJECTION, SOLUTION INTRAMUSCULAR; INTRAVENOUS
Status: DISCONTINUED | OUTPATIENT
Start: 2021-03-31 | End: 2021-03-31 | Stop reason: HOSPADM

## 2021-03-31 RX ORDER — DEXMEDETOMIDINE HYDROCHLORIDE 100 UG/ML
INJECTION, SOLUTION INTRAVENOUS
Status: COMPLETED | OUTPATIENT
Start: 2021-03-31 | End: 2021-03-31

## 2021-03-31 RX ADMIN — ROPIVACAINE HYDROCHLORIDE 20 ML: 5 INJECTION, SOLUTION EPIDURAL; INFILTRATION; PERINEURAL at 09:44

## 2021-03-31 RX ADMIN — SODIUM CHLORIDE, SODIUM LACTATE, POTASSIUM CHLORIDE, AND CALCIUM CHLORIDE 100 ML/HR: 600; 310; 30; 20 INJECTION, SOLUTION INTRAVENOUS at 15:00

## 2021-03-31 RX ADMIN — SENNOSIDES AND DOCUSATE SODIUM 1 TABLET: 8.6; 5 TABLET ORAL at 20:32

## 2021-03-31 RX ADMIN — TRANEXAMIC ACID 1 G: 10 INJECTION, SOLUTION INTRAVENOUS at 10:35

## 2021-03-31 RX ADMIN — KETOROLAC TROMETHAMINE 15 MG: 30 INJECTION, SOLUTION INTRAMUSCULAR at 18:16

## 2021-03-31 RX ADMIN — FENTANYL CITRATE 100 MCG: 50 INJECTION, SOLUTION INTRAMUSCULAR; INTRAVENOUS at 09:42

## 2021-03-31 RX ADMIN — PROPOFOL 30 MG: 10 INJECTION, EMULSION INTRAVENOUS at 10:30

## 2021-03-31 RX ADMIN — DEXMEDETOMIDINE HYDROCHLORIDE 25 MCG: 100 INJECTION, SOLUTION INTRAVENOUS at 09:42

## 2021-03-31 RX ADMIN — CEFAZOLIN 2 G: 1 INJECTION, POWDER, FOR SOLUTION INTRAVENOUS at 10:30

## 2021-03-31 RX ADMIN — MIDAZOLAM 2 MG: 1 INJECTION INTRAMUSCULAR; INTRAVENOUS at 09:42

## 2021-03-31 RX ADMIN — CEFAZOLIN SODIUM 2 G: 100 INJECTION, POWDER, LYOPHILIZED, FOR SOLUTION INTRAVENOUS at 18:16

## 2021-03-31 RX ADMIN — SODIUM CHLORIDE, SODIUM LACTATE, POTASSIUM CHLORIDE, AND CALCIUM CHLORIDE 1000 ML: 600; 310; 30; 20 INJECTION, SOLUTION INTRAVENOUS at 17:00

## 2021-03-31 RX ADMIN — SODIUM CHLORIDE, SODIUM LACTATE, POTASSIUM CHLORIDE, AND CALCIUM CHLORIDE 125 ML/HR: 600; 310; 30; 20 INJECTION, SOLUTION INTRAVENOUS at 08:59

## 2021-03-31 RX ADMIN — PREGABALIN 150 MG: 75 CAPSULE ORAL at 20:32

## 2021-03-31 RX ADMIN — ROPIVACAINE HYDROCHLORIDE 20 ML: 5 INJECTION, SOLUTION EPIDURAL; INFILTRATION; PERINEURAL at 09:42

## 2021-03-31 RX ADMIN — LIDOCAINE HYDROCHLORIDE 40 MG: 20 INJECTION, SOLUTION EPIDURAL; INFILTRATION; INTRACAUDAL; PERINEURAL at 10:30

## 2021-03-31 RX ADMIN — BUPIVACAINE HYDROCHLORIDE IN DEXTROSE 2 ML: 7.5 INJECTION, SOLUTION SUBARACHNOID at 10:25

## 2021-03-31 RX ADMIN — ASPIRIN 81 MG: 81 TABLET, COATED ORAL at 20:32

## 2021-03-31 RX ADMIN — ONDANSETRON HYDROCHLORIDE 4 MG: 2 INJECTION INTRAMUSCULAR; INTRAVENOUS at 10:39

## 2021-03-31 RX ADMIN — LIDOCAINE HYDROCHLORIDE 3 ML: 10 INJECTION, SOLUTION INFILTRATION; PERINEURAL at 10:23

## 2021-03-31 RX ADMIN — DEXAMETHASONE SODIUM PHOSPHATE 4 MG: 4 INJECTION, SOLUTION INTRAMUSCULAR; INTRAVENOUS at 09:42

## 2021-03-31 RX ADMIN — TRANEXAMIC ACID 1 G: 10 INJECTION, SOLUTION INTRAVENOUS at 11:55

## 2021-03-31 RX ADMIN — OXYCODONE 10 MG: 5 TABLET ORAL at 20:32

## 2021-03-31 RX ADMIN — ACETAMINOPHEN 1000 MG: 500 TABLET, FILM COATED ORAL at 18:16

## 2021-03-31 RX ADMIN — SODIUM CHLORIDE, SODIUM LACTATE, POTASSIUM CHLORIDE, AND CALCIUM CHLORIDE: 600; 310; 30; 20 INJECTION, SOLUTION INTRAVENOUS at 12:19

## 2021-03-31 RX ADMIN — PROPOFOL 50 MCG/KG/MIN: 10 INJECTION, EMULSION INTRAVENOUS at 10:30

## 2021-03-31 NOTE — ANESTHESIA POSTPROCEDURE EVALUATION
Post-Anesthesia Evaluation and Assessment    Cardiovascular Function/Vital Signs  Visit Vitals  /65   Pulse 74   Temp 36.8 °C (98.2 °F)   Resp 8   Ht 5' 6\" (1.676 m)   Wt 109.5 kg (241 lb 6 oz)   SpO2 100%   BMI 38.96 kg/m²       Patient is status post Procedure(s):  RIGHT TOTAL KNEE ARTHROPLASTY [facility] AND NEPHEW), \"SPEC POP\". Nausea/Vomiting: Controlled. Postoperative hydration reviewed and adequate. Pain:  Pain Scale 1: Numeric (0 - 10) (03/31/21 1245)  Pain Intensity 1: 1 (03/31/21 1245)   Managed. Neurological Status:   Neuro (WDL): Exceptions to WDL (03/31/21 1245)   At baseline. Mental Status and Level of Consciousness: Baseline and appropriate for discharge. Pulmonary Status:   O2 Device: Nasal cannula (03/31/21 1230)   Adequate oxygenation and airway patent. Complications related to anesthesia: None    Post-anesthesia assessment completed. No concerns. Patient has met all discharge requirements.     Signed By: Kristyn López MD    March 31, 2021

## 2021-03-31 NOTE — OP NOTES
[address]  [address] [place] [address], [phone]    RIGHT TOTAL KNEE ARTHROPLASTY    Patient: Ziggy Yousif MRN: [id number]  SSN: [id number]    YOB: 1970  Age: 46 y.o. Sex: female      Date of Surgery: 3/31/2021   Preoperative Diagnosis: OSTEOARTHRITIS RIGHT KNEE   Postoperative Diagnosis: OSTEOARTHRITIS RIGHT KNEE   Location: [facility]  Surgeon: Madiha Benavidez MD  Physician Assistant: JAZMINE Alvarado was medically necessary for holding of retractors for exposure and to complete the case. Assistant: Francine Jimenez  Physician Assistant: JAZMINE Nuñez-ARNIE  Scrub Tech-2: Christiano Peres  Scrub RN-1: Vipul Gavin RN  Retractor Das: Martha Ny    Anesthesia: Spinal + Low femoral Nerve Block + local    Procedure: Right Total Knee Arthroplasty (CPT: 16499)    Findings:  Degenerative joint disease of the right knee     Estimated Blood Loss: 100 mL    Fluids: see anesthesia record    Specimens: None    Cultures: None    Complications: None    Tourniquet Time:   Total Tourniquet Time Documented:  Thigh (Right) - 59 minutes  Total: Thigh (Right) - 59 minutes      Tourniquet Pressure: 330 mm Hg    Tranexamic Acid: 1 gram IV: one dose at begining of case and one at the end    Exparel solution: 20 mL (13 mg/mL) + 40 mL NS    Implants:   Implant Name Type Inv.  Item Serial No.  Lot No. LRB No. Used Action   CEMENT BNE 40GM FULL DOSE PMMA W/O ANTIBIO HI VISC N RADPQ - [id number]  CEMENT BNE 40GM FULL DOSE PMMA W/O ANTIBIO HI VISC N RADPQ  [facility] [employer]WD [id number] Right 1 Implanted   CEMENT BNE 40GM FULL DOSE PMMA W/ GENT HI VISC RADPQ LNG - [id number]  CEMENT BNE 40GM FULL DOSE PMMA W/ GENT HI VISC RADPQ LNG  [facility] [employer]WD [id number] Right 1 Implanted   COMPONENT FEM SZ 4 R KNEE OXINIUM BI CRUCE STBL JOURNEY II - [id number]  COMPONENT FEM SZ 4 R KNEE OXINIUM BI CRUCE STBL Rosalina Jonathanjessica [employer]WD [id number] Right 1 Implanted   BASEPLATE TIB SZ 2 [id number] ML64MM R KNEE NP BRANDI LINCOLN - [id number]  BASEPLATE TIB SZ 2 [id number] ML64MM R KNEE NP BRANDI Peguero [employer]IN [employer]WD [id number] Right 1 Implanted   PAT OVL RESURF FAYE II 29MM --  - [id number]  PAT OVL RESURF FAYE II 29MM --   NARAYANAN [employer] [id number] Right 1 Implanted   STEM FEM L70MM OD10MM LNG GEN II - [id number]  STEM FEM L70MM OD10MM LNG GEN II  [employer]WD [id number] Right 1 Implanted   INSERT TIB SZ 1-2 THK9MM RT KNEE BI CRUC STBL CONSTRN [facility] - [id number]  INSERT TIB SZ 1-2 THK9MM RT KNEE BI CRUC STBL CONSTRN Nico Waldrop AND NEPHE[employer] [facility] [id number] Right 1 Implanted        Patient Condition: Stable.    ---------  INDICATIONS:   This 46y.o.-year-old female has had pain in the right knee for years and has become functionally disabled with respect to the activities of daily living. The pain is increased with weight-bearing. The pain and dysfunction were not releaved by at least three months of conservative therapy such as NSAIDS (ibuprofen, aleve), analgesics (tylenol), structured flexibility and muscle strengthening physical therapy exercises, activity restrictions, intra-articular cortisone injections, bracing, and use of a cane. The radiographs showed varus alignment and advanced arthritis with joint space narrowing, subchondral sclerosis, and periarticular osteophytes. A right total knee replacement has been recommended. The risks and the possible complications of this surgery and anesthesia including, but not exclusive to, bleeding, infection, dislocation, fracture, blood clots, nerve and vascular injury, possible need for other procedures, and possibly death have been explained to the patient.   The patient accepts these risks for the benefits of joint replacement over the failure of non-operative care to provide adequate pain relief and improve their functional disability. The patients medical problems have been addressed by their primary care physician and are deemed stable and as well controlled as possible. Inpatient hospital care was medically necessary, reasonable, and appropriate. This is a medically necessary procedure. As such, without use of a surgical assistant to retract soft tissues, protect vital neuro-vascular structures and assist in the technical aspects of the operation, this procedure would not have been possible. Therefore this assistant was medically necessary. DESCRIPTION OF PROCEDURE:    After the risks and benefits of surgery were discussed, informed consent was obtained. The patient was identified in the preoperative holding area and the operative extremity was marked. Preoperatively a low femoral nerve block was performed by anesthesia at the level of the adductor canal.  The patient was taken to the operating room and placed in the supine position. Spinal anesthesia was performed. IV antibiotics were given. After verification of the appropriate operative site from the consent form, with confirmation of surgeon, anesthesia and nursing teams, a tourniquet was placed and the right leg was prepped and draped in sterile fashion using Chloraprep. A formal timeout was performed. The tourniquet was inflated and a midline incision was made over the anterior knee medial to the tibial tubercle and the dissection was taken down to Jayy's fascia. A medial parapatellar arthrotomy was performed, medial release was made and the infrapatellar fat pad was trimmed. The anterior portions of the medial and lateral menisci were excised. The patella measured 21 mm. A freehand patellar cut was made followed by placement of the protective plate. The knee was flexed and the patella was  allowed to subluxate into the lateral gutter and the knee was flexed.   Inspection of the knee revealed cartilage loss from all three compartments greatest medially. The femur was drilled and intramedullary cutting jig was placed in 5 degrees of valgus and 9 mm of distal resection. The distal femoral cut was made. The tibia was then subluxed anteriorly with a large bent knee retractor. The PCL was released from the posterior tibia. The remaining medial and lateral menisci and the periarticular osteophytes were removed. The lateral genicular artery was cauterized. An extramedullary tibial guide was used and a tibial cut was made just inferior to the osteoarticular junction with tibial slope of 5 degrees - matching the patient's native slope. The extension gap was assessed to ensure full extension could be achieved. The PCL was released. A sizing tibial plate was then pinned into place and alignment was checked. The tibia was reamed for a 10 x 70 mm stem and broached and any additional osteophytes were removed. The popliteus was prominent so it was released. The femoral sizer was used to measure the femoral size as well as estimate femoral component rotation using the posterior condylar line as a reference. Gap measuring blocks and the gap /sizing device was then used to examine flexion and extension gaps and confirm femoral component size and rotation. Positioning pins for the distal femoral cutting block were placed into the femur through the /sizing device. The femoral cutting block was placed. Anterior-posterior position of the cutting guide was checked using a [employer]. The anterior, posterior and chamfer cuts were made. Posterior osteophytes were removed. Tibial and femoral trial implants were placed. The trochlear sulcus bone was removed. The patella preparation was then completed with sizing and drilling of the patellar lugs. The trial patellar implant was placed and measured 20 mm. Patellar tracking was midline so no lateral retinacular release was needed.   Ligament balancing was performed as needed with release of MCL done on approach as well as the PCL and popliteus. The PCL was sacrificed and there was some lateral laxity in flexion so a constrained BCS implant was used. Excellent stability was achieved. The trial components were removed. 20 cc of Exparel solution and 10 cc of 0.25% Marcaine were injected into the posterior soft tissues. Care was taken to aspirate prior to injecting to prevent intravascular anesthetic injection. The cement was prepared. After preparing the bony surfaces with pulsatile lavage saline, the real components were cemented into place with the final liner. Excess cement was removed prior to hardening with the cement allowed to set up with axial pressure across the knee in full extension. The remaining 40 cc of Exparel solution and an additional 20 cc of 0.25% Marcaine were injected into the deep and superficial soft tissues around the knee as well as the periosteum. After drying of the cement, the knee was checked for any remaining excess cement and irrigated. The tourniquet was released and hemostasis was achieved. A standard layered closure was performed using #1 Stratafix PDS for the fascia, 0 and 3-0 Vicryl for the subcutaneous and subcuticular layers followed by a running subcuticular skin closure with a 3-0 Monocryl. PRINEO was applied. Sterile dressings were placed. The patient was awakened and there were no complications. Final sponge and needle counts were correct x2.     Angeline Fox MD

## 2021-03-31 NOTE — ANESTHESIA PROCEDURE NOTES
Spinal Block    Start time: 3/31/2021 10:23 AM  End time: 3/31/2021 10:25 AM  Performed by: Nat Brown CRNA  Authorized by: Rosalee Laguna MD     Pre-procedure: Indications: primary anesthetic  Preanesthetic Checklist: patient identified, risks and benefits discussed, anesthesia consent, site marked, patient being monitored and timeout performed    Timeout Time: 10:23          Spinal Block:   Patient Position:  Seated  Prep Region:  Lumbar  Prep: chlorhexidine and patient draped      Location:  T2-3  Technique:  Single shot        Needle:   Needle Type:   Madhu  Needle Gauge:  25 G  Attempts:  1      Events: CSF confirmed, no blood with aspiration and no paresthesia        Assessment:  Insertion:  Uncomplicated  Patient tolerance:  Patient tolerated the procedure well with no immediate complications

## 2021-03-31 NOTE — PERIOP NOTES
Family updated on patient status and progression of care, room 204 assigned. Daniella Abel RN assigned to patient and made aware of SBAR ready for review.

## 2021-03-31 NOTE — ANESTHESIA PROCEDURE NOTES
Peripheral Block    Start time: 3/31/2021 9:40 AM  End time: 3/31/2021 9:42 AM  Performed by: Walt Santizo MD  Authorized by: Walt Santizo MD       Pre-procedure: Indications: at surgeon's request and post-op pain management    Preanesthetic Checklist: patient identified, risks and benefits discussed, site marked, timeout performed, anesthesia consent given and patient being monitored    Timeout Time: 09:40          Block Type:   Block Type:   Adductor canal  Laterality:  Right  Monitoring:  Standard ASA monitoring, responsive to questions, oxygen, continuous pulse ox, frequent vital sign checks and heart rate  Injection Technique:  Single shot  Procedures: ultrasound guided    Patient Position: supine  Prep: chlorhexidine    Location:  Mid thigh  Needle Type:  Stimuplex  Needle Gauge:  21 G  Needle Localization:  Ultrasound guidance  Medication Injected:  FentaNYL citrate (PF) injection sedation initial, 100 mcg  midazolam (VERSED) injection, 2 mg  ropivacaine (PF) (NAROPIN)(0.5%) 5 mg/mL injection, 20 mL  dexamethasone (DECADRON) 4 mg/mL injection, 4 mg  dexmedeTOMidine (PRECEDEX) 100 mcg/mL iv solution, 25 mcg  Med Admin Time: 3/31/2021 9:42 AM    Assessment:  Number of attempts:  1  Injection Assessment:  Incremental injection every 5 mL, negative aspiration for CSF, no paresthesia, ultrasound image on chart, local visualized surrounding nerve on ultrasound, negative aspiration for blood and no intravascular symptoms  Patient tolerance:  Patient tolerated the procedure well with no immediate complications

## 2021-03-31 NOTE — INTERVAL H&P NOTE
Update History & Physical    The Patient's History and Physical was reviewed with the patient and I examined the patient. There was no change. The surgical site was confirmed by the patient and me. Plan:  The risk, benefits, expected outcome, and alternative to the recommended procedure have been discussed with the patient. Patient understands and wants to proceed with the procedure.     Electronically signed by Gloria Buckley MD on 3/31/2021 at 9:52 AM

## 2021-03-31 NOTE — PROGRESS NOTES
Transition of Care (ESTEPHANIE) Plan: [address]         Pt admitted for an elective surgical procedure/Right Total Knee Arthroplasty/.  Pt is independent. Please encourage ambulation. No transition of care needs identified at this time. Anticipate pt will be medically stable for discharge within the next 24-48 hours with physician follow up. CM met with patient and spouse at bedside to discuss d/c plan. Pt agreeable to [facility]. FOC offered and patient chose [facility]. Pt states that she has a RW at home. Pt's  will transport her home and assist with her care at home. CMS will place referral with [facility]. ESTEPHANIE Transportation:   How is patient being transported at discharge? Family/Friend      When? Once cleared by physician     Is transport scheduled? N/A      Follow-up appointment and transportation:   PCP/Specialist?  See AVS for Appointment         Who is transporting to the follow-up appointment? Self/Family/Friend      Is transport for follow up appointment scheduled? N/A    Communication plan (with patient/family): Who is being called? Patient or Next of Kin? Responsible party? Patient      What number(s) is to be used? See Facesheet      What service provider is calling for [facility] services? When are they calling? Readmission Risk? (Green/Low; Yellow/Moderate; Red/High):  [employer] here to complete [employer] including selection of the Healthcare Decision Maker Relationship (ie \"Primary\")    Care Management Interventions  PCP Verified by CM: Yes  Last Visit to PCP: 03/01/21  Mode of Transport at Discharge:  Other (see comment)(Spouse)  Transition of Care Consult (CM Consult): Home Health([address] Ted Shi)  [address]: No  Reason Outside [place]: Physician referred to specific agency  Physical Therapy Consult: Yes  Occupational Therapy Consult: Yes  Current Support Network: Lives with Spouse  Confirm Follow Up Transport: Family  The Plan for Transition of Care is Related to the Following Treatment Goals : Right Total Knee Arthroplasty  The Patient and/or Patient Representative was Provided with a Choice of Provider and Agrees with the Discharge Plan?: Yes  Name of the Patient Representative Who was Provided with a Choice of Provider and Agrees with the Discharge Plan: Vale Souza  Freedom of Choice List was Provided with Basic Dialogue that Supports the Patient's Individualized Plan of Care/Goals, Treatment Preferences and Shares the Quality Data Associated with the Providers?: Yes  [place] Resource Information Provided?: No  Discharge Location  Discharge Placement: Home with home health

## 2021-03-31 NOTE — PROGRESS NOTES
Assumed care of pt at this time. Assessment complete. Pt alert and oriented x 4. Showing no signs of distress. Denies SOB and chest pain. Pt lungs clear bilaterally. Cap refill  less than 3 seconds. Pt has tingling to BLE. Stated pain 2 /10. Pt has 18G IV to R hand. Dressing to R knee CDI plexis applied to bilaterally. Teds to LLE. Pt encouraged to continue use of IS. Pt verbalized understanding. Ice pack in place. Fall risk armband intact. Call light and possessions within reach. Bed in low position with wheels locked. Will continue to monitor. 1600-Nurse notified by PT that patient looked diaphoretic and pale. BP on monitor was  75/46.     1605-Nurse did a manual BP and got 100/70.     1610-Nurse rechecked BP on monitor and it was 102/60. O2 stat was 88 nurse applied 2 liters and it was 99%. Pt is complaining of a  cramping pain. 1625-Nurse talked to Dr. Lord Mckinney and he wants to do a liter bolus of LR.     1730-Nurse bladder scanned patient. Pt retaining 800ml. 1743-Nurse talked to Dr. Lord Mckinney about above statement. Nurse to straight cath. 1800-Nurse straight cath patient at this time    1809-Nurse removed cath at this time. Nurse got out 850 of urine. Shift Summary-  Pt is alert and oriented x 4. Pt has not ambulated. Pt has not voided since straight cath at 1809. Pt has not had any PRN medication.

## 2021-03-31 NOTE — ROUTINE PROCESS
Bedside and Verbal shift change report given to Margarito Nieves (oncoming nurse) by China Rai RN (offgoing nurse). Report included the following information SBAR, Kardex, MAR and Recent Results.

## 2021-03-31 NOTE — ANESTHESIA PROCEDURE NOTES
Peripheral Block    Start time: 3/31/2021 9:42 AM  End time: 3/31/2021 9:44 AM  Performed by: Geovany Soares MD  Authorized by: Geovany Soares MD       Pre-procedure:    Indications: at surgeon's request and post-op pain management    Preanesthetic Checklist: patient identified, risks and benefits discussed, site marked, timeout performed, anesthesia consent given and patient being monitored    Timeout Time: 09:42          Block Type:   Block Type:  Ipack  Laterality:  Right  Monitoring:  Standard ASA monitoring, responsive to questions, oxygen, continuous pulse ox, frequent vital sign checks and heart rate  Injection Technique:  Single shot  Procedures: ultrasound guided    Patient Position: supine  Prep: chlorhexidine    Location:  Lower thigh  Needle Type:  Stimuplex  Needle Gauge:  21 G  Needle Localization:  Ultrasound guidance  Medication Injected:  Ropivacaine (PF) (NAROPIN)(0.5%) 5 mg/mL injection, 20 mL  Med Admin Time: 3/31/2021 9:44 AM    Assessment:  Number of attempts:  1  Injection Assessment:  Incremental injection every 5 mL, negative aspiration for CSF, ultrasound image on chart, no paresthesia, local visualized surrounding nerve on ultrasound, negative aspiration for blood and no intravascular symptoms  Patient tolerance:  Patient tolerated the procedure well with no immediate complications

## 2021-03-31 NOTE — PERIOP NOTES
Paged Dr. Uyen Salgado for patient sign out. Patient meets criteria for transfer to the next phase of care.

## 2021-03-31 NOTE — ANESTHESIA PREPROCEDURE EVALUATION
Relevant Problems   No relevant active problems       Anesthetic History     PONV          Review of Systems / Medical History  Patient summary reviewed, nursing notes reviewed and pertinent labs reviewed    Pulmonary            Asthma   Pertinent negatives: No COPD and sleep apnea     Neuro/Psych         Headaches (Migraines)  Pertinent negatives: No seizures and CVA   Cardiovascular      Valvular problems/murmurs (MVP)      Dysrhythmias     Pertinent negatives: No hypertension, past MI and CHF       GI/Hepatic/Renal                Endo/Other        Arthritis (Psoriatic and osteoarthritis)     Other Findings   Comments: Fibromyalgia           Physical Exam    Airway  Mallampati: I  TM Distance: 4 - 6 cm  Neck ROM: normal range of motion   Mouth opening: Normal     Cardiovascular    Rhythm: regular  Rate: normal         Dental  No notable dental hx       Pulmonary  Breath sounds clear to auscultation               Abdominal  GI exam deferred       Other Findings            Anesthetic Plan    ASA: 2  Anesthesia type: spinal      Post-op pain plan if not by surgeon: peripheral nerve block single    Induction: Intravenous  Anesthetic plan and risks discussed with: Patient

## 2021-03-31 NOTE — PROGRESS NOTES
OT NOTE:    Occupational therapy orders received. Chart review completed. OT & PT attempted to see pt in the afternoon and pt was very pale upon arrival. She reported that she was hot, nauseated, and felt like she was going to pass out. Pt's BP assessed in supine and it was 75/46 with automatic BP machine. RN notified and present in room to assess pt. OT evaluation deferred this date due to low BP. Will attempt to complete OT evaluation at next scheduled date as able.     Thank you for this referral.  Cherrie Mullen OTR/JORGE MOT

## 2021-03-31 NOTE — ROUTINE PROCESS
TRANSFER - IN REPORT:    Verbal report received from [employer] (name) on Valentina Senters  being received from PACU(unit) for routine post - op      Report consisted of patients Situation, Background, Assessment and   Recommendations(SBAR). Information from the following report(s) SBAR, Kardex, [facility] and Recent Results was reviewed with the receiving nurse. Opportunity for questions and clarification was provided. Assessment completed upon patients arrival to unit and care assumed.

## 2021-03-31 NOTE — PROGRESS NOTES
Physical Therapy Evaluation/Treatment Attempt     Chart reviewed. Attempted Physical Therapy Evaluation, however, patient unable to be seen due to:  []  Nausea/vomiting  []  Eating  []  Pain  []  Patient too lethargic  []  Off Unit for testing/procedure  []  Dialysis treatment in progress   []  Telemetry Results  [x]  Other: Attempted evaluation with OT, pt pale upon arrival, reported feeling hot, nauseated, and thought she \"would pass out. \" BRENDAN Chung notified and arrived in room to assess pt. BP with automatic cuff 75/46. Will hold PT evaluation for today d/y symptomatic low BP. Will follow up tomorrow as patient's schedule allows.    Thank you for this referral.    Mauricio Monae, PT, DPT

## 2021-03-31 NOTE — PERIOP NOTES
Reviewed PTA medication list with patient/caregiver and patient/caregiver denies any additional medications. Patient admits to having a responsible adult care for them at home for at least 24 hours after surgery. Patient encouraged to use gown warming system and informed that using said warming gown to regulate body temperature prior to a procedure has been shown to help reduce the risks of blood clots and infection. Patient's pharmacy of choice verified and documented in PTA medication section. Dual skin assessment & fall risk band verification completed with Katarzyna CARDONA. Urine pregnancy results negative and verified with Silvana Lombard.

## 2021-03-31 NOTE — PERIOP NOTES
TRANSFER - OUT REPORT:    Verbal report given to Mikhail Castrejon RN(name) on Anny Romana  being transferred to [facility](unit) for routine post - op       Report consisted of patients Situation, Background, Assessment and   Recommendations(SBAR). Information from the following report(s) SBAR, Kardex, Intake/Output and Cardiac Rhythm NSR was reviewed with the receiving nurse. Lines:   Peripheral IV 03/31/21 Right Hand (Active)   Site Assessment Clean, dry, & intact 03/31/21 1245   Phlebitis Assessment 0 03/31/21 1245   Infiltration Assessment 0 03/31/21 1245   Dressing Status Clean, dry, & intact 03/31/21 1245   Dressing Type Transparent;Tape 03/31/21 1245   Hub Color/Line Status Green; Infusing;Patent 03/31/21 1245        Opportunity for questions and clarification was provided.       Patient transported with:   O2 @ 2 liters  Registered Nurse  [facility]

## 2021-04-01 VITALS
OXYGEN SATURATION: 100 % | WEIGHT: 241.38 LBS | TEMPERATURE: 98.2 F | HEART RATE: 80 BPM | BODY MASS INDEX: 38.79 KG/M2 | DIASTOLIC BLOOD PRESSURE: 70 MMHG | RESPIRATION RATE: 16 BRPM | SYSTOLIC BLOOD PRESSURE: 115 MMHG | HEIGHT: 66 IN

## 2021-04-01 LAB — GLUCOSE BLD STRIP.AUTO-MCNC: 144 MG/DL (ref 70–110)

## 2021-04-01 PROCEDURE — 97530 THERAPEUTIC ACTIVITIES: CPT

## 2021-04-01 PROCEDURE — 97165 OT EVAL LOW COMPLEX 30 MIN: CPT

## 2021-04-01 PROCEDURE — 99218 HC RM OBSERVATION: CPT

## 2021-04-01 PROCEDURE — 97535 SELF CARE MNGMENT TRAINING: CPT

## 2021-04-01 PROCEDURE — 82962 GLUCOSE BLOOD TEST: CPT

## 2021-04-01 PROCEDURE — 74011000250 HC RX REV CODE- 250: Performed by: PHYSICIAN ASSISTANT

## 2021-04-01 PROCEDURE — 74011250637 HC RX REV CODE- 250/637: Performed by: PHYSICIAN ASSISTANT

## 2021-04-01 PROCEDURE — 97116 GAIT TRAINING THERAPY: CPT

## 2021-04-01 PROCEDURE — 74011250636 HC RX REV CODE- 250/636: Performed by: PHYSICIAN ASSISTANT

## 2021-04-01 PROCEDURE — 97161 PT EVAL LOW COMPLEX 20 MIN: CPT

## 2021-04-01 RX ORDER — OXYCODONE AND ACETAMINOPHEN 5; 325 MG/1; MG/1
1-2 TABLET ORAL
Qty: 56 TAB | Refills: 0 | Status: SHIPPED | OUTPATIENT
Start: 2021-04-01 | End: 2021-05-01

## 2021-04-01 RX ORDER — GUAIFENESIN 100 MG/5ML
81 LIQUID (ML) ORAL 2 TIMES DAILY
Qty: 84 TAB | Refills: 0 | Status: SHIPPED | OUTPATIENT
Start: 2021-04-01 | End: 2022-06-13

## 2021-04-01 RX ADMIN — ACETAMINOPHEN 1000 MG: 500 TABLET, FILM COATED ORAL at 02:47

## 2021-04-01 RX ADMIN — OXYCODONE 10 MG: 5 TABLET ORAL at 12:42

## 2021-04-01 RX ADMIN — FERROUS SULFATE TAB 325 MG (65 MG ELEMENTAL FE) 325 MG: 325 (65 FE) TAB at 08:28

## 2021-04-01 RX ADMIN — SENNOSIDES AND DOCUSATE SODIUM 1 TABLET: 8.6; 5 TABLET ORAL at 08:29

## 2021-04-01 RX ADMIN — KETOROLAC TROMETHAMINE 15 MG: 30 INJECTION, SOLUTION INTRAMUSCULAR at 02:47

## 2021-04-01 RX ADMIN — KETOROLAC TROMETHAMINE 15 MG: 30 INJECTION, SOLUTION INTRAMUSCULAR at 06:54

## 2021-04-01 RX ADMIN — DULOXETINE HYDROCHLORIDE 60 MG: 60 CAPSULE, DELAYED RELEASE ORAL at 08:29

## 2021-04-01 RX ADMIN — PREGABALIN 150 MG: 75 CAPSULE ORAL at 08:28

## 2021-04-01 RX ADMIN — CEFAZOLIN SODIUM 2 G: 100 INJECTION, POWDER, LYOPHILIZED, FOR SOLUTION INTRAVENOUS at 03:13

## 2021-04-01 RX ADMIN — ASPIRIN 81 MG: 81 TABLET, COATED ORAL at 08:29

## 2021-04-01 NOTE — PROGRESS NOTES
Problem: Falls - Risk of  Goal: *Absence of Falls  Description: Document Vincent Syed Fall Risk and appropriate interventions in the flowsheet.   4/1/2021 0122 by Ezra Yu RN  Outcome: Progressing Towards Goal  Note: Fall Risk Interventions:  Mobility Interventions: Patient to call before getting OOB, PT Consult for mobility concerns, PT Consult for assist device competence, Utilize walker, cane, or other assistive device         Medication Interventions: Patient to call before getting OOB, Teach patient to arise slowly    Elimination Interventions: Call light in reach, Patient to call for help with toileting needs           4/1/2021 0121 by Ezra Yu RN  Outcome: Progressing Towards Goal  Note: Fall Risk Interventions:  Mobility Interventions: Patient to call before getting OOB, PT Consult for mobility concerns, PT Consult for assist device competence, Utilize walker, cane, or other assistive device         Medication Interventions: Patient to call before getting OOB, Teach patient to arise slowly    Elimination Interventions: Call light in reach, Patient to call for help with toileting needs

## 2021-04-01 NOTE — PROGRESS NOTES
Problem: Mobility Impaired (Adult and Pediatric)  Goal: *Acute Goals and Plan of Care (Insert Text)  Description: PT goals to be met in 1 day:  Pt will be able to perform supine<>sit SBA for transfers at home. Pt will be able to perform sit<>stand SBA for increased ability to transfer at home safely. Pt will be able to participate in gt training >100' w/ RW, WBAT, GB and CGA/SBA for improved ability in home upon d/c. Pt will be able to perform stair training step to pattern, B/U rail and CGA to obtain safe entry into home upon d/c. Pt will be educated regarding HEP per MD protocol for optimal AROM/strength outcomes. Note: [x]  Patient has met MD mobilization critieria for d/c home   [x]  Recommend HH with 24 hour adult care   []  Benefit from additional acute PT session to address:      PHYSICAL THERAPY EVALUATION    Patient: Reid Waterman (28 y.o. female)  Date: 4/1/2021  Primary Diagnosis: History of total knee arthroplasty, right [Z96.651]  Procedure(s) (LRB):  RIGHT TOTAL KNEE ARTHROPLASTY [facility] AND [facility]), \"SPEC POP\" (Right) 1 Day Post-Op   Precautions:   Fall, WBAT    PLOF: Independent functional mobility w/o use of AD; had foot surgery on L recently    ASSESSMENT :  Based on the objective data described below, the patient presents with decreased mobility in regards to bed mobility, transfers, gt quality and tolerance, balance, stair negotiation and safety due to R TKA surgery. Decreased AROM of R knee, dec strength of R knee, pain in R knee, dec sensation of R knee also impacting pt functional mobility. Pt rating pain on numerical pain scale pre/post and during session 2/10. Pt ed regarding mobility safety, WB, HEP, ice application/use, elevation, environmental safety and home safe techniques. Pt able to perform supine<>sit w/ S and sit<>stand w/ S. Safety vc required throughout session to reinforce safety.   Pt able to participate in gt training using RW, GB, WBAT and SBA w/ antalgic gt pattern. Pt was able to participate in stair training using step to pattern, B rails and CGA. Answered questions by pt in regards to PT and mobility. Pt left supine in bed w/ all needs within reach, SCD B reapplied and ice pack to R knee. Nurse Cheyenne aware of session and outcomes. Recommend HHPT with responsible adult care at least 24 hours upon hospital d/c. Patient will benefit from skilled intervention to address the above impairments. Patient's rehabilitation potential is considered to be Good  Factors which may influence rehabilitation potential include:   []         None noted  []         Mental ability/status  []         Medical condition  []         Home/family situation and support systems  []         Safety awareness  [x]         Pain tolerance/management  []         Other:      PLAN :  Recommendations and Planned Interventions:   [x]           Bed Mobility Training             []    Neuromuscular Re-Education  [x]           Transfer Training                   []    Orthotic/Prosthetic Training  [x]           Gait Training                          [x]    Modalities  [x]           Therapeutic Exercises           [x]    Edema Management/Control  [x]           Therapeutic Activities            [x]    Family Training/Education  [x]           Patient Education  []           Other (comment):    Frequency/Duration: Patient will be followed by physical therapy twice daily to address goals. Discharge Recommendations: Home Health  Further Equipment Recommendations for Discharge: N/A     SUBJECTIVE:   Patient stated I had foot surgery and was on a boot for this left foot so this leg isn't strong.     OBJECTIVE DATA SUMMARY:     Past Medical History:   Diagnosis Date    Allergic rhinitis     Arthritis     osteoarthirtis    Asthma     Autoimmune disease ([facility] [address]     Psoriatic arthritis     Autoimmune disease (HCC)     fibromalgia    Fibromyalgia     GERD (gastroesophageal reflux disease)     Ill-defined condition     Mitral valve prolapse    Migraine     pt denies 2021    MVP (mitral valve prolapse)     Nausea & vomiting     PVC (premature ventricular contraction)      Past Surgical History:   Procedure Laterality Date    COLONOSCOPY N/A 10/30/2019    COLONOSCOPY, POLYPECTOMY performed by Vaishali Gonzalez MD at [address] [place] [address]  2014    L4L5S1 fusion    HX BREAST BIOPSY Bilateral     HX [address]    HX [address]      and endometrial ablation    HX LAP CHOLECYSTECTOMY  2017    HX ORTHOPAEDIC Bilateral     foot surgery    HX ORTHOPAEDIC Left 06/2017    bunionectomy     Barriers to Learning/Limitations: yes;  physical  Compensate with: Visual Cues, Verbal Cues, and Tactile Cues  Home Situation:  Home Situation  Home Environment: Private residence  # Steps to Enter: 5  Rails to Enter: Yes  Hand Rails : Bilateral  One/Two Story Residence: Two story, live on 1st floor  Living Alone: No  Support Systems: Spouse/Significant Other/Partner, Family member(s), Child(ruth)  Patient Expects to be Discharged to[de-identified] Private residence  Current DME Used/Available at Home: Walker, rolling  Tub or Shower Type: Tub/Shower combination  Critical Behavior:  Neurologic State: Alert; Appropriate for age  Orientation Level: Oriented X4  Cognition: Appropriate decision making; Appropriate for age attention/concentration; Appropriate safety awareness; Follows commands  Safety/Judgement: Awareness of environment  Psychosocial  Patient Behaviors: Calm; Cooperative  Skin Condition/Temp: Dry;Warm  Skin Integrity: Incision (comment)(R knee)  Skin Integumentary  Skin Color: Appropriate for ethnicity  Skin Condition/Temp: Dry;Warm  Skin Integrity: Incision (comment)(R knee)  Turgor: Non-tenting  Strength:    Strength: Generally decreased, functional  Tone & Sensation:   Tone: Normal  Sensation: Impaired(R knee)  Range Of Motion:  AROM: Generally decreased, functional  Functional Mobility:  Bed Mobility:  Supine to Sit: Supervision  Sit to Supine: Supervision  Scooting: Supervision  Transfers:  Sit to Stand: Supervision(vc)  Stand to Sit: Supervision(vc)  Balance:   Sitting: Intact  Standing: Intact; With support  Ambulation/Gait Training:  Distance (ft): 150 Feet (ft)  Assistive Device: Walker, rolling;Gait belt  Ambulation - Level of Assistance: Stand-by assistance(vc)  Gait Abnormalities: Antalgic;Decreased step clearance; Step to gait  Right Side Weight Bearing: As tolerated  Base of Support: Shift to left  Stance: Right decreased  Speed/Nuris: Slow  Step Length: Left shortened;Right shortened  Swing Pattern: Left asymmetrical;Right asymmetrical  Interventions: Safety awareness training; Tactile cues; Verbal cues; Visual/Demos  Stairs:  Number of Stairs Trained: 10  Stairs - Level of Assistance: Contact guard assistance(vc)  Rail Use: Left      Therapeutic Exercises:   Encouraged HEP  Pain:  Pain level pre-treatment: 2/10   Pain level post-treatment: 2/10   Pain Intervention(s) : Medication (see MAR); Rest, Ice, Repositioning  Response to intervention: Nurse notified, See doc flow    Activity Tolerance:   Fair   Please refer to the flowsheet for vital signs taken during this treatment. After treatment:   []         Patient left in no apparent distress sitting up in chair  [x]         Patient left in no apparent distress in bed  [x]         Call bell left within reach  [x]         Nursing notified  []         Caregiver present  []         Bed alarm activated  [x]         SCDs applied    COMMUNICATION/EDUCATION:   [x]         Role of Physical Therapy in the acute care setting. [x]         Fall prevention education was provided and the patient/caregiver indicated understanding. [x]         Patient/family have participated as able in goal setting and plan of care. [x]         Patient/family agree to work toward stated goals and plan of care.   []         Patient understands intent and goals of therapy, but is neutral about his/her participation. []         Patient is unable to participate in goal setting/plan of care: ongoing with therapy staff.  []         Other:     Thank you for this referral.  Calderon Montes, PT   Time Calculation: 25 mins      Eval Complexity: History: HIGH Complexity :3+ comorbidities / personal factors will impact the outcome/ POC Exam:MEDIUM Complexity : 3 Standardized tests and measures addressing body structure, function, activity limitation and / or participation in recreation  Presentation: LOW Complexity : Stable, uncomplicated  Clinical Decision Making:Low Complexity    Overall Complexity:LOW

## 2021-04-01 NOTE — NURSE NAVIGATOR
Srinivasa Guillen Rounded on post total knee replacement. Patient educated: Activity:   OOB for all meals,   Walk 3-4 times each day to promote circulation, help move better and lessen stiffness. Do not put anything under knee. Promoting circulation  Ankle pumps 10 times an hour at hospital & home. Take medications as prescribed by provider. Pain Control:  Pain medications side effects of constipation, nausea, dizziness, itching reviewed. Reminded patient swelling, bruising and increased pain are normal at home. To help decrease swelling after surgery it is safe to lie down and elevate legs above heart to help decrease swelling. Use pillows while keeping the surgical knee straight when elevating. Use ice, distraction, moving, & change position to help with pain. Rest between activity. Educated that medications can cause nausea and decreased appetite so eat a snack before taking medication. Narcotics cause constipation so take stool softener/mild laxative daily while on narcotics. Incentive Spirometry:    Use of incentive spirometer 10 x/hr. Diet:   Eat for healing. Drink plenty of fluids so urine is lemonade in color. Patient Safety:   Call light & belongings in reach. Call for help when want to walk or get OOB. Srinivasa Guillen verbalized understand. Given the opportunity for asking questions.       Orthopaedic Navigator

## 2021-04-01 NOTE — PROGRESS NOTES
9901  Assumed care of pt at this time. Assessment complete. Pt alert and oriented x 4 siting up to edge of bed for breakfast. Shows no sign of distress. Fall risk arm band in place. Denies SOB and chest pain. Pt lungs clear bilaterally. Cap refill  less than 3 seconds. Pt denies numbness and tingling to all extremities. Stated pain 2/10. Pt has 22 G IV to L AC. Pt has optifoam mepilex dressing to right knee CDI . Plexis and TEDs in place to BLE. Incentive spirometer at bedside. Pt encouraged to continue use of IS. Pt verbalized understanding. Ice pack applied. Call light and possessions within reach. Bed locked and in low position. Will continue to monitor. 1100  Pt ambulating in hallway with PT    1120  Pt passed PT at this time    1134  Dual AVS reviewed with ALIN lara rn. All medications reviewed individually with patient. Opportunities for questions and concerns provided. Patient to be discharged via (mode of transport ie. Car, ambulance or air transport) car. Patient's arm band appropriately discarded. IV removed    8371  Dual AVS reviewed with ALIN lara rn. All medications reviewed individually with patient. Opportunities for questions and concerns provided. Patient to be discharged via (mode of transport ie. Car, ambulance or air transport) car. Patient's arm band appropriately discarded.       Pt will stay to eat lunch first

## 2021-04-01 NOTE — DISCHARGE INSTRUCTIONS
[facility]     Patient Discharge Instructions    Naheed Lopez / [id number] : 1970    Admitted 3/31/2021 Discharged: 2021     IF YOU HAVE ANY PROBLEMS ONCE YOU ARE AT HOME CALL THE FOLLOWING NUMBERS:   Main office number: [phone]    Your follow up appointment to see either Dr. Elie Phoenix is scheduled in 1-2 weeks. If you are unsure of your appointment date call the office at [phone]. Take Home Medications     · Resume your home medictions as directed  · A prescription for pain medication has been given   · It is important that you take the medication exactly as they are prescribed. · Keep your medication in the bottles provided by the pharmacist and keep a list of the medication names, dosages, and times to be taken in your wallet. · Do not take other medications without consulting your doctor. · Note:  If you have already received and/or filled a prescription for one or more of the medications you've received a prescription for when leaving the hospital, you may disguard the duplicate prescription. What to do at [address] [place] [address] Ave your prehospital diet. If you have excessive nausea or vomitting call your doctor's office     Wear portable sequential compressive devices at least 18 hours per day for 2 weeks. They may be used beyond 2 weeks as needed to help control swelling. ARIANNA hose should be worn during the day - may be removed for sleep. Should be worn on both legs for 2 weeks and then on the operative extremity for a total of 6 weeks. Begin In-Home Physical Therapy; 3 times a week to work on gait training, range of motion, strengthening, and weight bearing exercises as tolerable. Continue to use your walker or cane when walking. May progress from the walker to a cane to complete total bearing as tolerable. Keep incision DRY. You may need to sponge bathe to keep the incision dry.     When to Call    - Call if you have a temperature greater then 101  - Unable to keep food down  - Are unable to bear any wieght   - Need a pain medication refill     Information obtained by :  I understand that if any problems occur once I am at home I am to contact my physician. I understand and acknowledge receipt of the instructions indicated above.                                                                                                                                            Physician's or R.N.'s Signature                                                                  Date/Time                                                                                                                                              Patient or Representative Signature                                                          Date/Time

## 2021-04-01 NOTE — DISCHARGE SUMMARY
[address] [facility] [place] [id number]   [facility] [address]     DISCHARGE SUMMARY     PATIENT: Mauri Tavarez     MRN: [id number]   ADMIT DATE: 3/31/2021   BILLIN   DISCHARGE DATE:  21     ATTENDING: Burnice Landau, MD   DICTATING: Brunilda Waldrop PA-C     ADMISSION DIAGNOSIS: History of total knee arthroplasty, right [Z96.651]    DISCHARGE DIAGNOSIS: Status post OSTEOARTHRITIS RIGHT KNEE    HISTORY OF PRESENT ILLNESS: The patient is a 46y.o. year-old female   with ongoing right knee pain secondary to osteoarthritis of her right knee. The patient's pain has persisted and progressed despite conservative treatments and therapies. The patient has at this time opted for surgical intervention.     PAST MEDICAL HISTORY:   Past Medical History:   Diagnosis Date    Allergic rhinitis     Arthritis     osteoarthirtis    Asthma     Autoimmune disease ([facility] [address]     Psoriatic arthritis     Autoimmune disease ([facility] [address]     fibromalgia    Fibromyalgia     GERD (gastroesophageal reflux disease)     Ill-defined condition     Mitral valve prolapse    Migraine     pt denies     MVP (mitral valve prolapse)     Nausea & vomiting     PVC (premature ventricular contraction)        PAST SURGICAL HISTORY:   Past Surgical History:   Procedure Laterality Date    COLONOSCOPY N/A 10/30/2019    COLONOSCOPY, POLYPECTOMY performed by Everton Vines MD at [address] [place] [address]      L4L5S1 fusion    HX BREAST BIOPSY Bilateral     HX [facility] [place] &     HX DILATION AND CURETTAGE      and endometrial ablation    HX LAP CHOLECYSTECTOMY  2017    HX ORTHOPAEDIC Bilateral     foot surgery    HX ORTHOPAEDIC Left 2017    bunionectomy       ALLERGIES:   Allergies   Allergen Reactions    Adhesive Tape-Silicones Other (comments)     Blisters  Patient reports she can tolerate paper tape    Tramadol Other (comments)     hallucinations        CURRENT MEDICATIONS:  A list of medications prior to the time of admission include:  Prior to Admission medications    Medication Sig Start Date End Date Taking? Authorizing Provider   docusate sodium (COLACE) 50 mg capsule Take 2 Caps by mouth three (3) times daily as needed for Constipation for up to 90 days. 4/1/21 6/30/21 Yes Kristopher Lopes PA-C   oxyCODONE-acetaminophen (PERCOCET) 5-325 mg per tablet Take 1-2 Tabs by mouth every four (4) hours as needed for Pain for up to 30 days. Max Daily Amount: 12 Tabs. 4/1/21 5/1/21 Yes Kristopher Lopes PA-C   aspirin 81 mg chewable tablet Take 1 Tab by mouth two (2) times a day. 4/1/21  Yes Kristopher Lopes PA-C   metoprolol succinate (TOPROL XL PO) Take 150 mg by mouth two (2) times a day. Yes Provider, Historical   omeprazole (PRILOSEC) 20 mg capsule Take 20 mg by mouth nightly. Yes Provider, Historical   DULoxetine (CYMBALTA) 30 mg capsule Take 60 mg by mouth daily. Indications: FIBROMYALGIA 10/8/15  Yes Provider, Historical   pregabalin (LYRICA) 150 mg capsule Take 1 Cap by mouth two (2) times a day. Indications: disorder characterized by stiff, tender & painful muscles 4/4/16  Yes Provider, Historical   multivitamin (DAILY VALUE) tablet Take 1 Tab by mouth daily. 3/24/14  Yes Provider, Historical   folic acid (FOLVITE) 1 mg tablet Take 1 mg by mouth daily. Yes Provider, Historical   albuterol (PROAIR HFA) 90 mcg/actuation inhaler Take 2 Puffs by inhalation every four (4) hours as needed for Wheezing. Provider, Historical   diclofenac EC (VOLTAREN) 75 mg EC tablet Take 75 mg by mouth two (2) times a day. Provider, Historical   methotrexate (RHEUMATREX) 2.5 mg tablet Take 12.5 mg by mouth. 2 times every Friday    Provider, Historical   sulfaSALAzine (AZULFIDINE) 500 mg tablet Take 1,000 mg by mouth two (2) times a day. After meals    Provider, Historical       FAMILY HISTORY: History reviewed. No pertinent family history.     SOCIAL HISTORY:   Social History     Socioeconomic History    Marital status:      Spouse name: Not on file    Number of children: Not on file    Years of education: Not on file    Highest education level: Not on file   Tobacco Use    Smoking status: Never Smoker    Smokeless tobacco: Never Used   Substance and Sexual Activity    Alcohol use: Yes     Comment: 1 glass of wine every 6 months    Drug use: Not Currently    Sexual activity: Yes       REVIEW OF SYSTEMS: All review of systems are negative. PHYSICAL EXAMINATION: For a detailed physical exam, please refer to the patient's chart. HOSPITAL COURSE: The patient was taken to surgery the day of admission. she underwent a right total knee replacement. Operative course was benign. Estimated blood loss was approximately 100 cc. The patient was taken to the PACU in stable condition and was later taken to the floor in stable condition. During her hospital stay, the patient progressed well with physical therapy and occupational therapy, adherent to instructions. she had been cleared by physical therapy with stair training. she was placed on Aspirin for DVT prophylaxis. her pain has been well controlled with oral pain medications. her vitals have remained stable. she has also remained hemodynamically stable. The patient has been recommended for discharge home on POD#1. DISCHARGE INSTRUCTIONS: The patient is to be discharged home with Home Health. she is to continue on her prior medications per the medication reconciliation form, to which we will add:  1. Ecotrin 81 mg; 1 tablet p.o. Twice daily X 6 weeks  2. Colace 100 mg po TID prn constipation  3. Percocet 5/325 mg; 1-2 tablets p.o. every 4 to 6 hours p.r.n. for pain    The patient is to continue at home with home physical therapy 3 times a week to work on gait training, range of motion, strengthening, and weightbearing exercises as tolerated on her right lower extremity.   The patient is to progress from a walker to a cane to complete total weightbearing as tolerable. The patient is to continue to keep her incision dry. The patient is to followup with Dr. Elie Phoenix in the office approximately 1-2 weeks status post for x-rays and further evaluation.     Thad Guillen PA-C  7/3/08540:83 AM

## 2021-04-01 NOTE — PROGRESS NOTES
1925: Bedside report received from [facility], RN. Assumed care of pt at this time. Pt in bed with no signs of distress. Pt left with call light within reach and encouraged to call for assistance. 2020: Patient was straight cathed in previous shift. Ambulated to bathroom with walker for the first time post surgery. Some urine on ace wrap dressing -- removed and placed ARIANNA hose on right leg. Voided 500ml naturally. 2032: Head to toe assessment completed at this time  Patient is A&OX4. Pt denies N/V, chest pain, SOB or distress. Pt is calm and cooperative. Capillary refill less than 3 seconds. Skin is warm and dry with optifoam dressing on right knee and is clean, dry and intact. Lungs are clear bilaterally. Patient instructed on use and reason for incentive spirometer and able to do 1750 ml. Bowel sounds are active. Abdomen is soft and non-tender. Plexi & Teds in place bilaterally. Positive dorsalis pedis pulse, sensation, warm. No tingling or numbness to lower extremities. Pain scale explained to patient. Reasons for taking PRN meds explained to patient. Patient instructed to call for prn when needed. Pain level is 7. 10mg of oxycodone given PRN. Patient was oriented to call bell and bed function. Shift summary: Patient had uneventful shift. Patient ambulated with assistance using walker. Pain remained well-controlled with medication. Voiding without issue. No issues/concerns at this time.

## 2021-04-01 NOTE — PROGRESS NOTES
Problem: Self Care Deficits Care Plan (Adult)  Goal: *Acute Goals and Plan of Care (Insert Text)  Outcome: Resolved/Met     Occupational Therapy EVALUATION/discharge    Patient: Kay Vazquez (68 y.o. female)  Date: 4/1/2021  Primary Diagnosis: History of total knee arthroplasty, right [Z96.651]  Procedure(s) (LRB):  RIGHT TOTAL KNEE ARTHROPLASTY [facility] AND NEPHEW), \"SPEC POP\" (Right) 1 Day Post-Op   Precautions:  Fall, WBAT    ASSESSMENT AND RECOMMENDATIONS:  Based on the objective data described below, the patient presents with right TKA. Pt completed bed mobility and transfers with supervision. Functional mobility with supervision using RW. Pt completed UB dressing with supervision and LB dressing with CGA with assist for socks. Pt has family at home to assist as needed. Pt education on home safety, fall prevention and ADL technique and demonstrated understanding through verbal feedback. No further skilled acute OT needs indicated at this time. Will sign off. Skilled occupational therapy is not indicated at this time. Discharge Recommendations: Home Health  Further Equipment Recommendations for Discharge: N/A      SUBJECTIVE:   Patient stated My left foot has some weakness too.     OBJECTIVE DATA SUMMARY:     Past Medical History:   Diagnosis Date    Allergic rhinitis     Arthritis     osteoarthirtis    Asthma     Autoimmune disease ([facility] [address]     Psoriatic arthritis     Autoimmune disease ([facility] [address]     fibromalgia    Fibromyalgia     GERD (gastroesophageal reflux disease)     Ill-defined condition     Mitral valve prolapse    Migraine     pt denies 2021    MVP (mitral valve prolapse)     Nausea & vomiting     PVC (premature ventricular contraction)      Past Surgical History:   Procedure Laterality Date    COLONOSCOPY N/A 10/30/2019    COLONOSCOPY, POLYPECTOMY performed by Deshawn Schmitz MD at [address] [place] [address]  2014    L4L5S1 fusion    HX BREAST BIOPSY Bilateral     [address] & 2000    HX DILATION AND CURETTAGE      and endometrial ablation    HX LAP CHOLECYSTECTOMY  2017    HX ORTHOPAEDIC Bilateral     foot surgery    HX ORTHOPAEDIC Left 06/2017    bunionectomy     Barriers to Learning/Limitations: None  Compensate with: visual, verbal, tactile, kinesthetic cues/model  Prior Level of Function/Home Situation: I with ADLs and mobility prior  Home Situation  Home Environment: Private residence  One/Two Story Residence: Two story, live on 1st floor  Living Alone: No  Support Systems: Spouse/Significant Other/Partner  Patient Expects to be Discharged to[de-identified] Private residence  Current DME Used/Available at Home: Walker, rolling  Tub or Shower Type: Tub/Shower combination  []     Right hand dominant   []     Left hand dominant  Cognitive/Behavioral Status:  Neurologic State: Alert; Appropriate for age  Safety/Judgement: Awareness of environment; Fall prevention  Skin: incision on right LE covered with dressing  Edema: min edema noted on right LE  Vision/Perceptual:    Corrective Lenses: Reading glasses  Coordination:  Coordination: Within functional limits  Fine Motor Skills-Upper: Left Intact; Right Intact    Gross Motor Skills-Upper: Left Intact; Right Intact  Balance:  Sitting: Intact  Standing: Intact; With support  Strength:  Strength: Within functional limits  Tone & Sensation:  Tone: Normal  Sensation: Intact  Range of Motion:  AROM: Within functional limits  Functional Mobility and Transfers for ADLs:  Bed Mobility:   Supine to Sit: Supervision  Sit to Supine: Supervision  Scooting: Supervision  Transfers:  Sit to Stand: Supervision   Toilet Transfer : Supervision  ADL Assessment:  Upper Body Dressing: Supervision  Lower Body Dressing: Contact guard assistance  Toileting: Stand by assistance  ADL Intervention:  Cognitive Retraining  Safety/Judgement: Awareness of environment; Fall prevention    Pain:  Pain Scale 1: Numeric (0 - 10)  Pain Intensity 1: 2  Pain Location 1: Knee  Pain Orientation 1: Right  Pain Description 1: Aching  Pain Intervention(s) 1: Declines  Activity Tolerance:   good  Please refer to the flowsheet for vital signs taken during this treatment. After treatment:   []  Patient left in no apparent distress sitting up in chair  [x]  Patient left in no apparent distress in bed  [x]  Call bell left within reach  [x]  Nursing notified  []  Caregiver present  []  Bed alarm activated    COMMUNICATION/EDUCATION:   Communication/Collaboration:  [x]      Home safety education was provided and the patient/caregiver indicated understanding. [x]      Patient/family have participated as able and agree with findings and recommendations. []      Patient is unable to participate in plan of care at this time.     Michael Wells OTR/L  Time Calculation: 41 mins

## 2021-04-05 ENCOUNTER — TELEPHONE (OUTPATIENT)
Dept: OTHER | Age: 51
End: 2021-04-05

## 2021-04-05 NOTE — TELEPHONE ENCOUNTER
Post Discharge Phone placed after Joint Replacement surgery   Spoke with [address]    Patient is taking the narcotic for pain as ordered. Has cut back to one pain pill during the day and two at night. States pain is tolerable and pain medication is effective. Patient was able to fill prescriptions, no medication questions        PT is scheduled. Patient is using a walker without difficulty, moving every hour  Able to do exercises as instructed by therapy. Swelling is manageable. Patient is elevating leg above heart level and using ice with good relief  Reviewed walking every hour while awake     Denies N/V, appetite is fair but eating. Reviewed the importance of eating  healthy to heal and have energy. Constipation is not a problem. Reviewed the importance of good nutrition to heal and have energy. Patient states dressing is intact without drainage.    Denies fever, cough, chest pain, SOB  Denies any unusual symptoms    Follow up appointment is scheduled with surgeon     States discharge instructions were clear and easy to understand has no questions    Discussed calling surgeon or PCP for concerns    Reminded patient to fill out survey    Opportunity given for patient to ask questions

## 2022-03-20 PROBLEM — Z96.651 HISTORY OF TOTAL KNEE ARTHROPLASTY, RIGHT: Status: ACTIVE | Noted: 2021-03-31

## 2022-06-09 ENCOUNTER — HOSPITAL ENCOUNTER (OUTPATIENT)
Dept: PREADMISSION TESTING | Age: 52
Discharge: HOME | End: 2022-06-09
Payer: COMMERCIAL

## 2022-06-09 LAB
ALBUMIN SERPL-MCNC: 3.6 G/DL (ref 3.4–5)
ALBUMIN/GLOB SERPL: 1.1 {RATIO} (ref 0.8–1.7)
ALP SERPL-CCNC: 91 U/L (ref 45–117)
ALT SERPL-CCNC: 33 U/L (ref 13–56)
ANION GAP SERPL CALC-SCNC: 2 MMOL/L (ref 3–18)
APPEARANCE UR: ABNORMAL
APTT PPP: 26.3 SEC (ref 23–36.4)
AST SERPL-CCNC: 21 U/L (ref 10–38)
BASOPHILS # BLD: 0 K/UL (ref 0–0.1)
BASOPHILS NFR BLD: 1 % (ref 0–2)
BILIRUB SERPL-MCNC: 0.2 MG/DL (ref 0.2–1)
BILIRUB UR QL: NEGATIVE
BUN SERPL-MCNC: 21 MG/DL (ref 7–18)
BUN/CREAT SERPL: 31 (ref 12–20)
CALCIUM SERPL-MCNC: 9.2 MG/DL (ref 8.5–10.1)
CHLORIDE SERPL-SCNC: 106 MMOL/L (ref 100–111)
CO2 SERPL-SCNC: 33 MMOL/L (ref 21–32)
COLOR UR: YELLOW
CREAT SERPL-MCNC: 0.67 MG/DL (ref 0.6–1.3)
DIFFERENTIAL METHOD BLD: ABNORMAL
EOSINOPHIL # BLD: 0.2 K/UL (ref 0–0.4)
EOSINOPHIL NFR BLD: 3 % (ref 0–5)
ERYTHROCYTE [DISTWIDTH] IN BLOOD BY AUTOMATED COUNT: 17.2 % (ref 11.6–14.5)
GLOBULIN SER CALC-MCNC: 3.2 G/DL (ref 2–4)
GLUCOSE SERPL-MCNC: 114 MG/DL (ref 74–99)
GLUCOSE UR STRIP.AUTO-MCNC: NEGATIVE MG/DL
HCT VFR BLD AUTO: 37.6 % (ref 35–45)
HGB BLD-MCNC: 11.3 G/DL (ref 12–16)
HGB UR QL STRIP: NEGATIVE
IMM GRANULOCYTES # BLD AUTO: 0 K/UL (ref 0–0.04)
IMM GRANULOCYTES NFR BLD AUTO: 0 % (ref 0–0.5)
INR PPP: 0.9 (ref 0.8–1.2)
KETONES UR QL STRIP.AUTO: ABNORMAL MG/DL
LEUKOCYTE ESTERASE UR QL STRIP.AUTO: NEGATIVE
LYMPHOCYTES # BLD: 1.6 K/UL (ref 0.9–3.6)
LYMPHOCYTES NFR BLD: 24 % (ref 21–52)
MCH RBC QN AUTO: 28 PG (ref 24–34)
MCHC RBC AUTO-ENTMCNC: 30.1 G/DL (ref 31–37)
MCV RBC AUTO: 93.3 FL (ref 78–100)
MONOCYTES # BLD: 0.6 K/UL (ref 0.05–1.2)
MONOCYTES NFR BLD: 10 % (ref 3–10)
NEUTS SEG # BLD: 4.2 K/UL (ref 1.8–8)
NEUTS SEG NFR BLD: 63 % (ref 40–73)
NITRITE UR QL STRIP.AUTO: NEGATIVE
NRBC # BLD: 0 K/UL (ref 0–0.01)
NRBC BLD-RTO: 0 PER 100 WBC
PH UR STRIP: 5.5 [PH] (ref 5–8)
PLATELET # BLD AUTO: 260 K/UL (ref 135–420)
PMV BLD AUTO: 10.5 FL (ref 9.2–11.8)
POTASSIUM SERPL-SCNC: 4.4 MMOL/L (ref 3.5–5.5)
PROT SERPL-MCNC: 6.8 G/DL (ref 6.4–8.2)
PROT UR STRIP-MCNC: NEGATIVE MG/DL
PROTHROMBIN TIME: 12.7 SEC (ref 11.5–15.2)
RBC # BLD AUTO: 4.03 M/UL (ref 4.2–5.3)
SODIUM SERPL-SCNC: 141 MMOL/L (ref 136–145)
SP GR UR REFRACTOMETRY: 1.03 (ref 1–1.03)
UROBILINOGEN UR QL STRIP.AUTO: 0.2 EU/DL (ref 0.2–1)
WBC # BLD AUTO: 6.6 K/UL (ref 4.6–13.2)

## 2022-06-09 PROCEDURE — 36415 COLL VENOUS BLD VENIPUNCTURE: CPT

## 2022-06-09 PROCEDURE — 85730 THROMBOPLASTIN TIME PARTIAL: CPT

## 2022-06-09 PROCEDURE — 80053 COMPREHEN METABOLIC PANEL: CPT

## 2022-06-09 PROCEDURE — 85610 PROTHROMBIN TIME: CPT

## 2022-06-09 PROCEDURE — 87086 URINE CULTURE/COLONY COUNT: CPT

## 2022-06-09 PROCEDURE — 81003 URINALYSIS AUTO W/O SCOPE: CPT

## 2022-06-09 PROCEDURE — 93005 ELECTROCARDIOGRAM TRACING: CPT

## 2022-06-09 PROCEDURE — 85025 COMPLETE CBC W/AUTO DIFF WBC: CPT

## 2022-06-10 LAB
ATRIAL RATE: 86 BPM
BACTERIA SPEC CULT: NORMAL
CALCULATED P AXIS, ECG09: 55 DEGREES
CALCULATED R AXIS, ECG10: 14 DEGREES
CALCULATED T AXIS, ECG11: 32 DEGREES
DIAGNOSIS, 93000: NORMAL
P-R INTERVAL, ECG05: 168 MS
Q-T INTERVAL, ECG07: 376 MS
QRS DURATION, ECG06: 84 MS
QTC CALCULATION (BEZET), ECG08: 449 MS
SERVICE CMNT-IMP: NORMAL
SERVICE CMNT-IMP: NORMAL
VENTRICULAR RATE, ECG03: 86 BPM

## 2022-06-13 ENCOUNTER — HOSPITAL ENCOUNTER (OUTPATIENT)
Dept: PREADMISSION TESTING | Age: 52
Discharge: HOME | End: 2022-06-13

## 2022-06-13 VITALS — HEIGHT: 67 IN | WEIGHT: 249 LBS | BODY MASS INDEX: 39.08 KG/M2

## 2022-06-13 RX ORDER — SULFASALAZINE 500 MG/1
500 TABLET ORAL 2 TIMES DAILY
COMMUNITY

## 2022-06-13 RX ORDER — SODIUM CHLORIDE, SODIUM LACTATE, POTASSIUM CHLORIDE, CALCIUM CHLORIDE 600; 310; 30; 20 MG/100ML; MG/100ML; MG/100ML; MG/100ML
125 INJECTION, SOLUTION INTRAVENOUS CONTINUOUS
Status: CANCELLED | OUTPATIENT
Start: 2022-06-13

## 2022-06-13 RX ORDER — CEFAZOLIN SODIUM/WATER 2 G/20 ML
2 SYRINGE (ML) INTRAVENOUS ONCE
Status: CANCELLED | OUTPATIENT
Start: 2022-06-13 | End: 2022-06-13

## 2022-06-13 RX ORDER — TIZANIDINE 4 MG/1
4 TABLET ORAL
COMMUNITY

## 2022-06-13 RX ORDER — MONTELUKAST SODIUM 10 MG/1
10 TABLET ORAL EVERY EVENING
COMMUNITY

## 2022-06-13 NOTE — PERIOP NOTES
PAT - SURGICAL PRE-ADMISSION INSTRUCTIONS    NAME:  James Garcia                                                          TODAY'S DATE:  6/13/2022    SURGERY DATE:  6/29/2022                                  SURGERY ARRIVAL TIME:   TBA    1. Do NOT eat or drink anything, including candy or gum, after MIDNIGHT on 6/29/2022 , unless you have specific instructions from your Surgeon or Anesthesia Provider to do so. 2. No smoking 24 hours before surgery. 3. No alcohol 24 hours prior to the day of surgery. 4. No recreational drugs for one week prior to the day of surgery. 5. Leave all valuables, including money/purse, at home. 6. Remove all jewelry, nail polish, makeup (including mascara); no lotions, powders, deodorant, or perfume/cologne/after shave. 7. Glasses/Contact lenses and Dentures may be worn to the hospital.  They will be removed prior to surgery. 8. Call your doctor if symptoms of a cold or illness develop within 24 ours prior to surgery. 9. AN ADULT MUST DRIVE YOU HOME AFTER OUTPATIENT SURGERY. 10. If you are having an OUTPATIENT procedure, please make arrangements for a responsible adult to be with you for 24 hours after your surgery. 11. If you are admitted to the hospital, you will be assigned to a bed after surgery is complete. Normally a family member will not be able to see you until you are in your assigned bed. 12. Visitation Restrictions Explained. Special Instructions:  Covid Test not needed patient vaccinated, Quarantine requirements discussed  No Advanced Directive or DNR   Bring inhaler DOS. Take following medications with sip of water duloxetine, metoprolol succinate.  Stop Diclofenac 7 days prior to DOS    Patient Prep:    use CHG solution     These surgical instructions were reviewed with patient during the PAT phone call

## 2022-06-21 NOTE — NURSE NAVIGATOR
Arlie Hashimoto watched the pre op seminar online and received a preoperative education booklet in anticipation of surgery    Nurse Navigator

## 2022-06-29 ENCOUNTER — ANESTHESIA EVENT (OUTPATIENT)
Dept: SURGERY | Age: 52
End: 2022-06-29
Payer: COMMERCIAL

## 2022-06-29 ENCOUNTER — APPOINTMENT (OUTPATIENT)
Dept: GENERAL RADIOLOGY | Age: 52
End: 2022-06-29
Attending: PHYSICIAN ASSISTANT
Payer: COMMERCIAL

## 2022-06-29 ENCOUNTER — ANESTHESIA (OUTPATIENT)
Dept: SURGERY | Age: 52
End: 2022-06-29
Payer: COMMERCIAL

## 2022-06-29 ENCOUNTER — HOSPITAL ENCOUNTER (OUTPATIENT)
Age: 52
Discharge: HOME HEALTH | End: 2022-06-30
Attending: ORTHOPAEDIC SURGERY | Admitting: ORTHOPAEDIC SURGERY
Payer: COMMERCIAL

## 2022-06-29 PROBLEM — Z96.652 TOTAL KNEE REPLACEMENT STATUS, LEFT: Status: ACTIVE | Noted: 2022-06-29

## 2022-06-29 LAB
ABO + RH BLD: NORMAL
BLOOD GROUP ANTIBODIES SERPL: NORMAL
HCG UR QL: NEGATIVE
SPECIMEN EXP DATE BLD: NORMAL

## 2022-06-29 PROCEDURE — 97165 OT EVAL LOW COMPLEX 30 MIN: CPT

## 2022-06-29 PROCEDURE — 77030040361 HC SLV COMPR DVT MDII -B: Performed by: ORTHOPAEDIC SURGERY

## 2022-06-29 PROCEDURE — 86900 BLOOD TYPING SEROLOGIC ABO: CPT

## 2022-06-29 PROCEDURE — 81025 URINE PREGNANCY TEST: CPT

## 2022-06-29 PROCEDURE — 76210000006 HC OR PH I REC 0.5 TO 1 HR: Performed by: ORTHOPAEDIC SURGERY

## 2022-06-29 PROCEDURE — 97161 PT EVAL LOW COMPLEX 20 MIN: CPT

## 2022-06-29 PROCEDURE — 77030040815: Performed by: ORTHOPAEDIC SURGERY

## 2022-06-29 PROCEDURE — 77030038010: Performed by: ORTHOPAEDIC SURGERY

## 2022-06-29 PROCEDURE — 74011250636 HC RX REV CODE- 250/636: Performed by: ORTHOPAEDIC SURGERY

## 2022-06-29 PROCEDURE — 77030029099 HC BN WAX SSPC -A: Performed by: ORTHOPAEDIC SURGERY

## 2022-06-29 PROCEDURE — 97116 GAIT TRAINING THERAPY: CPT

## 2022-06-29 PROCEDURE — 97535 SELF CARE MNGMENT TRAINING: CPT

## 2022-06-29 PROCEDURE — 76942 ECHO GUIDE FOR BIOPSY: CPT | Performed by: ORTHOPAEDIC SURGERY

## 2022-06-29 PROCEDURE — 74011250636 HC RX REV CODE- 250/636: Performed by: NURSE ANESTHETIST, CERTIFIED REGISTERED

## 2022-06-29 PROCEDURE — 74011250636 HC RX REV CODE- 250/636: Performed by: PHYSICIAN ASSISTANT

## 2022-06-29 PROCEDURE — 74011000272 HC RX REV CODE- 272: Performed by: ORTHOPAEDIC SURGERY

## 2022-06-29 PROCEDURE — 77030020782 HC GWN BAIR PAWS FLX 3M -B: Performed by: ORTHOPAEDIC SURGERY

## 2022-06-29 PROCEDURE — 74011000250 HC RX REV CODE- 250: Performed by: PHYSICIAN ASSISTANT

## 2022-06-29 PROCEDURE — 77030020269 HC MISC IMPL: Performed by: ORTHOPAEDIC SURGERY

## 2022-06-29 PROCEDURE — 77030011628: Performed by: ORTHOPAEDIC SURGERY

## 2022-06-29 PROCEDURE — 64447 NJX AA&/STRD FEMORAL NRV IMG: CPT | Performed by: ORTHOPAEDIC SURGERY

## 2022-06-29 PROCEDURE — 97530 THERAPEUTIC ACTIVITIES: CPT

## 2022-06-29 PROCEDURE — C1713 ANCHOR/SCREW BN/BN,TIS/BN: HCPCS | Performed by: ORTHOPAEDIC SURGERY

## 2022-06-29 PROCEDURE — 77030031139 HC SUT VCRL2 J&J -A: Performed by: ORTHOPAEDIC SURGERY

## 2022-06-29 PROCEDURE — 74011250636 HC RX REV CODE- 250/636: Performed by: ANESTHESIOLOGY

## 2022-06-29 PROCEDURE — 36415 COLL VENOUS BLD VENIPUNCTURE: CPT

## 2022-06-29 PROCEDURE — C1776 JOINT DEVICE (IMPLANTABLE): HCPCS | Performed by: ORTHOPAEDIC SURGERY

## 2022-06-29 PROCEDURE — 77030000032 HC CUF TRNQT ZIMM -B: Performed by: ORTHOPAEDIC SURGERY

## 2022-06-29 PROCEDURE — 77030034479 HC ADH SKN CLSR PRINEO J&J -B: Performed by: ORTHOPAEDIC SURGERY

## 2022-06-29 PROCEDURE — 76060000035 HC ANESTHESIA 2 TO 2.5 HR: Performed by: ORTHOPAEDIC SURGERY

## 2022-06-29 PROCEDURE — 77030033067 HC SUT PDO STRATFX SPIR J&J -B: Performed by: ORTHOPAEDIC SURGERY

## 2022-06-29 PROCEDURE — 73560 X-RAY EXAM OF KNEE 1 OR 2: CPT

## 2022-06-29 PROCEDURE — 74011000258 HC RX REV CODE- 258: Performed by: ORTHOPAEDIC SURGERY

## 2022-06-29 PROCEDURE — 77030013079 HC BLNKT BAIR HGGR 3M -A: Performed by: ANESTHESIOLOGY

## 2022-06-29 PROCEDURE — 77010033678 HC OXYGEN DAILY

## 2022-06-29 PROCEDURE — 77030014144 HC TY SPN ANES BBMI -B: Performed by: ANESTHESIOLOGY

## 2022-06-29 PROCEDURE — 77030002933 HC SUT MCRYL J&J -A: Performed by: ORTHOPAEDIC SURGERY

## 2022-06-29 PROCEDURE — 74011000250 HC RX REV CODE- 250: Performed by: NURSE ANESTHETIST, CERTIFIED REGISTERED

## 2022-06-29 PROCEDURE — 74011000250 HC RX REV CODE- 250: Performed by: ANESTHESIOLOGY

## 2022-06-29 PROCEDURE — 77030013708 HC HNDPC SUC IRR PULS STRY –B: Performed by: ORTHOPAEDIC SURGERY

## 2022-06-29 PROCEDURE — 74011000250 HC RX REV CODE- 250: Performed by: ORTHOPAEDIC SURGERY

## 2022-06-29 PROCEDURE — 2709999900 HC NON-CHARGEABLE SUPPLY: Performed by: ORTHOPAEDIC SURGERY

## 2022-06-29 PROCEDURE — 76010000131 HC OR TIME 2 TO 2.5 HR: Performed by: ORTHOPAEDIC SURGERY

## 2022-06-29 PROCEDURE — C9290 INJ, BUPIVACAINE LIPOSOME: HCPCS | Performed by: ORTHOPAEDIC SURGERY

## 2022-06-29 PROCEDURE — 77030027138 HC INCENT SPIROMETER -A: Performed by: ORTHOPAEDIC SURGERY

## 2022-06-29 PROCEDURE — 77030011265 HC ELECTRD BLD HEX COVD -A: Performed by: ORTHOPAEDIC SURGERY

## 2022-06-29 PROCEDURE — 74011250637 HC RX REV CODE- 250/637: Performed by: PHYSICIAN ASSISTANT

## 2022-06-29 DEVICE — JOURNEY TIBIAL BASEPLATE NONPOROUS                                    LT SZ 2
Type: IMPLANTABLE DEVICE | Site: KNEE | Status: FUNCTIONAL
Brand: JOURNEY

## 2022-06-29 DEVICE — JOURNEY II BCS FEMORAL OXINIUM                                    LEFT SIZE 4
Type: IMPLANTABLE DEVICE | Site: KNEE | Status: FUNCTIONAL
Brand: JOURNEY

## 2022-06-29 DEVICE — GENESIS II OVAL RESURFACING                                    PATELLAR 29MM
Type: IMPLANTABLE DEVICE | Site: KNEE | Status: FUNCTIONAL
Brand: GENESIS II

## 2022-06-29 DEVICE — GENESIS II LONG STEM 10MM X 70MM
Type: IMPLANTABLE DEVICE | Site: KNEE | Status: FUNCTIONAL
Brand: GENESIS II

## 2022-06-29 DEVICE — SMARTSET HV HIGH VISCOSITY BONE CEMENT 40G
Type: IMPLANTABLE DEVICE | Site: KNEE | Status: FUNCTIONAL
Brand: SMARTSET

## 2022-06-29 DEVICE — KNEE K1 TOT HEMI STD CEM IMPL CAPPED K1 SN: Type: IMPLANTABLE DEVICE | Site: KNEE | Status: FUNCTIONAL

## 2022-06-29 DEVICE — JOURNEY II BCS CONSTRAINED                                    ARTICULAR INSERT SIZE 1-2 LEFT 9MM
Type: IMPLANTABLE DEVICE | Site: KNEE | Status: FUNCTIONAL
Brand: JOURNEY

## 2022-06-29 DEVICE — SMARTSET GHV GENTAMICIN HIGH VISCOSITY BONE CEMENT 40G
Type: IMPLANTABLE DEVICE | Site: KNEE | Status: FUNCTIONAL
Brand: SMARTSET

## 2022-06-29 RX ORDER — ACETAMINOPHEN 500 MG
1000 TABLET ORAL EVERY 8 HOURS
Status: DISCONTINUED | OUTPATIENT
Start: 2022-06-29 | End: 2022-06-30 | Stop reason: HOSPADM

## 2022-06-29 RX ORDER — HYDROMORPHONE HYDROCHLORIDE 1 MG/ML
1 INJECTION, SOLUTION INTRAMUSCULAR; INTRAVENOUS; SUBCUTANEOUS
Status: DISCONTINUED | OUTPATIENT
Start: 2022-06-29 | End: 2022-06-30 | Stop reason: HOSPADM

## 2022-06-29 RX ORDER — BUPIVACAINE HYDROCHLORIDE 7.5 MG/ML
INJECTION, SOLUTION INTRASPINAL AS NEEDED
Status: DISCONTINUED | OUTPATIENT
Start: 2022-06-29 | End: 2022-06-29 | Stop reason: HOSPADM

## 2022-06-29 RX ORDER — ROPIVACAINE HYDROCHLORIDE 2 MG/ML
INJECTION, SOLUTION EPIDURAL; INFILTRATION; PERINEURAL
Status: COMPLETED | OUTPATIENT
Start: 2022-06-29 | End: 2022-06-29

## 2022-06-29 RX ORDER — ASPIRIN 81 MG/1
81 TABLET ORAL 2 TIMES DAILY
Status: DISCONTINUED | OUTPATIENT
Start: 2022-06-29 | End: 2022-06-30 | Stop reason: HOSPADM

## 2022-06-29 RX ORDER — CEFAZOLIN SODIUM 1 G/3ML
INJECTION, POWDER, FOR SOLUTION INTRAMUSCULAR; INTRAVENOUS AS NEEDED
Status: DISCONTINUED | OUTPATIENT
Start: 2022-06-29 | End: 2022-06-29 | Stop reason: HOSPADM

## 2022-06-29 RX ORDER — DEXMEDETOMIDINE HYDROCHLORIDE 100 UG/ML
INJECTION, SOLUTION INTRAVENOUS
Status: COMPLETED | OUTPATIENT
Start: 2022-06-29 | End: 2022-06-29

## 2022-06-29 RX ORDER — SODIUM CHLORIDE 0.9 % (FLUSH) 0.9 %
5-40 SYRINGE (ML) INJECTION AS NEEDED
Status: DISCONTINUED | OUTPATIENT
Start: 2022-06-29 | End: 2022-06-30 | Stop reason: HOSPADM

## 2022-06-29 RX ORDER — NALOXONE HYDROCHLORIDE 0.4 MG/ML
0.4 INJECTION, SOLUTION INTRAMUSCULAR; INTRAVENOUS; SUBCUTANEOUS AS NEEDED
Status: DISCONTINUED | OUTPATIENT
Start: 2022-06-29 | End: 2022-06-30 | Stop reason: HOSPADM

## 2022-06-29 RX ORDER — CEFAZOLIN SODIUM/WATER 2 G/20 ML
2 SYRINGE (ML) INTRAVENOUS EVERY 8 HOURS
Status: COMPLETED | OUTPATIENT
Start: 2022-06-29 | End: 2022-06-30

## 2022-06-29 RX ORDER — SODIUM CHLORIDE, SODIUM LACTATE, POTASSIUM CHLORIDE, CALCIUM CHLORIDE 600; 310; 30; 20 MG/100ML; MG/100ML; MG/100ML; MG/100ML
125 INJECTION, SOLUTION INTRAVENOUS CONTINUOUS
Status: DISCONTINUED | OUTPATIENT
Start: 2022-06-29 | End: 2022-06-29

## 2022-06-29 RX ORDER — DEXAMETHASONE SODIUM PHOSPHATE 10 MG/ML
INJECTION INTRAMUSCULAR; INTRAVENOUS
Status: COMPLETED
Start: 2022-06-29 | End: 2022-06-29

## 2022-06-29 RX ORDER — TRANEXAMIC ACID 10 MG/ML
1 INJECTION, SOLUTION INTRAVENOUS
Status: CANCELLED | OUTPATIENT
Start: 2022-06-29 | End: 2022-06-29

## 2022-06-29 RX ORDER — SODIUM CHLORIDE 0.9 % (FLUSH) 0.9 %
5-40 SYRINGE (ML) INJECTION EVERY 8 HOURS
Status: DISCONTINUED | OUTPATIENT
Start: 2022-06-29 | End: 2022-06-29 | Stop reason: HOSPADM

## 2022-06-29 RX ORDER — FENTANYL CITRATE 50 UG/ML
INJECTION, SOLUTION INTRAMUSCULAR; INTRAVENOUS
Status: COMPLETED
Start: 2022-06-29 | End: 2022-06-29

## 2022-06-29 RX ORDER — LORAZEPAM 0.5 MG/1
0.5 TABLET ORAL
Status: DISCONTINUED | OUTPATIENT
Start: 2022-06-29 | End: 2022-06-30 | Stop reason: HOSPADM

## 2022-06-29 RX ORDER — SODIUM CHLORIDE, SODIUM LACTATE, POTASSIUM CHLORIDE, CALCIUM CHLORIDE 600; 310; 30; 20 MG/100ML; MG/100ML; MG/100ML; MG/100ML
100 INJECTION, SOLUTION INTRAVENOUS CONTINUOUS
Status: DISCONTINUED | OUTPATIENT
Start: 2022-06-29 | End: 2022-06-30 | Stop reason: HOSPADM

## 2022-06-29 RX ORDER — LANOLIN ALCOHOL/MO/W.PET/CERES
1 CREAM (GRAM) TOPICAL
Status: DISCONTINUED | OUTPATIENT
Start: 2022-06-30 | End: 2022-06-30 | Stop reason: HOSPADM

## 2022-06-29 RX ORDER — TIZANIDINE 4 MG/1
4 TABLET ORAL
Status: DISCONTINUED | OUTPATIENT
Start: 2022-06-29 | End: 2022-06-30 | Stop reason: HOSPADM

## 2022-06-29 RX ORDER — SODIUM CHLORIDE 0.9 % (FLUSH) 0.9 %
5-40 SYRINGE (ML) INJECTION AS NEEDED
Status: CANCELLED | OUTPATIENT
Start: 2022-06-29

## 2022-06-29 RX ORDER — FENTANYL CITRATE 50 UG/ML
INJECTION, SOLUTION INTRAMUSCULAR; INTRAVENOUS
Status: COMPLETED | OUTPATIENT
Start: 2022-06-29 | End: 2022-06-29

## 2022-06-29 RX ORDER — DULOXETIN HYDROCHLORIDE 60 MG/1
60 CAPSULE, DELAYED RELEASE ORAL DAILY
Status: DISCONTINUED | OUTPATIENT
Start: 2022-06-30 | End: 2022-06-30 | Stop reason: HOSPADM

## 2022-06-29 RX ORDER — KETOROLAC TROMETHAMINE 30 MG/ML
15 INJECTION, SOLUTION INTRAMUSCULAR; INTRAVENOUS EVERY 6 HOURS
Status: DISCONTINUED | OUTPATIENT
Start: 2022-06-29 | End: 2022-06-30 | Stop reason: HOSPADM

## 2022-06-29 RX ORDER — CEFAZOLIN SODIUM/WATER 2 G/20 ML
2 SYRINGE (ML) INTRAVENOUS ONCE
Status: DISCONTINUED | OUTPATIENT
Start: 2022-06-29 | End: 2022-06-29 | Stop reason: HOSPADM

## 2022-06-29 RX ORDER — BUPIVACAINE HYDROCHLORIDE 2.5 MG/ML
INJECTION, SOLUTION EPIDURAL; INFILTRATION; INTRACAUDAL AS NEEDED
Status: DISCONTINUED | OUTPATIENT
Start: 2022-06-29 | End: 2022-06-29 | Stop reason: HOSPADM

## 2022-06-29 RX ORDER — DIPHENHYDRAMINE HYDROCHLORIDE 50 MG/ML
12.5 INJECTION, SOLUTION INTRAMUSCULAR; INTRAVENOUS
Status: DISCONTINUED | OUTPATIENT
Start: 2022-06-29 | End: 2022-06-29 | Stop reason: HOSPADM

## 2022-06-29 RX ORDER — PREGABALIN 75 MG/1
150 CAPSULE ORAL 2 TIMES DAILY
Status: DISCONTINUED | OUTPATIENT
Start: 2022-06-29 | End: 2022-06-30 | Stop reason: HOSPADM

## 2022-06-29 RX ORDER — HYDROMORPHONE HYDROCHLORIDE 1 MG/ML
0.5 INJECTION, SOLUTION INTRAMUSCULAR; INTRAVENOUS; SUBCUTANEOUS
Status: DISCONTINUED | OUTPATIENT
Start: 2022-06-29 | End: 2022-06-29 | Stop reason: HOSPADM

## 2022-06-29 RX ORDER — TRANEXAMIC ACID 10 MG/ML
INJECTION, SOLUTION INTRAVENOUS AS NEEDED
Status: DISCONTINUED | OUTPATIENT
Start: 2022-06-29 | End: 2022-06-29 | Stop reason: HOSPADM

## 2022-06-29 RX ORDER — SODIUM CHLORIDE, SODIUM LACTATE, POTASSIUM CHLORIDE, CALCIUM CHLORIDE 600; 310; 30; 20 MG/100ML; MG/100ML; MG/100ML; MG/100ML
100 INJECTION, SOLUTION INTRAVENOUS CONTINUOUS
Status: DISCONTINUED | OUTPATIENT
Start: 2022-06-29 | End: 2022-06-29 | Stop reason: HOSPADM

## 2022-06-29 RX ORDER — SODIUM CHLORIDE 0.9 % (FLUSH) 0.9 %
5-40 SYRINGE (ML) INJECTION AS NEEDED
Status: DISCONTINUED | OUTPATIENT
Start: 2022-06-29 | End: 2022-06-29 | Stop reason: HOSPADM

## 2022-06-29 RX ORDER — SODIUM CHLORIDE 0.9 % (FLUSH) 0.9 %
5-40 SYRINGE (ML) INJECTION EVERY 8 HOURS
Status: CANCELLED | OUTPATIENT
Start: 2022-06-29

## 2022-06-29 RX ORDER — MONTELUKAST SODIUM 10 MG/1
10 TABLET ORAL EVERY EVENING
Status: DISCONTINUED | OUTPATIENT
Start: 2022-06-29 | End: 2022-06-30 | Stop reason: HOSPADM

## 2022-06-29 RX ORDER — DEXAMETHASONE SODIUM PHOSPHATE 10 MG/ML
INJECTION INTRAMUSCULAR; INTRAVENOUS
Status: COMPLETED | OUTPATIENT
Start: 2022-06-29 | End: 2022-06-29

## 2022-06-29 RX ORDER — MIDAZOLAM HYDROCHLORIDE 1 MG/ML
INJECTION, SOLUTION INTRAMUSCULAR; INTRAVENOUS
Status: COMPLETED
Start: 2022-06-29 | End: 2022-06-29

## 2022-06-29 RX ORDER — ALBUTEROL SULFATE 0.83 MG/ML
2.5 SOLUTION RESPIRATORY (INHALATION)
Status: DISCONTINUED | OUTPATIENT
Start: 2022-06-29 | End: 2022-06-29 | Stop reason: HOSPADM

## 2022-06-29 RX ORDER — FENTANYL CITRATE 50 UG/ML
25 INJECTION, SOLUTION INTRAMUSCULAR; INTRAVENOUS AS NEEDED
Status: DISCONTINUED | OUTPATIENT
Start: 2022-06-29 | End: 2022-06-29 | Stop reason: HOSPADM

## 2022-06-29 RX ORDER — DIPHENHYDRAMINE HCL 25 MG
25 CAPSULE ORAL
Status: DISCONTINUED | OUTPATIENT
Start: 2022-06-29 | End: 2022-06-30 | Stop reason: HOSPADM

## 2022-06-29 RX ORDER — OXYCODONE HYDROCHLORIDE 5 MG/1
5 TABLET ORAL
Status: DISCONTINUED | OUTPATIENT
Start: 2022-06-29 | End: 2022-06-30 | Stop reason: HOSPADM

## 2022-06-29 RX ORDER — ONDANSETRON 2 MG/ML
4 INJECTION INTRAMUSCULAR; INTRAVENOUS
Status: DISCONTINUED | OUTPATIENT
Start: 2022-06-29 | End: 2022-06-30 | Stop reason: HOSPADM

## 2022-06-29 RX ORDER — AMOXICILLIN 250 MG
1 CAPSULE ORAL 2 TIMES DAILY
Status: DISCONTINUED | OUTPATIENT
Start: 2022-06-29 | End: 2022-06-30 | Stop reason: HOSPADM

## 2022-06-29 RX ORDER — SODIUM CHLORIDE 0.9 % (FLUSH) 0.9 %
5-40 SYRINGE (ML) INJECTION EVERY 8 HOURS
Status: DISCONTINUED | OUTPATIENT
Start: 2022-06-29 | End: 2022-06-30 | Stop reason: HOSPADM

## 2022-06-29 RX ORDER — SODIUM CHLORIDE, SODIUM LACTATE, POTASSIUM CHLORIDE, CALCIUM CHLORIDE 600; 310; 30; 20 MG/100ML; MG/100ML; MG/100ML; MG/100ML
INJECTION, SOLUTION INTRAVENOUS
Status: DISCONTINUED | OUTPATIENT
Start: 2022-06-29 | End: 2022-06-29 | Stop reason: HOSPADM

## 2022-06-29 RX ORDER — PROPOFOL 10 MG/ML
INJECTION, EMULSION INTRAVENOUS
Status: DISCONTINUED | OUTPATIENT
Start: 2022-06-29 | End: 2022-06-29 | Stop reason: HOSPADM

## 2022-06-29 RX ORDER — DEXMEDETOMIDINE HYDROCHLORIDE 100 UG/ML
INJECTION, SOLUTION INTRAVENOUS
Status: COMPLETED
Start: 2022-06-29 | End: 2022-06-29

## 2022-06-29 RX ORDER — OXYCODONE HYDROCHLORIDE 5 MG/1
10 TABLET ORAL
Status: DISCONTINUED | OUTPATIENT
Start: 2022-06-29 | End: 2022-06-30 | Stop reason: HOSPADM

## 2022-06-29 RX ORDER — MIDAZOLAM HYDROCHLORIDE 1 MG/ML
INJECTION, SOLUTION INTRAMUSCULAR; INTRAVENOUS
Status: COMPLETED | OUTPATIENT
Start: 2022-06-29 | End: 2022-06-29

## 2022-06-29 RX ORDER — NALOXONE HYDROCHLORIDE 0.4 MG/ML
0.1 INJECTION, SOLUTION INTRAMUSCULAR; INTRAVENOUS; SUBCUTANEOUS AS NEEDED
Status: DISCONTINUED | OUTPATIENT
Start: 2022-06-29 | End: 2022-06-29 | Stop reason: HOSPADM

## 2022-06-29 RX ORDER — PROCHLORPERAZINE EDISYLATE 5 MG/ML
5 INJECTION INTRAMUSCULAR; INTRAVENOUS ONCE
Status: DISCONTINUED | OUTPATIENT
Start: 2022-06-29 | End: 2022-06-29 | Stop reason: HOSPADM

## 2022-06-29 RX ADMIN — PROPOFOL 100 MCG/KG/MIN: 10 INJECTION, EMULSION INTRAVENOUS at 09:23

## 2022-06-29 RX ADMIN — FENTANYL CITRATE 100 MCG: 50 INJECTION, SOLUTION INTRAMUSCULAR; INTRAVENOUS at 09:03

## 2022-06-29 RX ADMIN — PREGABALIN 150 MG: 75 CAPSULE ORAL at 21:47

## 2022-06-29 RX ADMIN — DEXAMETHASONE SODIUM PHOSPHATE 4 MG: 10 INJECTION, SOLUTION INTRAMUSCULAR; INTRAVENOUS at 09:03

## 2022-06-29 RX ADMIN — ASPIRIN 81 MG: 81 TABLET, COATED ORAL at 21:47

## 2022-06-29 RX ADMIN — SODIUM CHLORIDE, POTASSIUM CHLORIDE, SODIUM LACTATE AND CALCIUM CHLORIDE 100 ML/HR: 600; 310; 30; 20 INJECTION, SOLUTION INTRAVENOUS at 16:47

## 2022-06-29 RX ADMIN — TRANEXAMIC ACID 1 G: 10 INJECTION, SOLUTION INTRAVENOUS at 09:47

## 2022-06-29 RX ADMIN — SODIUM CHLORIDE, POTASSIUM CHLORIDE, SODIUM LACTATE AND CALCIUM CHLORIDE 100 ML/HR: 600; 310; 30; 20 INJECTION, SOLUTION INTRAVENOUS at 12:22

## 2022-06-29 RX ADMIN — SODIUM CHLORIDE, SODIUM LACTATE, POTASSIUM CHLORIDE, AND CALCIUM CHLORIDE 125 ML/HR: 600; 310; 30; 20 INJECTION, SOLUTION INTRAVENOUS at 07:23

## 2022-06-29 RX ADMIN — SODIUM CHLORIDE, SODIUM LACTATE, POTASSIUM CHLORIDE, AND CALCIUM CHLORIDE: 600; 310; 30; 20 INJECTION, SOLUTION INTRAVENOUS at 09:11

## 2022-06-29 RX ADMIN — BUPIVACAINE HYDROCHLORIDE IN DEXTROSE 2 ML: 7.5 INJECTION, SOLUTION SUBARACHNOID at 09:16

## 2022-06-29 RX ADMIN — SODIUM CHLORIDE, SODIUM LACTATE, POTASSIUM CHLORIDE, AND CALCIUM CHLORIDE: 600; 310; 30; 20 INJECTION, SOLUTION INTRAVENOUS at 10:20

## 2022-06-29 RX ADMIN — KETOROLAC TROMETHAMINE 15 MG: 30 INJECTION, SOLUTION INTRAMUSCULAR at 23:29

## 2022-06-29 RX ADMIN — CEFAZOLIN 2 G: 1 INJECTION, POWDER, FOR SOLUTION INTRAMUSCULAR; INTRAVENOUS at 09:48

## 2022-06-29 RX ADMIN — SODIUM CHLORIDE, PRESERVATIVE FREE 10 ML: 5 INJECTION INTRAVENOUS at 14:32

## 2022-06-29 RX ADMIN — HYDROMORPHONE HYDROCHLORIDE 1 MG: 1 INJECTION, SOLUTION INTRAMUSCULAR; INTRAVENOUS; SUBCUTANEOUS at 15:51

## 2022-06-29 RX ADMIN — ROPIVACAINE HYDROCHLORIDE 20 ML: 2 INJECTION EPIDURAL; INFILTRATION; PERINEURAL at 09:03

## 2022-06-29 RX ADMIN — CEFAZOLIN 2 G: 10 INJECTION, POWDER, FOR SOLUTION INTRAVENOUS at 18:04

## 2022-06-29 RX ADMIN — MONTELUKAST 10 MG: 10 TABLET, FILM COATED ORAL at 18:04

## 2022-06-29 RX ADMIN — DEXMEDETOMIDINE HYDROCHLORIDE 12 MCG: 100 INJECTION, SOLUTION INTRAVENOUS at 09:03

## 2022-06-29 RX ADMIN — METOPROLOL SUCCINATE 150 MG: 100 TABLET, EXTENDED RELEASE ORAL at 21:47

## 2022-06-29 RX ADMIN — OXYCODONE 10 MG: 5 TABLET ORAL at 14:31

## 2022-06-29 RX ADMIN — KETOROLAC TROMETHAMINE 15 MG: 30 INJECTION, SOLUTION INTRAMUSCULAR at 18:04

## 2022-06-29 RX ADMIN — ACETAMINOPHEN 1000 MG: 500 TABLET ORAL at 14:31

## 2022-06-29 RX ADMIN — DOCUSATE SODIUM 50 MG AND SENNOSIDES 8.6 MG 1 TABLET: 8.6; 5 TABLET, FILM COATED ORAL at 21:47

## 2022-06-29 RX ADMIN — MIDAZOLAM 2 MG: 1 INJECTION INTRAMUSCULAR; INTRAVENOUS at 09:03

## 2022-06-29 RX ADMIN — SODIUM CHLORIDE, POTASSIUM CHLORIDE, SODIUM LACTATE AND CALCIUM CHLORIDE 100 ML/HR: 600; 310; 30; 20 INJECTION, SOLUTION INTRAVENOUS at 19:54

## 2022-06-29 RX ADMIN — ACETAMINOPHEN 1000 MG: 500 TABLET ORAL at 21:47

## 2022-06-29 NOTE — ANESTHESIA PROCEDURE NOTES
Spinal Block    Start time: 6/29/2022 9:13 AM  End time: 6/29/2022 9:18 AM  Performed by: Demian Aguilar CRNA  Authorized by: Singh Damon MD     Pre-procedure:  Preanesthetic Checklist: patient identified, risks and benefits discussed, anesthesia consent, site marked, patient being monitored, timeout performed and fire risk safety assessment completed and verbalized      Spinal Block:   Patient Position:  Seated  Prep Region:  Lumbar  Prep: Betadine      Location:  L3-4  Technique:  Single shot      Med Admin Time: 6/29/2022 9:16 AM    Needle:   Needle Type:   Madhu  Needle Gauge:  25 G  Attempts:  1      Events: CSF confirmed        Assessment:  Insertion:  Uncomplicated

## 2022-06-29 NOTE — PERIOP NOTES
TRANSFER - OUT REPORT:    Verbal report given to [address] Renetta Kumar RN on Renetta Presume  being transferred to Phase 2 (unit) for routine progression of care       Report consisted of patients Situation, Background, Assessment and   Recommendations(SBAR). Information from the following report(s) SBAR was reviewed with the receiving nurse. Lines:   Peripheral IV 06/29/22 Left Hand (Active)   Site Assessment Clean, dry, & intact 06/29/22 1155   Phlebitis Assessment 0 06/29/22 1155   Infiltration Assessment 0 06/29/22 1155   Dressing Status Clean, dry, & intact 06/29/22 1155   Dressing Type Transparent;Tape 06/29/22 1155   Hub Color/Line Status Green;Patent; Infusing 06/29/22 1155        Opportunity for questions and clarification was provided.       Patient transported with:   [facility]

## 2022-06-29 NOTE — PERIOP NOTES
[facility] made aware that SBAR is ready.  Client going to  [address] and [facility] [address]

## 2022-06-29 NOTE — DISCHARGE SUMMARY
[address] [address]     DISCHARGE SUMMARY     PATIENT: Bishop Verdin     MRN: [id number]   ADMIT DATE: 2022   BILLIN   DISCHARGE DATE:  2022     ATTENDING: Derrick Ko MD   DICTATING: Eleni Faulkner PA-C     ADMISSION DIAGNOSIS: Total knee replacement status, left [Z96.652]    DISCHARGE DIAGNOSIS: Status post OSTEOARTHIRITS LEFT KNEE    HISTORY OF PRESENT ILLNESS: The patient is a 45y.o. year-old female   with ongoing left knee pain secondary to osteoarthritis of her left knee. The patient's pain has persisted and progressed despite conservative treatments and therapies. The patient has at this time opted for surgical intervention.     PAST MEDICAL HISTORY:   Past Medical History:   Diagnosis Date    Allergic rhinitis     Arthritis     osteoarthirtis    Asthma     Autoimmune disease ([facility] [address]     Psoriatic arthritis     Autoimmune disease ([facility] [address]     fibromalgia    Fibromyalgia     GERD (gastroesophageal reflux disease)     Ill-defined condition     Mitral valve prolapse    Migraine     pt denies     MVP (mitral valve prolapse)     Nausea & vomiting     nausea, vomiting had success with patch    PVC (premature ventricular contraction)     PVC deloris valve prolapse       PAST SURGICAL HISTORY:   Past Surgical History:   Procedure Laterality Date    COLONOSCOPY N/A 10/30/2019    COLONOSCOPY, POLYPECTOMY performed by Giles Esparza MD at [address]      L4L5S1 fusion    HX BREAST BIOPSY Bilateral     HX [place] & 2000    HX DILATION AND CURETTAGE      and endometrial ablation    HX LAP CHOLECYSTECTOMY  2017    HX ORTHOPAEDIC Bilateral     foot surgery    HX ORTHOPAEDIC Left 2017    bunionectomy       ALLERGIES:   Allergies   Allergen Reactions    Adhesive Tape-Silicones Other (comments)     Blisters  Patient reports she can tolerate paper tape    Tramadol Other (comments)     hallucinations        CURRENT MEDICATIONS:  A list of medications prior to the time of admission include:  Prior to Admission medications    Medication Sig Start Date End Date Taking? Authorizing Provider   sulfaSALAzine (AZULFIDINE) 500 mg tablet Take 500 mg by mouth two (2) times a day. Takes 2 tabs in am and 2 tabs in pm   Yes Provider, Historical   montelukast (SINGULAIR) 10 mg tablet Take 10 mg by mouth every evening. Yes Provider, Historical   albuterol (PROAIR HFA) 90 mcg/actuation inhaler Take 2 Puffs by inhalation every four (4) hours as needed for Wheezing. Yes Provider, Historical   metoprolol succinate (TOPROL XL PO) Take 150 mg by mouth two (2) times a day. Yes Provider, Historical   DULoxetine (CYMBALTA) 30 mg capsule Take 60 mg by mouth daily. Indications: FIBROMYALGIA 10/8/15  Yes Provider, Historical   pregabalin (LYRICA) 150 mg capsule Take 150 mg by mouth two (2) times a day. Indications: disorder characterized by stiff, tender & painful muscles 4/4/16  Yes Provider, Historical   multivitamin (DAILY VALUE) tablet Take 1 Tab by mouth daily. 3/24/14  Yes Provider, Historical   tiZANidine (ZANAFLEX) 4 mg tablet Take 4 mg by mouth every eight (8) hours as needed for Muscle Spasm(s). Provider, Historical   diclofenac EC (VOLTAREN) 75 mg EC tablet Take 75 mg by mouth two (2) times a day. Provider, Historical   methotrexate (RHEUMATREX) 2.5 mg tablet Take 12.5 mg by mouth. Takes 5 pills every Friday morning and 5 pills every Friday evening    Provider, Historical   omeprazole (PRILOSEC) 20 mg capsule Take 20 mg by mouth two (2) times a day. Provider, Historical   folic acid (FOLVITE) 1 mg tablet Take 1 mg by mouth daily. Provider, Historical       FAMILY HISTORY: History reviewed. No pertinent family history.     SOCIAL HISTORY:   Social History     Socioeconomic History    Marital status:    Tobacco Use    Smoking status: Never Smoker    Smokeless tobacco: Never Used   Vaping Use    Vaping Use: Never used   Substance and Sexual Activity    Alcohol use: Yes     Comment: 1 glass of wine every 6 months    Drug use: Never    Sexual activity: Yes       REVIEW OF SYSTEMS: All review of systems are negative. PHYSICAL EXAMINATION: For a detailed physical exam, please refer to the patient's chart. HOSPITAL COURSE: The patient was taken to surgery the day of admission. she underwent a left total knee replacement. Operative course was benign. Estimated blood loss was approximately 100 cc. The patient was taken to the PACU in stable condition and was later taken to the floor in stable condition. During her hospital stay, the patient progressed well with physical therapy and occupational therapy, adherent to instructions. she had been cleared by physical therapy with stair training. she was placed on Aspirin for DVT prophylaxis. her pain has been well controlled with oral pain medications. her vitals have remained stable. she has also remained hemodynamically stable. The patient has been recommended for discharge home on POD#2. DISCHARGE INSTRUCTIONS: The patient is to be discharged home with home health. she is to continue on her prior medications per the medication reconciliation form, to which we will add:  1. Ecotrin 81 mg; 1 tablet p.o. Twice daily X 6 weeks  2. Colace 100 mg po TID prn constipation  3. Percocet 5/325 mg; 1-2 tablets p.o. every 4 to 6 hours p.r.n. for pain  Toradol 10 mg p.o twice daily for 5 days. 4. Naproxen 375 mg p.o twice daily as needed for pain, to be started day after the 5 days or toradol. DO NOT TAKE TORADOL OR NAPROXEN TOGETHER. **Medications were sent prior to surgery to patient's outpatient pharmacy. The patient is to continue at home with home physical therapy 3 times a week to work on gait training, range of motion, strengthening, and weightbearing exercises as tolerated on her left lower extremity.   The patient is to progress from a walker to a cane to complete total weightbearing as tolerable. The patient is to continue to keep her incision dry. The patient is to followup with Dr. Danica Abreu in the office approximately 1-2 weeks status post for x-rays and further evaluation.     Sheldon Reagan PA-C  06/30/2022 07:56 AM

## 2022-06-29 NOTE — ANESTHESIA PROCEDURE NOTES
Left Adductor Canal Peripheral Block    Start time: 6/29/2022 8:58 AM  End time: 6/29/2022 9:03 AM  Performed by: Shante Singer MD  Authorized by: Shante Singer MD       Pre-procedure: Indications: at surgeon's request and post-op pain management    Preanesthetic Checklist: patient identified, risks and benefits discussed, site marked, timeout performed, anesthesia consent given and patient being monitored    Timeout Time: 08:57 EDT          Block Type:   Block Type:   Adductor canal block  Laterality:  Left  Monitoring:  Standard ASA monitoring, responsive to questions, oxygen, continuous pulse ox, frequent vital sign checks and heart rate  Injection Technique:  Single shot  Procedures: ultrasound guided    Patient Position: supine (froglegged)  Prep: chlorhexidine    Location:  Mid thigh  Needle Type:  Stimuplex  Needle Gauge:  20 G  Medication Injected:  FentaNYL citrate (PF) injection sedation initial, 100 mcg  midazolam (VERSED) injection, 2 mg  ropivacaine (NAROPIN) 2 mg/mL (0.2 %) injection, 20 mL  dexmedeTOMidine (PRECEDEX) 100 mcg/mL iv solution, 12 mcg  dexamethasone (PF) (DECADRON) 10 mg/mL injection, 4 mg  Med Admin Time: 6/29/2022 9:03 AM    Assessment:  Number of attempts:  1  Injection Assessment:  Incremental injection every 5 mL, negative aspiration for blood, no intravascular symptoms, no paresthesia and ultrasound image on chart  Patient tolerance:  Patient tolerated the procedure well with no immediate complications

## 2022-06-29 NOTE — PROGRESS NOTES
End of Shift Note    Bedside shift change report given to [address] (oncoming nurse) by Alok Fowler, RN (offgoing nurse). Report included the following information SBAR, Kardex, ED Summary, Procedure Summary, Intake/Output, MAR, Recent Results and Med Rec Status    Shift worked:  7962 - 0751   Shift summary and any significant changes:     PRN medications for pain given. Concerns for physician to address:  None    Zone phone for oncoming shift:        Patient Information  Autumn Keith  45 y.o.  6/29/2022  6:24 AM by Javon Cavanaugh MD. Autumn Keith was admitted from Home    Problem List  Patient Active Problem List    Diagnosis Date Noted    Total knee replacement status, left 06/29/2022    History of total knee arthroplasty, right 03/31/2021    DDD (degenerative disc disease), lumbar 08/09/2014    Spondylolisthesis of lumbar region 08/09/2014    Lumbar spondylolysis 08/09/2014     Past Medical History:   Diagnosis Date    Allergic rhinitis     Arthritis     osteoarthirtis    Asthma     Autoimmune disease ([address] [address]     Psoriatic arthritis     Autoimmune disease ([facility] [address]     fibromalgia    Fibromyalgia     GERD (gastroesophageal reflux disease)     Ill-defined condition     Mitral valve prolapse    Migraine     pt denies 2021    MVP (mitral valve prolapse)     Nausea & vomiting     nausea, vomiting had success with patch    PVC (premature ventricular contraction)     PVC deloris valve prolapse       Core Measures:  Post Op Total Left Knee       Activity:  Activity Level: Up with Assistance  Number times ambulated in hallways past shift: 0  Number of times OOB to chair past shift: 1    Cardiac:   Cardiac Monitoring: No      Cardiac Rhythm: Sinus Rhythm    Access:   Current line(s): PIV   Central Line? No  PICC LINE? No    Genitourinary:   Urinary status: voiding  Urinary Catheter?  No     Respiratory:   O2 Device: None (Room air)  Chronic home O2 use?: NO  Incentive spirometer at bedside: YES  Actual Volume (ml): 2000 ml goal of 2500    GI:     Current diet:  ADULT DIET Regular  Passing flatus: YES  Tolerating current diet: YES       Pain Management:   Patient states pain is manageable on current regimen: YES    Skin:  Moris Score: 20  Interventions: increase time out of bed and PT/OT consult    Patient Safety:  Fall Score:  Total Score: 4  Interventions: assistive device (walker, cane, etc), gripper socks and pt to call before getting OOB  High Fall Risk: Yes  @Rollbelt  @dexterity to release roll belt  Yes/No ( must document dexterity  here by stating Yes or No here, otherwise this is a restraint and must follow restraint documentation policy.)    DVT prophylaxis:  DVT prophylaxis Med- No  DVT prophylaxis SCD or ARIANNA- Yes     Wounds: (If Applicable)  Wounds- Yes  Location Incision to left knee     Active Consults:  None    Length of Stay:  Expected LOS: - - -  Actual LOS: 0  Discharge Plan: Yes       Paul Gomes RN

## 2022-06-29 NOTE — PROGRESS NOTES
1910 - Assumed care at this time. 2000 - Patient A&Ox4, RA. Denies chest pain and SOB. ACE dressing to left knee C/D/I. Denies numbness/tingling/calf pain. Pain 0/10 with a tolerable level of 4/10. Plexi compression device bilaterally. ARIANNA to RLE. Pt educated on IS use, q2h rounds, pain management, CHG wipes and \"Up for Meals\". Pt verbalized understanding, no concerns voiced. Call bell within reach, bed in lowest position. Pt encouraged to call for assistance.

## 2022-06-29 NOTE — ANESTHESIA PREPROCEDURE EVALUATION
Relevant Problems   No relevant active problems       Anesthetic History     PONV          Review of Systems / Medical History  Patient summary reviewed, nursing notes reviewed and pertinent labs reviewed    Pulmonary            Asthma : well controlled  Pertinent negatives: No sleep apnea and smoker     Neuro/Psych   Within defined limits           Cardiovascular            Dysrhythmias : PVC      Exercise tolerance: >4 METS     GI/Hepatic/Renal     GERD: well controlled        Pertinent negatives: No hiatal hernia   Endo/Other        Arthritis     Other Findings              Physical Exam    Airway  Mallampati: I  TM Distance: > 6 cm  Neck ROM: normal range of motion   Mouth opening: Normal     Cardiovascular    Rhythm: regular  Rate: normal         Dental  No notable dental hx       Pulmonary  Breath sounds clear to auscultation               Abdominal  GI exam deferred       Other Findings            Anesthetic Plan    ASA: 2  Anesthesia type: spinal      Post-op pain plan if not by surgeon: peripheral nerve block single      Anesthetic plan and risks discussed with: Patient

## 2022-06-29 NOTE — DISCHARGE INSTRUCTIONS
2 [facility]     Patient Discharge Instructions    Carmen Courtney / [id number] : 1970    Admitted 2022 Discharged: 2022     IF YOU HAVE ANY PROBLEMS ONCE YOU ARE AT HOME CALL THE FOLLOWING NUMBERS:   Main office number: [phone]    Your follow up appointment to see either Dr. Deedee Bhagat is scheduled in 1-2 weeks. If you are unsure of your appointment date call the office at [phone]. Take Home Medications     · Resume your home medictions as directed  · A prescription for pain medication has been given   · It is important that you take the medication exactly as they are prescribed. · Keep your medication in the bottles provided by the pharmacist and keep a list of the medication names, dosages, and times to be taken in your wallet. · Do not take other medications without consulting your doctor. · Note:  If you have already received and/or filled a prescription for one or more of the medications you've received a prescription for when leaving the hospital, you may disguard the duplicate prescription. What to do at [address] [place] [address] Ave your prehospital diet. If you have excessive nausea or vomitting call your doctor's office     Wear portable sequential compressive devices at least 18 hours per day for 2 weeks. They may be used beyond 2 weeks as needed to help control swelling. ARIANNA hose should be worn during the day - may be removed for sleep. Should be worn on both legs for 2 weeks and then on the operative extremity for a total of 6 weeks. Begin In-Home Physical Therapy; 3 times a week to work on gait training, range of motion, strengthening, and weight bearing exercises as tolerable. Continue to use your walker or cane when walking. May progress from the walker to a cane to complete total bearing as tolerable. Keep incision DRY. You may need to sponge bathe to keep the incision dry.     When to Call    - Call if you have a temperature greater then 101  - Unable to keep food down  - Are unable to bear any wieght   - Need a pain medication refill     Information obtained by :  I understand that if any problems occur once I am at home I am to contact my physician. I understand and acknowledge receipt of the instructions indicated above.                                                                                                                                            Physician's or R.N.'s Signature                                                                  Date/Time                                                                                                                                              Patient or Representative Signature                                                          Date/Time

## 2022-06-29 NOTE — PROGRESS NOTES
TRANSFER - IN REPORT:    Verbal report received from [facility] Friday RN (name) on Josie De La Torre  being received from PACU (unit) for routine progression of care      Report consisted of patients Situation, Background, Assessment and   Recommendations(SBAR). Information from the following report(s) SBAR, Kardex, OR Summary, Procedure Summary, Intake/Output, MAR and Recent Results was reviewed with the receiving nurse. Opportunity for questions and clarification was provided. Assessment completed upon patients arrival to unit and care assumed.

## 2022-06-29 NOTE — OP NOTES
[address]  [address] [place] [address], [phone]    LEFT TOTAL KNEE ARTHROPLASTY    Patient: Jose Antonio Aponte MRN: [id number]  SSN: [id number]    YOB: 1970  Age: 45 y.o. Sex: female      Date of Surgery: 6/29/2022   Preoperative Diagnosis: OSTEOARTHIRITS LEFT KNEE   Postoperative Diagnosis: OSTEOARTHIRITS LEFT KNEE   Location: [facility]  Surgeon: Tisha Lam MD  Physician Assistant: JAZMINE Frey was medically necessary for holding of retractors for exposure and to complete the case. Assistant: Circ-1: Torri Mensah  Physician Assistant: Spencer Escalante PA-C  Scrub Tech-1: Greyson Mock RN-1: Elia Belle RN  Surg Asst-1: Farhad RAY    Anesthesia: Spinal + Low femoral Nerve Block + local    Procedure: Left Total Knee Arthroplasty (CPT: 13485)    Findings:  Degenerative joint disease of the left knee     Estimated Blood Loss: 100 mL    Fluids: see anesthesia record    Specimens: None    Cultures: None    Complications: None    Tourniquet Time:   Total Tourniquet Time Documented:  Thigh (Left) - 59 minutes  Total: Thigh (Left) - 59 minutes    Tourniquet Pressure: 280 mm Hg    Tranexamic Acid: 1 gram IV: one dose at begining of case and one at the end    Exparel solution: 20 mL (13 mg/mL) + 40 mL NS    Implants:   Implant Name Type Inv.  Item Serial No.  Lot No. LRB No. Used Action   CEMENT BNE 40GM FULL DOSE PMMA W/O ANTIBIO HI VISC N RADPQ - [id number]  CEMENT BNE 40GM FULL DOSE PMMA W/O ANTIBIO HI VISC N RADPQ  [facility] DE[employer] ORTHOPEDICS_WD [id number] Left 1 Implanted   CEMENT BNE 40GM FULL DOSE PMMA W/ GENT HI VISC RADPQ LNG - [id number]  CEMENT BNE 40GM FULL DOSE PMMA W/ GENT HI VISC RADPQ LNG  [facility] DEPUY [employer] OR[employer]OPED[employer]_WD [id number] Left 1 Implanted   COMPONENT FEM SZ 4 L KNEE OXINIUM BI CRUCE STBL JOURNEY II - [id number]  COMPONENT FEM SZ 4 L KNEE OXINIUM BI CRUCE STBL Maida Dubois [employer] ORTH[employer]_WD [id number] Left 1 Implanted   BASEPLATE TIB SZ 2 UL75KG ML64MM L KNEE NP BRANDI LINCOLN - [id number]  BASEPLATE TIB SZ 2 [id number] ML64MM L KNEE NP BRANDI LINCOLN  [facility] AND [employer] ORTHOPAEDICS_WD [id number] Left 1 Implanted   PAT OVL RESURF FAYE II 29MM --  - [id number]  PAT OVL RESURF FAYE II 29MM --   NARAYANAN AND [employer] OR[employer] [id number] Left 1 Implanted   STEM FEM L70MM OD10MM LNG GEN II - [id number]  STEM FEM L70MM OD10MM LNG GEN II  [employer] ORTHOPAEDICS_ [id number] Left 1 Implanted   INSERT TIB SZ 1-2 THK9MM LT KNEE BI CRUC STBL CONSTRN [facility] - [id number]  INSERT TIB SZ 1-2 THK9MM LT KNEE BI CRUC STBL CONSTRN [facility]  NARAYANAN AND NEPHEW Bianca [employer]e [id number] Left 1 Implanted        Patient Condition: Stable.    ---------  INDICATIONS:   This 45y.o.-year-old female has had pain in the left knee for years and has become functionally disabled with respect to the activities of daily living. The pain is increased with weight-bearing. The pain and dysfunction were not releaved by at least three months of conservative therapy such as NSAIDS (ibuprofen, aleve), analgesics (tylenol), structured flexibility and muscle strengthening physical therapy exercises, activity restrictions, intra-articular cortisone injections, bracing, and use of a cane. The radiographs showed varus alignment and advanced arthritis with joint space narrowing, subchondral sclerosis, and periarticular osteophytes. A left total knee replacement has been recommended. The risks and the possible complications of this surgery and anesthesia including, but not exclusive to, bleeding, infection, dislocation, fracture, blood clots, nerve and vascular injury, possible need for other procedures, and possibly death have been explained to the patient.   The patient accepts these risks for the benefits of joint replacement over the failure of non-operative care to provide adequate pain relief and improve their functional disability. The patients medical problems have been addressed by their primary care physician and are deemed stable and as well controlled as possible. Inpatient hospital care was medically necessary, reasonable, and appropriate. This is a medically necessary procedure. As such, without use of a surgical assistant to retract soft tissues, protect vital neuro-vascular structures and assist in the technical aspects of the operation, this procedure would not have been possible. Therefore this assistant was medically necessary. DESCRIPTION OF PROCEDURE:    After the risks and benefits of surgery were discussed, informed consent was obtained. The patient was identified in the preoperative holding area and the operative extremity was marked. Preoperatively a low femoral nerve block was performed by anesthesia. The patient was taken to the operating room and placed in the supine position. Spinal anesthesia was performed. IV antibiotics were given. After verification of the appropriate operative site from the consent form, with confirmation of surgeon, anesthesia and nursing teams, a tourniquet was placed and the left leg was prepped and draped in sterile fashion using Chlorhexidine, hydrogen peroxide and Chlorhexidine, hydrogen peroxide and Chloraprep. A formal timeout was performed. The tourniquet was inflated and a midline incision was made over the anterior knee medial to the tibial tubercle and the dissection was taken down to Jayy's fascia. A medial parapatellar arthrotomy was performed, medial release was made and the infrapatellar fat pad was trimmed. The anterior portions of the medial and lateral menisci were excised. The patella measured 21 mm. A freehand patellar cut was made followed by placement of the protective plate. The knee was flexed and the patella was  allowed to subluxate into the lateral gutter and the knee was flexed.   Inspection of the knee revealed cartilage loss from all three compartments greatest medially. The femur was drilled and intramedullary cutting jig was placed in 5 degrees of valgus and 9 mm of distal resection. The distal femoral cut was made. The tibia was then subluxed anteriorly with a large bent knee retractor. The PCL was released from the posterior tibia. The remaining medial and lateral menisci and the periarticular osteophytes were removed. The lateral genicular artery was cauterized. An intramedullary tibial guide was used and a tibial cut was made just inferior to the osteoarticular junction. The extension gap was assessed to ensure full extension could be achieved. The PCL was released. A sizing tibial plate was then pinned into place and alignment was checked. The tibia was reamed for a 10 x 70 mm stem and broached and any additional osteophytes were removed. The femoral sizer was used to measure the femoral size as well as estimate femoral component rotation using the posterior condylar line as a reference. Gap measuring blocks and the gap /sizing device was then used to examine flexion and extension gaps and confirm femoral component size and rotation. Positioning pins for the distal femoral cutting block were placed into the femur through the /sizing device. The femoral cutting block was placed. Anterior-posterior position of the cutting guide was checked using a [employer]. The anterior, posterior and chamfer cuts were made. Posterior osteophytes were removed. Tibial and femoral trial implants were placed. The trochlear notch bone was removed. The patella preparation was then completed with sizing and drilling of the patellar lugs. The trial patellar implant was placed and measured 21 mm. Patellar tracking was midline so no lateral retinacular release was needed. Ligament balancing was performed as needed with release of MCL done on approach as well as the PCL and popliteus. The popliteus was sacrificed due to slight femoral component overhand laterally - release would prevent impingement. The PCL was sacrificed. A BCS constrained implant was used. Excellent stability was achieved. The trial components were removed. 20 cc of Exparel solution and 10 cc of 0.25% Marcaine were injected into the posterior soft tissues. Care was taken to aspirate prior to injecting to prevent intravascular anesthetic injection. The cement was prepared. After preparing the bony surfaces with pulsatile lavage saline, the real components were cemented into place with the final 9 mm constrained liner. Excess cement was removed prior to hardening with the cement allowed to set up with axial pressure across the knee in full extension. The remaining 40 cc of Exparel solution and an additional 20 cc of 0.25% Marcaine were injected into the deep and superficial soft tissues around the knee as well as the periosteum. After drying of the cement, the knee was checked for any remaining excess cement and irrigated. The tourniquet was released and hemostasis was achieved. A standard layered closure was performed using #1 Stratafix PDS for the fascia, 0 and 3-0 Vicryl for the subcutaneous and subcuticular layers followed by a running subcuticular skin closure with a 3-0 Monocryl. PRINEO was applied. Sterile dressings were placed. The patient was awakened and there were no complications. Final sponge and needle counts were correct x2.     Elizabeth Swan MD

## 2022-06-29 NOTE — PROGRESS NOTES
Progress Note        Patient: Odilia Gipson MRN: [id number]  SSN: [id number]    YOB: 1970  Age: 45 y.o. Sex: female      Day of Surgery status post Procedure(s) (LRB):  LEFT TOTAL KNEE ARTHROPLASTY AND ALL INDICATED PROCEDURES (Left)    Admit Date: 2022  Admit Diagnosis: Total knee replacement status, left [Z96.652]    Subjective:      POD#1 Doing well. No complaints. No SOB. No Chest Pain. No Nausea or Vomiting. No problems eating or voiding. Objective:        Temp (24hrs), Av.4 °F (36.3 °C), Min:96.5 °F (35.8 °C), Max:98.6 °F (37 °C)    Body mass index is 39.29 kg/m².   Patient Vitals for the past 12 hrs:   BP Temp Pulse Resp SpO2 Height Weight   22 1502 121/76 97.5 °F (36.4 °C) 66 14 93 % -- --   22 1244 116/73 97.5 °F (36.4 °C) 67 14 100 % -- --   22 1228 122/75 -- 72 18 100 % -- --   22 1227 -- -- 69 10 100 % -- --   22 1226 -- -- 74 14 99 % -- --   22 1225 -- -- 74 14 100 % -- --   22 1224 -- -- 81 17 100 % -- --   22 1223 127/64 -- 70 11 100 % -- --   22 1222 -- -- 74 12 100 % -- --   22 1221 -- -- 75 14 100 % -- --   22 1220 -- -- 70 12 100 % -- --   22 1219 -- -- 81 24 100 % -- --   22 1218 (!) 115/54 -- 76 10 100 % -- --   22 1217 -- -- 71 12 100 % -- --   22 1216 -- -- 85 30 100 % -- --   22 -- -- 72 15 100 % -- --   22 -- -- 68 12 100 % -- --   22 -- -- 74 13 100 % -- --   22 108/60 -- 77 14 96 % -- --   22 -- -- 69 11 100 % -- --   22 -- -- 70 12 99 % -- --   22 -- -- 73 11 100 % -- --   22 -- -- 70 16 99 % -- --   22 104/60 -- 69 13 99 % -- --   22 -- -- 70 13 99 % -- --   22 -- -- 70 20 99 % -- --   22 -- -- 72 15 98 % -- --   22 -- -- 73 11 98 % -- --   22 90/63 -- 78 10 100 % -- --   22 -- -- 72 10 100 % -- -- 06/29/22 1200 -- -- 76 13 100 % -- --   06/29/22 1159 -- -- 75 12 100 % -- --   06/29/22 1158 -- -- 77 9 100 % -- --   06/29/22 1157 92/60 -- 77 9 100 % -- --   06/29/22 1156 -- -- 72 13 100 % -- --   06/29/22 1155 -- -- 75 8 100 % -- --   06/29/22 1154 -- -- 81 10 100 % -- --   06/29/22 1153 -- -- 76 9 98 % -- --   06/29/22 1152 -- -- 76 9 99 % -- --   06/29/22 1151 -- -- 81 12 100 % -- --   06/29/22 1150 -- -- 76 10 99 % -- --   06/29/22 1149 -- -- 84 15 99 % -- --   06/29/22 1148 -- -- 83 15 100 % -- --   06/29/22 1147 105/63 -- 80 12 100 % -- --   06/29/22 1146 -- -- 84 11 96 % -- --   06/29/22 1145 -- -- 83 12 97 % -- --   06/29/22 1135 113/71 -- -- 11 -- -- --   06/29/22 1134 122/64 98.6 °F (37 °C) 90 14 99 % -- --   06/29/22 1130 106/68 -- 88 15 -- -- --   06/29/22 0803 (!) 144/72 -- 72 -- 100 % -- --   06/29/22 0802 -- -- 75 -- 99 % -- --   06/29/22 0723 -- -- 77 21 100 % -- --   06/29/22 0722 (!) 149/68 -- 74 17 100 % -- --   06/29/22 0721 -- -- 78 19 100 % -- --   06/29/22 0720 (!) 142/72 -- 78 16 100 % -- --   06/29/22 0710 -- 97 °F (36.1 °C) -- -- -- -- --   06/29/22 0640 (!) 151/99 (!) 96.5 °F (35.8 °C) 91 14 95 % 5' 6\" (1.676 m) 110.4 kg (243 lb 6.4 oz)     No results for input(s): HGB, HCT, INR, NA, K, CL, CO2, BUN, CREA, GLU, HGBEXT, HCTEXT, INREXT in the last 72 hours. Physical Exam:  Vital Signs are Stable, few episodes of hypertension  No Acute Distress. Alert and Oriented. Negative Homans sign. Toes AROM Full. Neurovascular exam is normal.    Dressing is Clean, Dry, and Intact. Assessment/Plan:     1. Patient doing well. POD#1  2. Out of BED / PT / WBAT  3. DVT ppx: Mobilization, Ecotrin, ARIANNA hose, and mechanical compression  4. D/c today pending approval from PT/OT and adequate analgesia management.     Continue PT/OT  Continue ROM    Discharge Plan: Home    Signed By: Fuad New PA-C     06/30/2022

## 2022-06-29 NOTE — PROGRESS NOTES
Problem: Mobility Impaired (Adult and Pediatric)  Goal: *Acute Goals and Plan of Care (Insert Text)  Description: Goals to be addressed in 1-3 days:  1. Supine to sit and sit to supine SBA with HR for meals. 2. Sit to stand and stand to sit SBA/CGA with RW in prep for ambulation. 3. Ambulate 100ft SBA/CGA with RW, WBAT, for home/community mobility. 4. Ascend/descend a 3 stair steps CGA/Yunior without HR for home entry. Note: []  Patient has met [facility] for d/c home  []  Recommend HH with 24 hour adult care   [x]  Benefit from additional acute PT session to address:  gait and stair training    PHYSICAL THERAPY EVALUATION    Patient: Jacquelyn Morrison (17 y.o. female)  Date: 6/29/2022  Primary Diagnosis: Total knee replacement status, left [Z96.652]  Procedure(s) (LRB):  LEFT TOTAL KNEE ARTHROPLASTY AND ALL INDICATED PROCEDURES (Left) Day of Surgery   Precautions:  Fall,WBAT    ASSESSMENT :  Based on the objective data described below, the patient presents with lower extremity weakness, s/p L TKA. Pt performed supine to sit with SBA, sit to stand with CGA. Patient ambulated 100 feet with RW, GB applied, CGA. Pt educated on icing, elevation, positioning, home safety, home exercise program, and activity recommendations. Home health physical therapy is recommended upon discharge from hospital. Will continue gait and stair training next session. Patient will benefit from skilled intervention to address the above impairments.   Patient's rehabilitation potential is considered to be Good  Factors which may influence rehabilitation potential include:   [x]         None noted  []         Mental ability/status  []         Medical condition  []         Home/family situation and support systems  []         Safety awareness  []         Pain tolerance/management  []         Other:      PLAN :  Recommendations and Planned Interventions:   [x]           Bed Mobility Training             []    Neuromuscular Re-Education  [x]           Transfer Training                   []    Orthotic/Prosthetic Training  [x]           Gait Training                          [x]    Modalities  [x]           Therapeutic Exercises           [x]    Edema Management/Control  [x]           Therapeutic Activities            [x]    Family Training/Education  [x]           Patient Education  []           Other (comment):    Frequency/Duration: Patient will be followed by physical therapy 1-2x daily until patient is discharged from hospital.  Discharge Recommendations: Home Health  Further Equipment Recommendations for Discharge: N/A     SUBJECTIVE:   Patient stated I am doing pretty good.     OBJECTIVE DATA SUMMARY:     Past Medical History:   Diagnosis Date    Allergic rhinitis     Arthritis     osteoarthirtis    Asthma     Autoimmune disease ([facility] [address]     Psoriatic arthritis     [facility]     fibromalgia    Fibromyalgia     GERD (gastroesophageal reflux disease)     Ill-defined condition     Mitral valve prolapse    Migraine     pt denies 2021    MVP (mitral valve prolapse)     Nausea & vomiting     nausea, vomiting had success with patch    PVC (premature ventricular contraction)     PVC deloris valve prolapse     Past Surgical History:   Procedure Laterality Date    COLONOSCOPY N/A 10/30/2019    COLONOSCOPY, POLYPECTOMY performed by Jennifer Dolan MD at [address] [place] [address]  2014    L4L5S1 fusion    HX BREAST BIOPSY Bilateral      [address]      and endometrial ablation    HX LAP CHOLECYSTECTOMY  2017    HX ORTHOPAEDIC Bilateral     foot surgery    HX ORTHOPAEDIC Left 06/2017    bunionectomy     Barriers to Learning/Limitations: None  Compensate with: Visual Cues, Verbal Cues, and Tactile Cues  PLOF: Independent ambulation without AD  Home Situation:  Home Situation  Home Environment: Private residence  # Steps to Enter: 3  Rails to Enter: No  One/Two Story Residence: Two story, live on 1st floor  Living Alone: No  Support Systems: Spouse/Significant Other  Patient Expects to be Discharged to[de-identified] Home with home health  Current DME Used/Available at Home: Cane, straight,Walker, rolling  Strength:    Strength: Generally decreased, functional  Tone & Sensation:   Tone: Normal  Sensation: Impaired  Range Of Motion:  AROM: Generally decreased, functional  Functional Mobility:  Bed Mobility:   Supine to Sit: Stand-by assistance  Sit to Supine: Stand-by assistance   Transfers:  Sit to Stand: Contact guard assistance  Stand to Sit: Stand-by assistance  Balance:   Sitting: Intact  Standing: Intact; With support  Ambulation/Gait Training:  Distance (ft): 100 Feet (ft)  Assistive Device: Gait belt;Walker, rolling  Ambulation - Level of Assistance: Contact guard assistance   Gait Description (WDL): Exceptions to WDL  Gait Abnormalities: Decreased step clearance; Antalgic; Step to gait   Left Side Weight Bearing: As tolerated  Base of Support: Shift to right  Stance: Left decreased;Right increased  Speed/Nuris: Slow  Step Length: Left shortened;Right shortened  Pain:  Pain level pre-treatment: 9/10   Pain level post-treatment: 5/10   Pain Intervention(s) : Medication (see MAR); Rest, Ice, Repositioning  Response to intervention: Nurse notified, See doc flow    Activity Tolerance:   Good  Please refer to the flowsheet for vital signs taken during this treatment. After treatment:   [x]         Patient left in no apparent distress sitting up in chair  []         Patient left in no apparent distress in bed  [x]         Call bell left within reach  [x]         Nursing notified  []         Caregiver present  []         Bed alarm activated  []         SCDs applied    COMMUNICATION/EDUCATION:   [x]         Role of Physical Therapy in the acute care setting. [x]         Fall prevention education was provided and the patient/caregiver indicated understanding.   [x]         Patient/family have participated as able in goal setting and plan of care. []         Patient/family agree to work toward stated goals and plan of care. []         Patient understands intent and goals of therapy, but is neutral about his/her participation. []         Patient is unable to participate in goal setting/plan of care: ongoing with therapy staff.  []         Other:     Thank you for this referral.  Sabi Hernandez   Time Calculation: 33 mins      Eval Complexity: History: MEDIUM  Complexity : 1-2 comorbidities / personal factors will impact the outcome/ POC Exam:LOW Complexity : 1-2 Standardized tests and measures addressing body structure, function, activity limitation and / or participation in recreation  Presentation: LOW Complexity : Stable, uncomplicated  Clinical Decision Making:Low Complexity    Overall Complexity:LOW

## 2022-06-29 NOTE — PERIOP NOTES
Reviewed PTA medication list with patient/caregiver and patient/caregiver denies any additional medications. Patient admits to having a responsible adult care for them at home for at least 24 hours after surgery. Patient encouraged to use gown warming system and informed that using said warming gown to regulate body temperature prior to a procedure has been shown to help reduce the risks of blood clots and infection. Patient's pharmacy of choice verified and documented in PTA medication section. Dual skin assessment & fall risk band verification completed with Tiffany CARDONA.

## 2022-06-29 NOTE — INTERVAL H&P NOTE
Update History & Physical    The Patient's History and Physical was reviewed with the patient and I examined the patient. There was no change. The surgical site was confirmed by the patient and me. Plan:  The risk, benefits, expected outcome, and alternative to the recommended procedure have been discussed with the patient. Patient understands and wants to proceed with the procedure.     Electronically signed by Oleksandr Prado MD on 6/29/2022 at 7:37 AM

## 2022-06-30 ENCOUNTER — HOME HEALTH ADMISSION (OUTPATIENT)
Dept: HOME HEALTH SERVICES | Facility: HOME HEALTH | Age: 52
End: 2022-06-30
Payer: COMMERCIAL

## 2022-06-30 VITALS
SYSTOLIC BLOOD PRESSURE: 105 MMHG | RESPIRATION RATE: 18 BRPM | OXYGEN SATURATION: 97 % | DIASTOLIC BLOOD PRESSURE: 60 MMHG | BODY MASS INDEX: 39.12 KG/M2 | HEART RATE: 69 BPM | TEMPERATURE: 97.7 F | WEIGHT: 243.4 LBS | HEIGHT: 66 IN

## 2022-06-30 PROCEDURE — 74011250637 HC RX REV CODE- 250/637: Performed by: PHYSICIAN ASSISTANT

## 2022-06-30 PROCEDURE — 74011000250 HC RX REV CODE- 250: Performed by: PHYSICIAN ASSISTANT

## 2022-06-30 PROCEDURE — 97116 GAIT TRAINING THERAPY: CPT

## 2022-06-30 PROCEDURE — 74011250636 HC RX REV CODE- 250/636: Performed by: PHYSICIAN ASSISTANT

## 2022-06-30 PROCEDURE — 97530 THERAPEUTIC ACTIVITIES: CPT

## 2022-06-30 RX ADMIN — KETOROLAC TROMETHAMINE 15 MG: 30 INJECTION, SOLUTION INTRAMUSCULAR at 05:54

## 2022-06-30 RX ADMIN — PREGABALIN 150 MG: 75 CAPSULE ORAL at 08:13

## 2022-06-30 RX ADMIN — METOPROLOL SUCCINATE 150 MG: 100 TABLET, EXTENDED RELEASE ORAL at 08:13

## 2022-06-30 RX ADMIN — FERROUS SULFATE TAB 325 MG (65 MG ELEMENTAL FE) 325 MG: 325 (65 FE) TAB at 08:13

## 2022-06-30 RX ADMIN — OXYCODONE 5 MG: 5 TABLET ORAL at 09:03

## 2022-06-30 RX ADMIN — DULOXETINE HYDROCHLORIDE 60 MG: 60 CAPSULE, DELAYED RELEASE ORAL at 08:13

## 2022-06-30 RX ADMIN — ACETAMINOPHEN 1000 MG: 500 TABLET ORAL at 05:54

## 2022-06-30 RX ADMIN — CEFAZOLIN 2 G: 10 INJECTION, POWDER, FOR SOLUTION INTRAVENOUS at 02:35

## 2022-06-30 RX ADMIN — ASPIRIN 81 MG: 81 TABLET, COATED ORAL at 08:13

## 2022-06-30 RX ADMIN — DOCUSATE SODIUM 50 MG AND SENNOSIDES 8.6 MG 1 TABLET: 8.6; 5 TABLET, FILM COATED ORAL at 08:13

## 2022-06-30 RX ADMIN — SODIUM CHLORIDE, PRESERVATIVE FREE 10 ML: 5 INJECTION INTRAVENOUS at 05:54

## 2022-06-30 NOTE — PROGRESS NOTES
Verbal and Bedside change of shift report given to Araseli Peralta RN by Saqib Horton RN. Opportunity for questions were provided. 0258  Assumed care of patient. Assessment complete at this time. Pt alert and oriented x 4. Lungs clear bilaterally on room air. Patient denies shortness of breath/chest pain. No numbness and tingling in all extremities. 18 G IV to L Hand dressing clean, dry, & intact. Stated pain 4/10. SCD's/ Adilson hose in place. ACE Wrap dressing to LLE that is clean, dry, & intact. Incentive spirometer at bedside. Patient taught how to use breathing tool w/ proper demonstration. Instructed to use 10x Q1. Verbalized understanding. Last BM 06/28/2022. Patient oriented to room and call bell. Call bell within reach. Bed in lowest position. [id number]  PRN 5 mg Oral Roxicodone given preemptively prior to physical therapy.

## 2022-06-30 NOTE — PROGRESS NOTES
Problem: Mobility Impaired (Adult and Pediatric)  Goal: *Acute Goals and Plan of Care (Insert Text)  Description: Goals to be addressed in 1-3 days:  1. Supine to sit and sit to supine SBA with HR for meals. 2. Sit to stand and stand to sit SBA/CGA with RW in prep for ambulation. 3. Ambulate 100ft SBA/CGA with RW, WBAT, for home/community mobility. 4. Ascend/descend a 3 stair steps CGA/Yunior without HR for home entry. Note: [x]  Patient has met MD [facility] for d/c home   []  Recommend HH with 24 hour adult care   []  Benefit from additional acute PT session to address:      PHYSICAL THERAPY TREATMENT    Patient: Dereck Subramanian (25 y.o. female)  Date: 6/30/2022  Diagnosis: Total knee replacement status, left [Z96.652] <principal problem not specified>  Procedure(s) (LRB):  LEFT TOTAL KNEE ARTHROPLASTY AND ALL INDICATED PROCEDURES (Left) 1 Day Post-Op  Precautions: Fall,WBAT    ASSESSMENT:  Pt progressing well with mobility. Pt transitioned to EOB with independence. PT performed stand transfers with supervision. Pt ambulated in hallway with RW and supervision, 200 ft with RW. No LOB or unsteadiness noted during session. Pt negotiated stairs with handrail and CGA. Reviewed home safety, activity recommendations and pacing, icing, positioning, elevation, and home exercise program. Pt has no further questions or concerns at this time. Recommend transition to home with home health PT services to continue mobility progression. Progression toward goals:   []      Improving appropriately and progressing toward goals  []      Improving slowly and progressing toward goals  []      Not making progress toward goals and plan of care will be adjusted     PLAN:  Patient continues to benefit from skilled intervention to address the above impairments. Continue treatment per established plan of care.   Discharge Recommendations:  Home Health  Further Equipment Recommendations for Discharge:  N/A     SUBJECTIVE: Patient stated I am feeling good.     OBJECTIVE DATA SUMMARY:   Critical Behavior:  Neurologic State: Alert,Appropriate for age  Orientation Level: Appropriate for age,Oriented X4  Cognition: Appropriate decision making,Appropriate for age attention/concentration,Appropriate safety awareness,Follows commands  Safety/Judgement: Fall prevention  Functional Mobility Training:  Bed Mobility:   Supine to Sit: Modified independent  Transfers:  Sit to Stand: Supervision  Stand to Sit: Supervision  Balance:  Sitting: Intact  Standing: Intact; With support   Ambulation/Gait Training:  Distance (ft): 200 Feet (ft)  Assistive Device: Gait belt;Walker, rolling  Ambulation - Level of Assistance: Supervision  Gait Abnormalities: Decreased step clearance  Left Side Weight Bearing: As tolerated  Base of Support: Shift to right  Stance: Left decreased  Speed/Nuris: Slow  Step Length: Right shortened;Left shortened  Pain:  Pain level pre-treatment: 5/10  Pain level post-treatment: 5/10   Pain Intervention(s): Medication (see MAR); Rest, Ice, Repositioning   Response to intervention: Nurse notified, See doc flow    Activity Tolerance:   Good  Please refer to the flowsheet for vital signs taken during this treatment. After treatment:   [x] Patient left in no apparent distress sitting up in chair  [] Patient left in no apparent distress in bed  [x] Call bell left within reach  [x] Nursing notified  [] Caregiver present  [] Bed alarm activated  [] SCDs applied      COMMUNICATION/EDUCATION:   [x]         Role of Physical Therapy in the acute care setting. [x]         Fall prevention education was provided and the patient/caregiver indicated understanding. [x]         Patient/family have participated as able in working toward goals and plan of care. []         Patient/family agree to work toward stated goals and plan of care.   []         Patient understands intent and goals of therapy, but is neutral about his/her participation.   []         Patient is unable to participate in stated goals/plan of care: ongoing with therapy staff.  []         Other:        Franchesca Hernandez   Time Calculation: 29 mins

## 2022-06-30 NOTE — PROGRESS NOTES
1010  AVS review with pt, opportunity for questions and concerns at this time. D/c IV clean and dry. Properly discarded armbands.

## 2022-06-30 NOTE — ROUTINE PROCESS
Bedside and Verbal shift change report given to Lang Plaza RN by Maricarmen Archibald RN. Report included the following information SBAR, Kardex, Intake/Output and MAR.

## 2022-07-01 ENCOUNTER — HOME CARE VISIT (OUTPATIENT)
Dept: SCHEDULING | Facility: HOME HEALTH | Age: 52
End: 2022-07-01
Payer: COMMERCIAL

## 2022-07-01 VITALS
TEMPERATURE: 97.8 F | DIASTOLIC BLOOD PRESSURE: 80 MMHG | RESPIRATION RATE: 18 BRPM | OXYGEN SATURATION: 97 % | HEART RATE: 87 BPM | SYSTOLIC BLOOD PRESSURE: 120 MMHG

## 2022-07-01 PROCEDURE — A6213 FOAM DRG >16<=48 SQ IN W/BDR: HCPCS

## 2022-07-01 PROCEDURE — G0151 HHCP-SERV OF PT,EA 15 MIN: HCPCS

## 2022-07-01 PROCEDURE — 400013 HH SOC

## 2022-07-01 NOTE — HOME HEALTH
Skilled services/Home bound verification:     Skilled Reason for admission/summary of clinical condition:  Patient who underwent L TKR by Tatiana Stokes    This patient is homebound for the following reasons Requires considerable and taxing effort to leave the home . Caregiver: spouse Sadi Norrsi Caregiver assists with IADL's. Medications reconciled and all medications are available in the home this visit. High risk medication teaching regarding anticoagulants, hyperglycemic agents or opiod narcotics performed (specify)  percocet for risk of overdose    Dr. Debbie West notified of any discrepancies/look a like medications/medication interactions no severe interaction noted . Home health supplies by type and quantity ordered/delivered this visit include: mepilex dressing     Patient education provided this visit to include: HEP, fall prevention, dc planning . Patient level of understanding of education provided: Patient was able to partially teach back HEP     Sharps Education Provided: n/a     Patient response to procedure performed:  Patient was able to tolerate HEP without adverse effects     Home exercise program/Homework provided: patient was educated with HEP including supine ankle pumps, ankle circles, quad sets, hamstring sets, heel slides and gluteal sets x 20 reps x 3 sets daily to improve ROM and MMT on L knee to improve gait and transfers followed by ice x 20 minutes at a time on L knee to decrease pain and swelling. Patient educated with fall prevention technique by decluttering space, proper use of AD and footwear    Pt/Caregiver instructed on plan of care and are agreeable to plan of care at this time. Physician Dr. Debbie West notified of patient admission to home health and plan of care including anticipated frequency of 2w1 3w2      Discharge planning discussed with patient and caregiver.   Discharge planning as follows: dc to family with MD supervision when all goals are met . Pt/Caregiver did verbalize understanding of discharge planning. Next MD appointment TBD  Patient/caregiver encouraged/instructed to keep appointment as lack of follow through with physician appointment could result in discontinuation of home care services for non-compliance. PMHx: Allergic rhinitis      Arthritis        osteoarthirtis    Asthma      Autoimmune disease ([facility] [address]        Psoriatic arthritis    Autoimmune disease ([facility] [address]        fibromalgia    Fibromyalgia      GERD (gastroesophageal reflux disease)      Ill-defined condition        Mitral valve prolapse    Migraine        pt denies 2021    MVP (mitral valve prolapse)      Nausea &  vomiting        nausea, vomiting had success with patch    PVC (premature ventricular contraction)        PVC deloris valve prolapse        S: pain on L knee 5/10 that is managed by pain medication as needed, ice x 20 minutes at a time and elevation of LLE   O:Patients Goals= Be able to go back to PLOF painfree  Wound/Incision: location, description, drainage: has intact no removable surgical dressing that did not have any drainage present. As of 7/6/22 from SN visit. changed dressing  PLOF: Patient with family in a 2 level house with >10 steps to enter main floor. prior to surgery, she was independent with ADL's and IADL's with difficulty due to chronic pain on L knee and did not use any AD for gait and was able to drive   STRENGTH L knee flexor 2+/5, L knee extensor 3-5. RLE wfl  ROM R knee 8-76  BALANCE Tinetti 11/28  high fall risk due to reduced strength on LLE, gait deviation and decreased efficiency of balance reactions. Patient demonstrated poor use of hip and ankle strategy during standing balance activity. Patient requires support from AD at all times for safety and stability.   GAIT Patient noted he ambulated with RW x 30 feet with supervision with noted decreased step height and stride length on LLE and with noted antalgic gait.  BED MOBILITY Patient needed Min A x 1 to lift RLE in and out of bed   TRANSFERS Patient needed Min A x1  to and from bed, chair and toilet using RW  A: PT evaluation completed, POC established, Med rec done, HEP established  P:Home Safety eval/recommendations: Home health physical therapy initial evaluation performed. Patient demonstrates decreased strength, balance, and endurance which increases patient's overall fall risk and burden of care. Patient would benefit from home health physical therapy to improve balance, strength, and endurance which would decrease fall risk and allow patient to return to prior level of function once all functional goals or full rehab potential is met. patient was educated with HEP including supine ankle pumps, ankle circles, quad sets, hamstring sets, heel slides and gluteal sets x 20 reps x 3 sets daily to improve ROM and MMT on L knee to improve gait and transfers followed by ice x 20 minutes at a time on L knee to decrease pain and swelling.  Patient educated with fall prevention technique by decluttering space, proper use of AD and footwear

## 2022-07-02 ENCOUNTER — HOME CARE VISIT (OUTPATIENT)
Dept: SCHEDULING | Facility: HOME HEALTH | Age: 52
End: 2022-07-02
Payer: COMMERCIAL

## 2022-07-02 PROCEDURE — G0299 HHS/HOSPICE OF RN EA 15 MIN: HCPCS

## 2022-07-02 PROCEDURE — G0157 HHC PT ASSISTANT EA 15: HCPCS

## 2022-07-03 ENCOUNTER — HOME CARE VISIT (OUTPATIENT)
Dept: HOME HEALTH SERVICES | Facility: HOME HEALTH | Age: 52
End: 2022-07-03
Payer: COMMERCIAL

## 2022-07-03 VITALS
RESPIRATION RATE: 16 BRPM | TEMPERATURE: 97 F | SYSTOLIC BLOOD PRESSURE: 152 MMHG | OXYGEN SATURATION: 94 % | DIASTOLIC BLOOD PRESSURE: 88 MMHG | HEART RATE: 68 BPM

## 2022-07-03 NOTE — HOME HEALTH
Summary of clinical health condition:  44 y/o female with L knee OA underwent for L TKR  6/29/22 done by Dr. Juan Miguel Madera. Medications reviewed and all medications are available at home. The following education was provided regarding medications, medication interactions, and look a like medications: N/A, all meds reviewed. Discussed importance of timely taking all prescribed meds, proper dosage and freq. Reviewed Oxycodone S/E: constipation, drowsiness, dizziness, lightheaded ness, N/V. Taking pain medication on timely basis to achieve good pain relief. To call MD with uncontrollable pain. Medications are somewhat effective at this time. Caregiver: CG/daughter,  who assist with ADL's, assist with daily medications, prepare daily meals, run errands, groceries and accompanied to MD appt. prn   Skilled care provided: Teaching disease and medication management, Completed assessment, L knee Mepilex surgical dressing intact, no drainage, min swelling around the site and no redness. Completed initial eval, discussed POC, SNV freq and D/C plan to pt/CG with god understanding. Patient education provided this visit to include: Reviewed hand washing or using alcohol hand , and avoiding sick person. Continue daily exercises per PT recommendation, monitor wound with increased drainage 60-65%, wound for s/s of infection; redness, drainage, swelling, drainage, keeping surgical wound dry and clean, may shower and avoid removing surgical  dressing. Avoid bending and heavy lifting, continue cold compress 15-20 min q 1hr to decrease swelling. Reapply ARIANNA hose in AM and removing at night. Safety and fall prec; safety transfer and placing walker in easy access. Energy conservation, rest in bet activity and deep breathing exercises. Continue ADA and  heart healthy diet, increasing protein intake, avoid skipping meals and good hydration.  Reviewed S/S of infection; fever 100.4, increase pain, redness, swelling, purulent wound drainage with foul smell, cloudy/urine with foul smell, not feeling well 2-3 days, SOB, and to call [facility] or MD for assistance if experiencing any of these S/S. To call 911 with chest pains, facial drooping, difficulty talking, non arousable/unconscious and uncontrollable bleeding. Patient/caregiver degree of understanding: Pt has good understanding of the teaching provided during visit. Home health supplies by type and quantity ordered/delivered this visit include: None    Pt./CG instructed on plan of care, SN freq visit 1d1, 1w2, 2 prn prn and D/C plan. .   Home exercise program/Homework provided: Ambulation, HEP deep breathing exercises 10x when having SOB, pain and anxiety, IS 10x q 1-2 hrs. Discharge planning discussed with patient and caregiver. Discharge planning as follows: Pt/CG will be able to manage disease and medication independently, pt reach her maximum level of function, L TKR surgical epithelialized 100%  and health condition stable. Pt/Caregiver did verbalize understanding of discharge planning. Patient/caregiver encouraged/instructed to keep appointment as lack of follow through with physician appointment could result in discontinuation of home care services for non-compliance. COVID - 23 Screening completed before visit:     Denies and no family member  has any of these S/S:  Fever, dry cough, chills sore throat diarrhea, body aches, not feeling well and loss of taste. Initial

## 2022-07-04 ENCOUNTER — HOME CARE VISIT (OUTPATIENT)
Dept: SCHEDULING | Facility: HOME HEALTH | Age: 52
End: 2022-07-04
Payer: COMMERCIAL

## 2022-07-04 VITALS
TEMPERATURE: 97.4 F | SYSTOLIC BLOOD PRESSURE: 150 MMHG | DIASTOLIC BLOOD PRESSURE: 96 MMHG | OXYGEN SATURATION: 97 % | HEART RATE: 79 BPM

## 2022-07-04 PROCEDURE — G0157 HHC PT ASSISTANT EA 15: HCPCS

## 2022-07-04 NOTE — HOME HEALTH
SUBJECTIVE:  My house is crazy  CAREGIVER INVOLVEMENT/ASSISTANCE NEEDED FOR:  Aida Martinez, , assist with cooking  [id number] ORDERED/DELIVERED THIS VISIT INCLUDE: none  OBJECTIVE:  See interventions. ASSESSMENT OF PROGRESS TOWARD GOALS: Pt performed ther-ex fairly, pain increased with knee flexion. PATIENT RESPONSE TO TREATMENT:  Pt emmanuelle treatment fairly with pain increasing from 3 to 5. Resolved within 2 min. PATIENT LEVEL OF UNDERSTANDING OF EDUCATION PROVIDED: Pt educated on HEP to perform daily X 3  CONTINUED NEED FOR THE FOLLOWING SKILLS: CONT PT to address MMT, ROM, transfers, bed mob, gait training, stair training and other goals  PLAN FOR NEXT VISIT: Progress with ther-ex and transfer training from bed  THE FOLLOWING DISCHARGE PLANNING WAS DISCUSSED WITH THE PATIENT/CAREGIVER:  CONT PT 3k1, 2wk1.   DC when goals met

## 2022-07-05 ENCOUNTER — HOME CARE VISIT (OUTPATIENT)
Dept: SCHEDULING | Facility: HOME HEALTH | Age: 52
End: 2022-07-05
Payer: COMMERCIAL

## 2022-07-05 VITALS
DIASTOLIC BLOOD PRESSURE: 78 MMHG | TEMPERATURE: 98.1 F | SYSTOLIC BLOOD PRESSURE: 126 MMHG | RESPIRATION RATE: 14 BRPM | OXYGEN SATURATION: 95 % | HEART RATE: 68 BPM

## 2022-07-05 VITALS
DIASTOLIC BLOOD PRESSURE: 62 MMHG | OXYGEN SATURATION: 97 % | SYSTOLIC BLOOD PRESSURE: 122 MMHG | HEART RATE: 68 BPM | TEMPERATURE: 97.3 F

## 2022-07-05 PROCEDURE — G0157 HHC PT ASSISTANT EA 15: HCPCS

## 2022-07-05 NOTE — HOME HEALTH
SUBJECTIVE:   I was worn out after last session  CAREGIVER INVOLVEMENT/ASSISTANCE NEEDED FOR:  Quinten Orozco, , assist with [facility] ORDERED/DELIVERED THIS VISIT INCLUDE: none  OBJECTIVE:  See interventions. ASSESSMENT OF PROGRESS TOWARD GOALS: Pt progressing towards bed mob and transfers with VC for foot placement and head over toes. PATIENT RESPONSE TO TREATMENT:  Pt fatigued with ther-ex and gait training. PATIENT LEVEL OF UNDERSTANDING OF EDUCATION PROVIDED: Pt educated on pre-cane movements  CONTINUED NEED FOR THE FOLLOWING SKILLS: CONT PT to address MMT, ROM, transfers, bed mob, gait training, stair training and other goals  PLAN FOR NEXT VISIT: Progress to [facility] when able  THE FOLLOWING DISCHARGE PLANNING WAS DISCUSSED WITH THE PATIENT/CAREGIVER:  CONT PT 2k1, 3wk1.   DC when goals met

## 2022-07-06 ENCOUNTER — HOME CARE VISIT (OUTPATIENT)
Dept: SCHEDULING | Facility: HOME HEALTH | Age: 52
End: 2022-07-06
Payer: COMMERCIAL

## 2022-07-06 VITALS
DIASTOLIC BLOOD PRESSURE: 78 MMHG | HEART RATE: 107 BPM | RESPIRATION RATE: 16 BRPM | SYSTOLIC BLOOD PRESSURE: 122 MMHG | OXYGEN SATURATION: 98 %

## 2022-07-06 PROCEDURE — G0299 HHS/HOSPICE OF RN EA 15 MIN: HCPCS

## 2022-07-06 NOTE — HOME HEALTH
Subjective: Patient relays that she has been keeping up on her pain medication when awake but does not set alarms to awaken her at night to take medication. She has been sleeping downstairs as she was unsure when she could return to navigating stairs. She also complains of continued ankle weakness that persists after other lower extremity surgeries that make her feel uncertain at times when attempting to ambulate. Objective: Skilled home health physical therapy interventions completed today listed in care plan section. Interventions performed today to assist in return to prior level of function, address deficits as apparent upon time of initial evaluation, return to community and personal activities, and progress further towards goals as previously established in plan of care. There are not any changes to medications upon timing of this visit. Home health supplies were not ordered/delivered today. Visual inspection finds dressing intact with no noted drainage into dressing. Assessment:  Patient is progressing towards goals as previously established in POC with skilled home health physical therapy services at this time as made apparent by improving overall knee active range of motion. She was able to effectively manage single point cane on stairs and displayed mild fatigue upon completion of full flight of stairs. Patient unable to perform bilateral heel raises plantar flexion in standing due to lower extremity weakness today. Family/caregiver spouse and daughters are present/set-up at this point in time and assists with meals, transport, ADLs as necessary and transfers if needed. Patient able to safely perform all bed mobility today modified independent with no cueing/training needed for corrections. No noted signs/symptoms of post surgical infection during visit today.     Plan:  Discharge planning discussed at this visit regarding eventual discharge once patient has met goals and/or met maximum benefit from home health skilled PT services with patient understanding and agreeable at this time. Continued need for skilled home health PT at this time to address deficits, reduce risk of falls, and obtain goals as previously established per plan of care. Inquire as to HEP completion and if patient attempt stair navigation again with caregiver assistance upon next visit. Attempt outside ambulation if weather permits.

## 2022-07-06 NOTE — HOME HEALTH
Skilled reason for visit: incision check, education regarding knee replacment     Caregiver involvement: has family that will help with any needs . Medications reviewed and all medications are available in the home this visit. The following education was provided regarding medications:  meds reviewed , all in home and patient takes as directed . MD notified of any discrepancies/look a-like medications/medication interactions: none  Medications are reconciled  at this time.       Home health supplies by type and quantity ordered/delivered this visit include: none    Patient education provided this visit: SN educated on pain control, using ice and making sure that she is walking and doing exercises     Sharps education provided: NA    Patient level of understanding of education provided: patient verbalized unserstanding of education    Skilled Care Performed this visit: education regaridng knee replacement     Patient response to procedure performed:  no procedure preformed     Patient's Progress towards personal goals: patient stated that she is doing ok     Home exercise program: breathing techqnues     Continued need for the following skills: Nursing and Physical Therapy    Plan for next visit: education    Patient and/or caregiver notified and agrees to changes in the Plan of Care N/A      The following discharge planning was discussed with the pt/caregiver: when goals met and education is complete

## 2022-07-07 ENCOUNTER — HOME CARE VISIT (OUTPATIENT)
Dept: SCHEDULING | Facility: HOME HEALTH | Age: 52
End: 2022-07-07
Payer: COMMERCIAL

## 2022-07-07 VITALS
SYSTOLIC BLOOD PRESSURE: 118 MMHG | OXYGEN SATURATION: 97 % | DIASTOLIC BLOOD PRESSURE: 76 MMHG | RESPIRATION RATE: 14 BRPM | TEMPERATURE: 97.6 F | HEART RATE: 76 BPM

## 2022-07-07 PROCEDURE — G0157 HHC PT ASSISTANT EA 15: HCPCS

## 2022-07-08 NOTE — HOME HEALTH
Subjective: Patient relays that she had \"an off day yesterday\" and had some hypersensitivity lower left leg. She states that she tried to follow the handout of her HEP but needs to review the form with the pillow exercises. Objective: Skilled home health physical therapy interventions completed today listed in care plan section. Interventions performed today to assist in return to prior level of function, address deficits as apparent upon time of initial evaluation, return to community and personal activities, and progress further towards goals as previously established in plan of care. There are not any changes to medications upon timing of this visit. Home health supplies were not ordered/delivered today. Visual inspection finds dressing intact with no noted drainage into dressing. Trained in self desensitization using washcloth. Assessment:  Patient is progressing towards goals as previously established in POC with skilled home health physical therapy services at this time as made apparent by improving overall knee active range of motion. Mild fatigue noted status post outside ambulation attempt. Patient able to complete pillow exercises well during treatment today and patient took additional notes on handout for form to better remember overall form. Family/caregiver spouse and adult children involvement is present/set-up at this point in time and assists with meals, transport, ADLs as necessary, transfers if needed. No notes signs/symptoms of post surgical infection during treatment completion today. Plan:  Discharge planning discussed at this visit regarding eventual discharge once patient has met goals and/or met maximum benefit from home health skilled PT services with patient understanding and agreeable at this time. Continued need for skilled home health PT at this time to address deficits, reduce risk of falls, and obtain goals as previously established per plan of care.  Continue with outside ambulation and maximize knee active range of motion.

## 2022-07-10 ENCOUNTER — HOME CARE VISIT (OUTPATIENT)
Dept: SCHEDULING | Facility: HOME HEALTH | Age: 52
End: 2022-07-10
Payer: COMMERCIAL

## 2022-07-10 PROCEDURE — G0157 HHC PT ASSISTANT EA 15: HCPCS

## 2022-07-11 ENCOUNTER — HOME CARE VISIT (OUTPATIENT)
Dept: HOME HEALTH SERVICES | Facility: HOME HEALTH | Age: 52
End: 2022-07-11
Payer: COMMERCIAL

## 2022-07-11 VITALS
HEART RATE: 106 BPM | TEMPERATURE: 98 F | SYSTOLIC BLOOD PRESSURE: 122 MMHG | OXYGEN SATURATION: 97 % | RESPIRATION RATE: 13 BRPM | DIASTOLIC BLOOD PRESSURE: 78 MMHG

## 2022-07-12 NOTE — HOME HEALTH
Subjective: Patient states that she has felt better overall the past couple days. She has been able to wash her hair and even felt good enough to use her reacher to  around her home some. She complains of mild fatigue today. Reported discomfort at pes anserine moving into knee flexion end of range active range of motion. Objective: Skilled home health physical therapy interventions completed today listed in care plan section. Interventions performed today to assist in return to prior level of function, address deficits as apparent upon time of initial evaluation, return to community and personal activities, and progress further towards goals as previously established in plan of care. There are not any changes to medications upon timing of this visit. Home health supplies were not ordered/delivered today. Visual inspection finds dressing intact with no noted drainage into dressing. Encouraged use of single point cane short distances inside home when not fatigued or off balance. Assessment:  Patient is progressing towards goals as previously established in POC with skilled home health physical therapy services at this time as made apparent by ability to complete heel raises today as well as improving bed mobility and transfer ability. Family/caregiver spouse and adult children involvement are present/set-up at this point in time and assists with meals, transport, ADLs as necessary and general encouragement. Was able to reduce pain reports at pes anserine moving into end of range active knee flexion status post hip adductor stretch. Was able to ambulate around home safely with single point cane after training for proper form and use. Plan:  Discharge planning discussed at this visit regarding eventual discharge once patient has met goals and/or met maximum benefit from home health skilled PT services with patient understanding and agreeable at this time.  Continued need for skilled home health PT at this time to address deficits, reduce risk of falls, and obtain goals as previously established per plan of care. Continue to monitor symptoms. Patient to attempt sleeping upstairs this evening, monitor response to trying to return to regular sleeping quarters as well as stair navigation. Progress use of single point cane for mobility as tolerated. Upcoming discharge later this week.

## 2022-07-13 ENCOUNTER — HOME CARE VISIT (OUTPATIENT)
Dept: HOME HEALTH SERVICES | Facility: HOME HEALTH | Age: 52
End: 2022-07-13
Payer: COMMERCIAL

## 2022-07-13 ENCOUNTER — HOME CARE VISIT (OUTPATIENT)
Dept: SCHEDULING | Facility: HOME HEALTH | Age: 52
End: 2022-07-13
Payer: COMMERCIAL

## 2022-07-13 VITALS
OXYGEN SATURATION: 96 % | SYSTOLIC BLOOD PRESSURE: 125 MMHG | RESPIRATION RATE: 16 BRPM | TEMPERATURE: 97.1 F | HEART RATE: 87 BPM | DIASTOLIC BLOOD PRESSURE: 84 MMHG

## 2022-07-13 PROCEDURE — G0299 HHS/HOSPICE OF RN EA 15 MIN: HCPCS

## 2022-07-13 PROCEDURE — G0157 HHC PT ASSISTANT EA 15: HCPCS

## 2022-07-13 NOTE — HOME HEALTH
Skilled reason for visit: Patient was recently hospitalized for L TKR , requiring observation by a SN for s/s of decomposition or adverse effects resulting from newly prescribed medications. Skilled observation needed to determine if new medication regimen prescribed requires modifications or other therapeutic interventions considered until pt's clinical condition or treatment has stabilized. Caregiver involvement:  Patient's caregiver is her family. Caregiver assists patient with bathing, dressing, walking, bathroom, meal prep and setup, medication management, grocery shopping, household chores, transportation to MD appointment and home exercise program.  Medications reconciled and all medications are available in the home this visit. The following education was provided regarding medications, medication interactions, and look alike modifications. oxyCODONE-acetaminophen (PERCOCET) 5-325 mg per tablet         Contact Agency or MD with questions. Medications are effective at this time. Patient states understanding. Patient education provided this visit:  . X. Heddy  Expert Planetdy  Expert Planetdy  Disease Management: L TKR teaching, pain management, constipation prevention, fall precautions, s/s of infection, deep breathing exercises. Patient level of understanding of education provided: Pt verbalized understanding of all education and repeated back teaching   Skilled Care Performed this visit: Disease process teaching, medication teaching, physical assessment and monitoring  Patient response to procedure performed:  NA  Sharps Education Provided: NA  Goals/teaching progressing. Patient's goal is to return to work. Progressing toward goals. Patient has remained free from falls, free from infection; no safety concerns at this time and is ambulating independently with walker.   SN to complete education of patient and patient to follow up with any further questions or concerns with Dr Tara Macias exercise program: PT  Continued need for the following skills: Nursing, PT   Patient and/or caregiver notified and agree to changes in the Plan of Care: YES - Patient notified of SN discharge on next SN visit. The following discharge planning was discussed with the pt/caregiver:  SN to continue education of patient and discharge patient when teaching and goals are met.

## 2022-07-14 ENCOUNTER — HOME CARE VISIT (OUTPATIENT)
Dept: SCHEDULING | Facility: HOME HEALTH | Age: 52
End: 2022-07-14
Payer: COMMERCIAL

## 2022-07-14 VITALS
OXYGEN SATURATION: 97 % | TEMPERATURE: 97.1 F | DIASTOLIC BLOOD PRESSURE: 70 MMHG | HEART RATE: 82 BPM | RESPIRATION RATE: 18 BRPM | SYSTOLIC BLOOD PRESSURE: 130 MMHG

## 2022-07-14 PROCEDURE — G0151 HHCP-SERV OF PT,EA 15 MIN: HCPCS

## 2022-07-15 VITALS
SYSTOLIC BLOOD PRESSURE: 144 MMHG | TEMPERATURE: 97.7 F | OXYGEN SATURATION: 96 % | HEART RATE: 79 BPM | DIASTOLIC BLOOD PRESSURE: 100 MMHG | RESPIRATION RATE: 16 BRPM

## 2022-07-15 NOTE — CASE COMMUNICATION
Summary of progress: Gait: Outside patient likes to utilize her FWW and utilizes a cane within home or her own yard ad esvin Mod I-from  Bed mobility: Independent from Min A, Transfers: sit to and from stand Independent from [address] A, AROM L knee flexion from  76 degrees to 114 degrees and L knee extension from -6 degrees to 0 degrees Stair mobility Mod I with cane and rail for support-she reports she is back to sleeping upstairs- Mod I Kadi tti test score increased from 11/28 to 24/28 from a high fall risk category to a low fall risk category  Patient plans to discuss going to  outpatient therapy with return visit to Surgeon. Patient is in agreement with discharge.

## 2022-07-15 NOTE — HOME HEALTH
SUBJECTIVE intermittent pain on L knee 1/10 that is managed by rest   CAREGIVER ASSISTANCE:daughters are able to assist as needed   MEDICATIONS REVIEWED AND UPDATED: no changes in medications at this time  WOUND L knee intact non removable dressing   ROM L knee 0-116 during dc   BED MOBILITY independent upon dc from [address] upon dc, patient was able to demo independence with transfers with [facility] San[facility] Lundy[facility]ess[facility] A using RW  during eval   GAIT TRAINING patient was seen ambulating with SPC upon dc from [facility] Yunior and RW from eval   STAIRS patient is supervision with stair negotation from unable during eval   THERAPEUTIC EXERCISES independent   BALANCE Tinetti 24/28 from 11/28   PATIENT/CAREGIVER EDUCATION THIS VISIT: fall prevention, safety, need for assistance as needed for transfers and gait  ASSESSMENT: patient provided with skilled PT intervention consisting of graded therapeutic exercises, gait training, balance training, safe transfers training, caregiver education and Home exercise program education. Patient at time of discharge was able to demonstrate independence with bed mobility, transfers and gait with SPC. pt. also noted that she is doing HEP 1x a day.   EDUCATION PROVIDED to continue with HEP daily    PATIENT LEVEL OF UNDERSTANDING OF EDUCATION PROVIDED Patient verbalized understanding   PATIENT RESPONSE TO PROCEDURE PERFORMED Patient was able to demo independence with HEP   PLAN dc at this time due to goals are met   DISCHARGE PLANNING DISCUSSED: dc to self and family under MD supervision

## 2022-07-15 NOTE — HOME HEALTH
SUBJECTIVE: Patient reports she is trying to wean off of her pain meds-takes 2 at bedtime and during the night takes 2  and just 1 if needed during the day . Patient reports she has been in her tub but did not submerge her leg-family around for assist but did not need it. CAREGIVER INVOLVEMENT/ASSISTANCE NEEDED FOR: Family offers support and assistance for all aspects of care as needed  [address] ORDERED/DELIVERED THIS VISIT INCLUDE: None  OBJECTIVE:  See interventions. PATIENT RESPONSE TO TREATMENT: Good-not report  PATIENT LEVEL OF UNDERSTANDING OF EDUCATION PROVIDED: Good-all educational goals met  ASSESSMENT OF PROGRESS TOWARD GOALS:Summary of progress: Gait: Outside patient likes to utilize her FWW and utilizes a cane within home or her own yard ad esvin Mod I-from  Bed mobility: Independent from [address] [place] [address], Transfers: sit to and from stand Independent from [address] A, AROM L knee flexion from  76 degrees to 114 degrees and L knee extension from -6 degrees to 0 degrees Stair mobility Mod I with cane and rail for support-she reports she is back to sleeping upstairs- Mod I Tinetti test score increased from 11/28 to 24/28 from a high fall risk category to a low fall risk category  CONTINUED NEED FOR THE FOLLOWING SKILLS: Patient will be seen 3 x week for Physical Therapy to continue to work toward goals within POC and HHPT is medically necessary to address  dx and clinical findings: decreased ROM, decreased strength, impaired gait, decreased ability w stair negotiation, increased swelling, decreased endurance, decreased balance and decreased safety, increased pain in order to improve functional mobility/quality of life, decrease burden of care, reduce risk for re-hospitalization, work towards patient's personal goals of return to PLOF w decrease risk for falls.   PLAN FOR NEXT VISIT: Discharge Assessment with Supervising Physical therapist  THE FOLLOWING DISCHARGE PLANNING WAS DISCUSSED WITH THE PATIENT/CAREGIVER: This is visit number 7 out of  8  planned visits. Patient reports she feels good about her upcoming discharge and plans to discuss outpatient therapy with her return visit ot her Orthopedic.   NEXT MD APPT:ILANA

## 2022-07-20 ENCOUNTER — HOME CARE VISIT (OUTPATIENT)
Dept: SCHEDULING | Facility: HOME HEALTH | Age: 52
End: 2022-07-20
Payer: COMMERCIAL

## 2022-07-20 VITALS
TEMPERATURE: 97.2 F | RESPIRATION RATE: 16 BRPM | DIASTOLIC BLOOD PRESSURE: 90 MMHG | SYSTOLIC BLOOD PRESSURE: 119 MMHG | OXYGEN SATURATION: 97 % | HEART RATE: 90 BPM

## 2022-07-20 PROCEDURE — G0299 HHS/HOSPICE OF RN EA 15 MIN: HCPCS

## 2022-07-20 NOTE — HOME HEALTH
Skilled reason for visit: Patient was recently hospitalized for  L TKR requiring observation by a SN for s/s of decomposition or adverse effects resulting from newly prescribed medications. Skilled observation needed to determine if new medication regimen prescribed requires modifications or other therapeutic interventions considered until pt's clinical condition or treatment has stabilized. Caregiver involvement: Patient lives with family. Caregiver assists patient with meal prep and setup, medication management, grocery shopping, household chores, transportation to MD appointment and home exercise program.  Medications reconciled and all medications are available in the home this visit. The following education was provided regarding medications, medication interactions, and look alike medications:  Report medication questions or problems to [address] or MD.  Medications are effective currently. Patient states understanding. Patient education provided this visit:  Reconciled all meds with patient on discharge, all questions addressed and answered. Sharps education performed: NA  Patient level of understanding of education provided: Pt verbalized understanding of all education and repeated back teaching  Skilled Care Performed this visit: Disease process teaching, medication teaching, physical assessment and monitoring  Patient response to procedure performed:  No procedure performed, SN teaching visit  Progress toward goals: Goals/teaching completed. Patient to follow up with any questions or concerns with PCP  Home exercise program:   Continued need for the following skills:   Patient and/or caregiver notified and agrees to changes in the Plan of Care YES  The following discharge planning was discussed with the pt/caregiver: SN discharge with teaching and goals met.   Patient states understanding of patient's meds, precautions and interactions    SN [facility] Discharge 7/2/22 with goals met

## 2022-08-15 NOTE — ANESTHESIA POSTPROCEDURE EVALUATION
Procedure(s):  LEFT TOTAL KNEE ARTHROPLASTY AND ALL INDICATED PROCEDURES.    spinal    Anesthesia Post Evaluation      Multimodal analgesia: multimodal analgesia used between 6 hours prior to anesthesia start to PACU discharge  Patient location during evaluation: PACU  Patient participation: complete - patient participated  Level of consciousness: awake and alert  Pain score: 0  Airway patency: patent  Anesthetic complications: no  Cardiovascular status: acceptable  Respiratory status: acceptable  Hydration status: acceptable  Post anesthesia nausea and vomiting:  none  Final Post Anesthesia Temperature Assessment:  Normothermia (36.0-37.5 degrees C)      INITIAL Post-op Vital signs:   Vitals Value Taken Time   /75 06/29/22 1228   Temp 37 °C (98.6 °F) 06/29/22 1134   Pulse 77 06/29/22 1231   Resp 19 06/29/22 1231   SpO2 100 % 06/29/22 1231   Vitals shown include unvalidated device data. No

## 2024-01-11 NOTE — PROGRESS NOTES
-Well-controlled  -Continue Symbicort and albuterol PRN     Problem: Self Care Deficits Care Plan (Adult)  Goal: *Acute Goals and Plan of Care (Insert Text)  Description: Occupational Therapy Goals  Initiated 6/29/2022 within 7 day(s). 1.  Patient will perform grooming with modified independence standing at sink for 5 minutes or more. 2.  Patient will perform lower body dressing with modified independence. 3.  Patient will perform toilet transfers with modified independence. 4.  Patient will perform all aspects of toileting with modified independence. 5.  Patient will utilize energy conservation techniques during functional activities with verbal, visual, and tactile cues. OCCUPATIONAL THERAPY EVALUATION    Patient: Autumn Keith (25 y.o. female)  Date: 6/29/2022  Primary Diagnosis: Total knee replacement status, left [Z96.652]  Procedure(s) (LRB):  LEFT TOTAL KNEE ARTHROPLASTY AND ALL INDICATED PROCEDURES (Left) Day of Surgery   Precautions:   Fall,WBAT  PLOF: independent with ADLs and transfers     ASSESSMENT :  Based on the objective data described below, the patient presents with decreased strength, endurance and balance for carryover of ADLs and transfers following L TKA. Pt co-treat with PT for the need of another set of skilled hands and safety with transfers/ADLs. Pt participate with bed mobility, supine to sit, sit<>stand transfers, UB and LB dressing, toilet transfers, toileting, and grooming with CG-SBA during this session. Pt ambulate with PT in hallway for carryover of activity at home with safety. Pt was left seated in chair at the end of session in NAD. Pt education on icing, elevation and safety precautions for ADLs and transfers reviewed. Pt verbalized understanding for the same. Patient will benefit from skilled intervention to address the above impairments.   Patient's rehabilitation potential is considered to be Good  Factors which may influence rehabilitation potential include:   [x]             None noted  []             Mental ability/status  []             Medical condition  []             Home/family situation and support systems  []             Safety awareness  []             Pain tolerance/management  []             Other:      PLAN :  Recommendations and Planned Interventions:   [x]               Self Care Training                  [x]      Therapeutic Activities  [x]               Functional Mobility Training   []      Cognitive Retraining  [x]               Therapeutic Exercises           [x]      Endurance Activities  [x]               Balance Training                    []      Neuromuscular Re-Education  []               Visual/Perceptual Training     [x]      Home Safety Training  [x]               Patient Education                   [x]      Family Training/Education  []               Other (comment):    Frequency/Duration: Patient will be followed by occupational therapy 1-2 times per day/4-7 days per week to address goals. Discharge Recommendations: Home Health  Further Equipment Recommendations for Discharge: N/A     SUBJECTIVE:   Patient stated  I am doing alright.     OBJECTIVE DATA SUMMARY:     Past Medical History:   Diagnosis Date    Allergic rhinitis     Arthritis     osteoarthirtis    Asthma     Autoimmune disease ([facility] [address]     Psoriatic arthritis     Autoimmune disease ([facility] [address]     fibromalgia    Fibromyalgia     GERD (gastroesophageal reflux disease)     Ill-defined condition     Mitral valve prolapse    Migraine     pt denies 2021    MVP (mitral valve prolapse)     Nausea & vomiting     nausea, vomiting had success with patch    PVC (premature ventricular contraction)     PVC deloris valve prolapse     Past Surgical History:   Procedure Laterality Date    COLONOSCOPY N/A 10/30/2019    COLONOSCOPY, POLYPECTOMY performed by Mark Hernandez MD at [address] [place] [address]  2014    [id number] fusion    HX BREAST BIOPSY Bilateral      [address]      and endometrial ablation    HX LAP CHOLECYSTECTOMY  2017    HX ORTHOPAEDIC Bilateral     foot surgery    HX ORTHOPAEDIC Left 06/2017    bunionectomy     Barriers to Learning/Limitations: None  Compensate with: visual, verbal, tactile, kinesthetic cues/model    Home Situation:   Home Situation  Home Environment: Private residence  # Steps to Enter: 3  Rails to Enter: No  One/Two Story Residence: Two story, live on 1st floor  Living Alone: No  Support Systems: Spouse/Significant Other  Patient Expects to be Discharged to[de-identified] Home with home health  Current DME Used/Available at Home: Cane, straight,Walker, rolling  Tub or Shower Type: Tub/Shower combination  []  Right hand dominant   []  Left hand dominant    Cognitive/Behavioral Status:  Neurologic State: Alert  Orientation Level: Oriented X4  Cognition: Appropriate for age attention/concentration; Follows commands  Safety/Judgement: Fall prevention    Skin: intact  Edema: none    Vision/Perceptual:    Tracking: Able to track stimulus in all quadrants w/o difficulty    Coordination: BUE  Coordination: Within functional limits  Fine Motor Skills-Upper: Left Intact; Right Intact    Gross Motor Skills-Upper: Left Intact; Right Intact  Balance:  Sitting: Intact  Standing: Intact; With support  Strength: BUE  Strength: Generally decreased, functional  Tone & Sensation: BUE  Tone: Normal  Sensation: Intact  Range of Motion: BUE  AROM: Generally decreased, functional  Functional Mobility and Transfers for ADLs:  Bed Mobility:  Supine to Sit: Stand-by assistance  Sit to Supine: Stand-by assistance  Transfers:  Sit to Stand: Contact guard assistance  Stand to Sit: Stand-by assistance   Toilet Transfer : Contact guard assistance;Stand-by assistance  ADL Assessment:   Feeding: Independent    Oral Facial Hygiene/Grooming: Contact guard assistance;Stand-by assistance    Upper Body Dressing: Supervision    Lower Body Dressing: Contact guard assistance    Toileting: Contact guard assistance;Stand by assistance  ADL Intervention:  Grooming  Grooming Assistance: Contact guard assistance  Position Performed: Standing  Washing Hands: Contact guard assistance    Lower Body Dressing Assistance  Dressing Assistance: Contact guard assistance;Stand-by assistance  Protective Undergarmet: Contact guard assistance;Stand-by assistance  Leg Crossed Method Used: No  Position Performed: Bending forward method;Seated in chair  Cues: Sunny Alvarez  Toileting Assistance: Contact guard assistance  Bladder Hygiene: Contact guard assistance  Clothing Management: Contact guard assistance    Cognitive Retraining  Safety/Judgement: Fall prevention  Pain:  Pain level pre-treatment: 9/10   Pain level post-treatment: 7/10   Pain Intervention(s): Medication (see MAR); Rest, Ice, Repositioning   Response to intervention: Nurse notified, See doc flow    Activity Tolerance:   Good     Please refer to the flowsheet for vital signs taken during this treatment. After treatment:   [x] Patient left in no apparent distress sitting up in chair  [] Patient left in no apparent distress in bed  [x] Call bell left within reach  [x] Nursing notified  [] Caregiver present  [] Bed alarm activated    COMMUNICATION/EDUCATION:   [x] Role of Occupational Therapy in the acute care setting  [x] Home safety education was provided and the patient/caregiver indicated understanding. [x] Patient/family have participated as able in goal setting and plan of care. [x] Patient/family agree to work toward stated goals and plan of care. [] Patient understands intent and goals of therapy, but is neutral about his/her participation. [] Patient is unable to participate in goal setting and plan of care. Thank you for this referral.  Derrek Spencer OTR/L  Time Calculation: 33 mins    Eval Complexity: History: LOW Complexity : Brief history review ;    Examination: LOW Complexity : 1-3 performance deficits relating to physical, cognitive , or psychosocial skils that result in activity limitations and / or participation restrictions ;    Decision Making:LOW Complexity : No comorbidities that affect functional and no verbal or physical assistance needed to complete eval tasks

## 2025-01-07 ENCOUNTER — HOSPITAL ENCOUNTER (OUTPATIENT)
Facility: HOSPITAL | Age: 55
Setting detail: OUTPATIENT SURGERY
Discharge: HOME | End: 2025-01-07
Attending: INTERNAL MEDICINE | Admitting: INTERNAL MEDICINE
Payer: COMMERCIAL

## 2025-01-07 VITALS
HEART RATE: 78 BPM | OXYGEN SATURATION: 98 % | RESPIRATION RATE: 18 BRPM | TEMPERATURE: 98.3 F | HEIGHT: 66 IN | WEIGHT: 251.4 LBS | SYSTOLIC BLOOD PRESSURE: 114 MMHG | BODY MASS INDEX: 40.4 KG/M2 | DIASTOLIC BLOOD PRESSURE: 76 MMHG

## 2025-01-07 LAB — GLUCOSE BLD STRIP.AUTO-MCNC: 109 MG/DL (ref 70–110)

## 2025-01-07 PROCEDURE — 6360000002 HC RX W HCPCS: Performed by: INTERNAL MEDICINE

## 2025-01-07 PROCEDURE — 99152 MOD SED SAME PHYS/QHP 5/>YRS: CPT | Performed by: INTERNAL MEDICINE

## 2025-01-07 PROCEDURE — 3600007502: Performed by: INTERNAL MEDICINE

## 2025-01-07 PROCEDURE — 82962 GLUCOSE BLOOD TEST: CPT

## 2025-01-07 PROCEDURE — 2709999900 HC NON-CHARGEABLE SUPPLY: Performed by: INTERNAL MEDICINE

## 2025-01-07 PROCEDURE — 7100000010 HC PHASE II RECOVERY - FIRST 15 MIN: Performed by: INTERNAL MEDICINE

## 2025-01-07 PROCEDURE — 3600007512: Performed by: INTERNAL MEDICINE

## 2025-01-07 PROCEDURE — 7100000011 HC PHASE II RECOVERY - ADDTL 15 MIN: Performed by: INTERNAL MEDICINE

## 2025-01-07 PROCEDURE — 99153 MOD SED SAME PHYS/QHP EA: CPT | Performed by: INTERNAL MEDICINE

## 2025-01-07 PROCEDURE — 88305 TISSUE EXAM BY PATHOLOGIST: CPT

## 2025-01-07 PROCEDURE — 2580000003 HC RX 258: Performed by: INTERNAL MEDICINE

## 2025-01-07 RX ORDER — FENTANYL CITRATE 50 UG/ML
INJECTION, SOLUTION INTRAMUSCULAR; INTRAVENOUS PRN
Status: DISCONTINUED | OUTPATIENT
Start: 2025-01-07 | End: 2025-01-07 | Stop reason: ALTCHOICE

## 2025-01-07 RX ORDER — ROSUVASTATIN CALCIUM 5 MG/1
5 TABLET, COATED ORAL DAILY
COMMUNITY

## 2025-01-07 RX ORDER — CHOLECALCIFEROL (VITAMIN D3) 1250 MCG
1 CAPSULE ORAL WEEKLY
COMMUNITY

## 2025-01-07 RX ORDER — GLYCOPYRROLATE 0.2 MG/ML
0.1 INJECTION INTRAMUSCULAR; INTRAVENOUS ONCE
Status: DISCONTINUED | OUTPATIENT
Start: 2025-01-07 | End: 2025-01-07 | Stop reason: HOSPADM

## 2025-01-07 RX ORDER — DICLOFENAC SODIUM 75 MG/1
75 TABLET, DELAYED RELEASE ORAL 2 TIMES DAILY
COMMUNITY

## 2025-01-07 RX ORDER — FENTANYL CITRATE 50 UG/ML
100 INJECTION, SOLUTION INTRAMUSCULAR; INTRAVENOUS
Status: DISCONTINUED | OUTPATIENT
Start: 2025-01-07 | End: 2025-01-07 | Stop reason: HOSPADM

## 2025-01-07 RX ORDER — EPINEPHRINE IN SOD CHLOR,ISO 1 MG/10 ML
1 SYRINGE (ML) INTRAVENOUS ONCE
Status: DISCONTINUED | OUTPATIENT
Start: 2025-01-07 | End: 2025-01-07 | Stop reason: HOSPADM

## 2025-01-07 RX ORDER — MIDAZOLAM HYDROCHLORIDE 5 MG/5ML
5 INJECTION, SOLUTION INTRAMUSCULAR; INTRAVENOUS
Status: DISCONTINUED | OUTPATIENT
Start: 2025-01-07 | End: 2025-01-07 | Stop reason: HOSPADM

## 2025-01-07 RX ORDER — METOPROLOL SUCCINATE 50 MG/1
50 TABLET, EXTENDED RELEASE ORAL DAILY
COMMUNITY

## 2025-01-07 RX ORDER — METFORMIN HYDROCHLORIDE 500 MG/1
TABLET, EXTENDED RELEASE ORAL
COMMUNITY
Start: 2024-06-04

## 2025-01-07 RX ORDER — SODIUM CHLORIDE 9 MG/ML
INJECTION, SOLUTION INTRAVENOUS CONTINUOUS
Status: DISCONTINUED | OUTPATIENT
Start: 2025-01-07 | End: 2025-01-07 | Stop reason: HOSPADM

## 2025-01-07 RX ORDER — BUDESONIDE AND FORMOTEROL FUMARATE DIHYDRATE 160; 4.5 UG/1; UG/1
2 AEROSOL RESPIRATORY (INHALATION) 2 TIMES DAILY
COMMUNITY

## 2025-01-07 RX ORDER — FLUMAZENIL 0.1 MG/ML
0.2 INJECTION INTRAVENOUS ONCE
Status: DISCONTINUED | OUTPATIENT
Start: 2025-01-07 | End: 2025-01-07 | Stop reason: HOSPADM

## 2025-01-07 RX ORDER — MIDAZOLAM HYDROCHLORIDE 1 MG/ML
INJECTION, SOLUTION INTRAMUSCULAR; INTRAVENOUS PRN
Status: DISCONTINUED | OUTPATIENT
Start: 2025-01-07 | End: 2025-01-07 | Stop reason: ALTCHOICE

## 2025-01-07 RX ORDER — DIPHENHYDRAMINE HYDROCHLORIDE 50 MG/ML
25 INJECTION INTRAMUSCULAR; INTRAVENOUS EVERY 6 HOURS PRN
Status: DISCONTINUED | OUTPATIENT
Start: 2025-01-07 | End: 2025-01-07 | Stop reason: HOSPADM

## 2025-01-07 RX ORDER — SIMETHICONE 40MG/0.6ML
40 SUSPENSION, DROPS(FINAL DOSAGE FORM)(ML) ORAL EVERY 6 HOURS PRN
Status: DISCONTINUED | OUTPATIENT
Start: 2025-01-07 | End: 2025-01-07 | Stop reason: HOSPADM

## 2025-01-07 RX ORDER — NALOXONE HYDROCHLORIDE 0.4 MG/ML
0.4 INJECTION, SOLUTION INTRAMUSCULAR; INTRAVENOUS; SUBCUTANEOUS PRN
Status: DISCONTINUED | OUTPATIENT
Start: 2025-01-07 | End: 2025-01-07 | Stop reason: HOSPADM

## 2025-01-07 RX ADMIN — SODIUM CHLORIDE: 9 INJECTION, SOLUTION INTRAVENOUS at 08:37

## 2025-01-07 ASSESSMENT — PAIN - FUNCTIONAL ASSESSMENT
PAIN_FUNCTIONAL_ASSESSMENT: NONE - DENIES PAIN
PAIN_FUNCTIONAL_ASSESSMENT: ADULT NONVERBAL PAIN SCALE (NPVS)
PAIN_FUNCTIONAL_ASSESSMENT: NONE - DENIES PAIN
PAIN_FUNCTIONAL_ASSESSMENT: 0-10
PAIN_FUNCTIONAL_ASSESSMENT: ADULT NONVERBAL PAIN SCALE (NPVS)
PAIN_FUNCTIONAL_ASSESSMENT: ADULT NONVERBAL PAIN SCALE (NPVS)
PAIN_FUNCTIONAL_ASSESSMENT: 0-10
PAIN_FUNCTIONAL_ASSESSMENT: NONE - DENIES PAIN
PAIN_FUNCTIONAL_ASSESSMENT: ADULT NONVERBAL PAIN SCALE (NPVS)
PAIN_FUNCTIONAL_ASSESSMENT: 0-10
PAIN_FUNCTIONAL_ASSESSMENT: NONE - DENIES PAIN
PAIN_FUNCTIONAL_ASSESSMENT: 0-10
PAIN_FUNCTIONAL_ASSESSMENT: NONE - DENIES PAIN
PAIN_FUNCTIONAL_ASSESSMENT: 0-10
PAIN_FUNCTIONAL_ASSESSMENT: ADULT NONVERBAL PAIN SCALE (NPVS)
PAIN_FUNCTIONAL_ASSESSMENT: NONE - DENIES PAIN
PAIN_FUNCTIONAL_ASSESSMENT: ADULT NONVERBAL PAIN SCALE (NPVS)

## 2025-01-07 ASSESSMENT — PAIN DESCRIPTION - DESCRIPTORS: DESCRIPTORS: ACHING

## 2025-01-07 NOTE — H&P
quadrivalent, preservative free   3/21/2022 12:00:00 AM Shingrix   12/21/2021 12:00:00 AM Sonia     Patient is questioned and examined

## 2025-01-07 NOTE — PRE SEDATION
Sedation Pre-Procedure Note    Patient Name: Adrienne Ballard   YOB: 1970  Room/Bed: ENDO/PL  Medical Record Number: [id number]  Date: 2025   Time: 10:27 AM       Indication:  GERD    Consent: I have discussed with the patient and/or the patient representative the indication, alternatives, and the possible risks and/or complications of the planned procedure and the anesthesia methods. The patient and/or patient representative appear to understand and agree to proceed.    Vital Signs:   Vitals:    25 1015   BP: 135/76   Pulse: 78   Resp: 12   Temp:    SpO2: 93%       Past Medical History:   has a past medical history of Allergic rhinitis, Arthritis, Asthma, Autoimmune disease (HCC), Diabetes mellitus (HCC), Fibromyalgia, GERD (gastroesophageal reflux disease), Migraine, MVP (mitral valve prolapse), and Nausea & vomiting.    Past Surgical History:   has a past surgical history that includes Colonoscopy (N/A, 10/30/2019); orthopedic surgery (Bilateral); orthopedic surgery (Left, 2017); back surgery (); Breast biopsy (Bilateral);  section ( & ); Dilation and curettage of uterus; Cholecystectomy, laparoscopic (); and Total knee arthroplasty (Bilateral, ).    Medications:   Scheduled Meds:    flumazenil  0.2 mg IntraVENous Once    benzocaine   Mouth/Throat 4x Daily    EPINEPHrine  1 mg IntraVENous Once    glycopyrrolate  0.1 mg IntraVENous Once     Continuous Infusions:    sodium chloride 100 mL/hr at 25 0837    fentaNYL      midazolam       PRN Meds: naloxone, simethicone, diphenhydrAMINE  Home Meds:   Prior to Admission medications    Medication Sig Start Date End Date Taking? Authorizing Provider   diclofenac (VOLTAREN) 75 MG EC tablet Take 1 tablet by mouth 2 times daily   Yes Alyssa Cee MD   metFORMIN (GLUCOPHAGE-XR) 500 MG extended release tablet TAKE TWO TABLETS BY MOUTH ONCE DAILY WITH EVENING MEAL 24  Yes Alyssa Cee MD

## 2025-01-07 NOTE — PROCEDURES
diet. Avoid NSAID's. Make a FU appointment at the office. In 2 weeks try to reduce the Omeprazole to 20 mg daily. Repeat EGD as needed if iron deficiency anemia, increased dyspepsia, nausea or vomiting.    Assistant: None    --Marla Samaniego MD on 1/7/2025 at 10:16 AM

## 2025-01-07 NOTE — DISCHARGE INSTRUCTIONS
Adrienne Ballard  [id number]  1970    EGD DISCHARGE INSTRUCTIONS  Discomfort:  Sore throat- throat lozenges or warm salt water gargle  redness at IV site- apply warm compress to area; if redness or soreness persist- contact your physician  Gaseous discomfort- walking, belching will help relieve any discomfort  You may not operate a vehicle until the next day  You may not engage in an occupation involving machinery or appliances until the next day  You may not drink alcoholic beverages until the next day  Avoid making any critical decisions for at least 24 hour    DIET   You may not resume your regular diet. Antireflux diet.    ACTIVITY  You may not resume your normal daily activities   Spend the remainder of the day resting -  avoid any strenuous activity.    CALL M.D.  ANY SIGN OF   Increasing pain, nausea, vomiting  Abdominal distension (swelling)  New increased bleeding (oral or rectal)  Fever (chills)  Pain in chest area  Bloody discharge from nose or mouth  Shortness of breath     You may not take any Advil, Aspirin, Ibuprofen, Motrin, Aleve, or Goody’s for 7 days, preferably ONLY  Tylenol as needed for pain.    Follow-up Instructions:   Follow-up in the office as scheduled or make a follow-up appointment in 2 weeks.     Marla Samaniego MD  January 7, 2025  DISCHARGE SUMMARY from Nurse    PATIENT INSTRUCTIONS:    After general anesthesia or intravenous sedation, for 24 hours or while taking prescription Narcotics:  Limit your activities  Do not drive and operate hazardous machinery  Do not make important personal or business decisions  Do  not drink alcoholic beverages  If you have not urinated within 8 hours after discharge, please contact your surgeon on call.    Report the following to your surgeon:  Excessive pain, swelling, redness or odor of or around the surgical area  Temperature over 100.5  Nausea and vomiting lasting longer than 4 hours or if unable to take medications  Any signs of decreased

## 2025-02-14 RX ORDER — GLYCOPYRROLATE 0.2 MG/ML
0.1 INJECTION INTRAMUSCULAR; INTRAVENOUS ONCE
Status: CANCELLED | OUTPATIENT
Start: 2025-02-14 | End: 2025-02-14

## 2025-02-14 RX ORDER — SIMETHICONE 40MG/0.6ML
40 SUSPENSION, DROPS(FINAL DOSAGE FORM)(ML) ORAL EVERY 6 HOURS PRN
Status: CANCELLED | OUTPATIENT
Start: 2025-02-14

## 2025-02-14 RX ORDER — DIPHENHYDRAMINE HYDROCHLORIDE 50 MG/ML
25 INJECTION INTRAMUSCULAR; INTRAVENOUS EVERY 6 HOURS PRN
Status: CANCELLED | OUTPATIENT
Start: 2025-02-14

## 2025-02-14 RX ORDER — EPINEPHRINE IN SOD CHLOR,ISO 1 MG/10 ML
1 SYRINGE (ML) INTRAVENOUS ONCE
Status: CANCELLED | OUTPATIENT
Start: 2025-02-14 | End: 2025-02-14

## 2025-02-17 ENCOUNTER — HOSPITAL ENCOUNTER (OUTPATIENT)
Facility: HOSPITAL | Age: 55
Setting detail: OUTPATIENT SURGERY
Discharge: HOME | End: 2025-02-17
Attending: INTERNAL MEDICINE | Admitting: INTERNAL MEDICINE
Payer: COMMERCIAL

## 2025-02-17 VITALS
OXYGEN SATURATION: 98 % | DIASTOLIC BLOOD PRESSURE: 74 MMHG | HEIGHT: 66 IN | WEIGHT: 250.9 LBS | HEART RATE: 90 BPM | TEMPERATURE: 97.7 F | BODY MASS INDEX: 40.32 KG/M2 | SYSTOLIC BLOOD PRESSURE: 113 MMHG | RESPIRATION RATE: 20 BRPM

## 2025-02-17 LAB — GLUCOSE BLD STRIP.AUTO-MCNC: 109 MG/DL (ref 70–110)

## 2025-02-17 PROCEDURE — 3600007502: Performed by: INTERNAL MEDICINE

## 2025-02-17 PROCEDURE — 7100000011 HC PHASE II RECOVERY - ADDTL 15 MIN: Performed by: INTERNAL MEDICINE

## 2025-02-17 PROCEDURE — 99152 MOD SED SAME PHYS/QHP 5/>YRS: CPT | Performed by: INTERNAL MEDICINE

## 2025-02-17 PROCEDURE — 3600007512: Performed by: INTERNAL MEDICINE

## 2025-02-17 PROCEDURE — 6360000002 HC RX W HCPCS: Performed by: INTERNAL MEDICINE

## 2025-02-17 PROCEDURE — 7100000010 HC PHASE II RECOVERY - FIRST 15 MIN: Performed by: INTERNAL MEDICINE

## 2025-02-17 PROCEDURE — 2709999900 HC NON-CHARGEABLE SUPPLY: Performed by: INTERNAL MEDICINE

## 2025-02-17 PROCEDURE — 88305 TISSUE EXAM BY PATHOLOGIST: CPT

## 2025-02-17 PROCEDURE — 2580000003 HC RX 258: Performed by: INTERNAL MEDICINE

## 2025-02-17 PROCEDURE — 99153 MOD SED SAME PHYS/QHP EA: CPT | Performed by: INTERNAL MEDICINE

## 2025-02-17 PROCEDURE — 82962 GLUCOSE BLOOD TEST: CPT

## 2025-02-17 RX ORDER — SODIUM CHLORIDE 9 MG/ML
INJECTION, SOLUTION INTRAVENOUS CONTINUOUS
Status: CANCELLED | OUTPATIENT
Start: 2025-02-17

## 2025-02-17 RX ORDER — GLYCOPYRROLATE 0.2 MG/ML
INJECTION INTRAMUSCULAR; INTRAVENOUS PRN
Status: DISCONTINUED | OUTPATIENT
Start: 2025-02-17 | End: 2025-02-17 | Stop reason: ALTCHOICE

## 2025-02-17 RX ORDER — MIDAZOLAM HYDROCHLORIDE 1 MG/ML
INJECTION, SOLUTION INTRAMUSCULAR; INTRAVENOUS PRN
Status: DISCONTINUED | OUTPATIENT
Start: 2025-02-17 | End: 2025-02-17 | Stop reason: ALTCHOICE

## 2025-02-17 RX ORDER — FLUMAZENIL 0.1 MG/ML
0.2 INJECTION INTRAVENOUS ONCE
Status: DISCONTINUED | OUTPATIENT
Start: 2025-02-17 | End: 2025-02-17 | Stop reason: HOSPADM

## 2025-02-17 RX ORDER — FENTANYL CITRATE 50 UG/ML
100 INJECTION, SOLUTION INTRAMUSCULAR; INTRAVENOUS
Status: DISCONTINUED | OUTPATIENT
Start: 2025-02-17 | End: 2025-02-17 | Stop reason: HOSPADM

## 2025-02-17 RX ORDER — SODIUM CHLORIDE 9 MG/ML
INJECTION, SOLUTION INTRAVENOUS CONTINUOUS
Status: DISCONTINUED | OUTPATIENT
Start: 2025-02-17 | End: 2025-02-17 | Stop reason: HOSPADM

## 2025-02-17 RX ORDER — MIDAZOLAM HYDROCHLORIDE 5 MG/5ML
5 INJECTION, SOLUTION INTRAMUSCULAR; INTRAVENOUS
Status: DISCONTINUED | OUTPATIENT
Start: 2025-02-17 | End: 2025-02-17 | Stop reason: HOSPADM

## 2025-02-17 RX ORDER — FENTANYL CITRATE 50 UG/ML
INJECTION, SOLUTION INTRAMUSCULAR; INTRAVENOUS PRN
Status: DISCONTINUED | OUTPATIENT
Start: 2025-02-17 | End: 2025-02-17 | Stop reason: ALTCHOICE

## 2025-02-17 RX ORDER — NALOXONE HYDROCHLORIDE 0.4 MG/ML
0.4 INJECTION, SOLUTION INTRAMUSCULAR; INTRAVENOUS; SUBCUTANEOUS PRN
Status: DISCONTINUED | OUTPATIENT
Start: 2025-02-17 | End: 2025-02-17 | Stop reason: HOSPADM

## 2025-02-17 RX ADMIN — SODIUM CHLORIDE: 9 INJECTION, SOLUTION INTRAVENOUS at 09:47

## 2025-02-17 ASSESSMENT — PAIN - FUNCTIONAL ASSESSMENT
PAIN_FUNCTIONAL_ASSESSMENT: NONE - DENIES PAIN
PAIN_FUNCTIONAL_ASSESSMENT: NONE - DENIES PAIN
PAIN_FUNCTIONAL_ASSESSMENT: 0-10
PAIN_FUNCTIONAL_ASSESSMENT: ADULT NONVERBAL PAIN SCALE (NPVS)
PAIN_FUNCTIONAL_ASSESSMENT: NONE - DENIES PAIN
PAIN_FUNCTIONAL_ASSESSMENT: ADULT NONVERBAL PAIN SCALE (NPVS)

## 2025-02-17 NOTE — DISCHARGE INSTRUCTIONS
Adrienne Ballard  [id number]  1970    COLON DISCHARGE INSTRUCTIONS    Discomfort:  Redness at IV site- apply warm compress to area; if redness or soreness persist- contact your physician  There may be a slight amount of blood passed from the rectum  Gaseous discomfort- walking, belching will help relieve any discomfort  You may not operate a vehicle til the next day.  You may not engage in an occupation involving machinery or appliances til the next day.  You may not drink alcoholic beverages til the next day.    DIET:   High fiber diet.     ACTIVITY:  You may not  resume your normal daily activities til the next day. it is recommended that you spend the remainder of the day resting -  avoid any strenuous activity.    CALL M.D.  IF ANY SIGN OF:   Increasing pain, nausea, vomiting  Abdominal distension (swelling)  New increased bleeding (oral or rectal)  Fever (chills)  Pain in chest area  Bloody discharge from nose or mouth  Shortness of breath    You may not  take any Advil, Aspirin, Ibuprofen, Motrin, Aleve, or Goody’s for 7 days, ONLY  Tylenol as needed for pain.    Post procedure diagnosis:  Internal hemorrhoids. Moderate sigmoid diverticulosis. 8 mm flat polyp in the proximal sigmoid at  35 cm. Hot snared. Slightly redundant colon. With good bowel prep. 13 mm sessile flattened polyp hidden behind a fold in the mid ascending colon, difficult to see, It is hot snared then edges cauterized     Follow-up Instructions:   Your follow up colonoscopy will be in 5 years.    We will notify you the results of your biopsy by letter within 2 weeks.    Marla Samaniego MD  February 17, 2025      DISCHARGE SUMMARY from Nurse    PATIENT INSTRUCTIONS:    After general anesthesia or intravenous sedation, for 24 hours or while taking prescription Narcotics:  Limit your activities  Do not drive and operate hazardous machinery  Do not make important personal or business decisions  Do  not drink alcoholic beverages  If you

## 2025-02-17 NOTE — PRE SEDATION
Sedation Pre-Procedure Note    Patient Name: Adrienne Ballard   YOB: 1970  Room/Bed: ENDO/PL  Medical Record Number: [id number]  Date: 2025   Time: 11:47 AM       Indication:  hx of colon polyps    Consent: I have discussed with the patient and/or the patient representative the indication, alternatives, and the possible risks and/or complications of the planned procedure and the anesthesia methods. The patient and/or patient representative appear to understand and agree to proceed.    Vital Signs:   Vitals:    25 0930   BP: 128/74   Pulse: 79   Resp: 18   Temp: 98 °F (36.7 °C)   SpO2: 96%       Past Medical History:   has a past medical history of Allergic rhinitis, Arthritis, Asthma, Autoimmune disease (HCC), Diabetes mellitus (HCC), Fibromyalgia, GERD (gastroesophageal reflux disease), Hyperlipidemia, Migraine, MVP (mitral valve prolapse), Nausea & vomiting, and PONV (postoperative nausea and vomiting).    Past Surgical History:   has a past surgical history that includes Colonoscopy (N/A, 10/30/2019); orthopedic surgery (Bilateral); orthopedic surgery (Left, 2017); back surgery (); Breast biopsy (Bilateral);  section ( & ); Dilation and curettage of uterus; Cholecystectomy, laparoscopic (); Total knee arthroplasty (Bilateral, ); and Upper gastrointestinal endoscopy (N/A, 2025).    Medications:   Scheduled Meds:    flumazenil  0.2 mg IntraVENous Once     Continuous Infusions:    sodium chloride 100 mL/hr at 25 0947    fentaNYL      midazolam       PRN Meds: naloxone  Home Meds:   Prior to Admission medications    Medication Sig Start Date End Date Taking? Authorizing Provider   diclofenac (VOLTAREN) 75 MG EC tablet Take 1 tablet by mouth 2 times daily   Yes Provider, MD Alyssa   metFORMIN (GLUCOPHAGE-XR) 500 MG extended release tablet TAKE TWO TABLETS BY MOUTH ONCE DAILY WITH EVENING MEAL 24  Yes Provider, MD Alyssa   sertraline (ZOLOFT)

## 2025-02-17 NOTE — H&P
Assessment/Plan  # Detail Type Description    1. Assessment Gastroesophageal reflux disease without esophagitis (K21.9).    Impression Last EGD done 11/25/2019, due now for 5yr EGD Recall.  Taking Omeprazole 20mg QAM.    Patient Plan *EGD Plan: (Schedule 1st)  Will proceed with EGD with Dr. Samaniego, to evaluate upper GI tract for abnormalities, evidence to explain symptoms (of bleeding), and Nuñez's epithelium with biopsies, polypectomy, or dilation as indicated.    *EGD Risks:  Explained risks of procedure to include bleeding, infection, reaction to sedation, and perforation with possible need for admission to the hospital, and in the most extensive of  circumstances, the patient may require surgery. Pt verbalized understanding of these risks and is agreeable with this procedure.    Plan Orders UPPER GI ENDOSCOPY, DIAGNOSIS to be performed.         2. Assessment Diaphragmatic hernia (K44.9).    Impression As seen on last EGD in 2019.         3. Assessment Polyp of stomach and duodenum (K31.7).    Impression As seen on last EGD in 2019.         4. Assessment Personal history of colonic polyps (Z86.010).    Impression Pt of Dr. Ruffin, here for 5yr Recall, for surveillance of colonic adenomas.  _________________________________  *Last colonoscopy was done on 10/30/2019 by Dr. Samaniego @[facility]:  Hx of adenomas. 5yr Recall recommended (Due Now)  _________________________________  Average risk, no FHx of colon cancer. Asymptomatic of GI complaints.   BMI: 41.96 , BM: 3-4x daily.    Patient Plan *C-scope Plan:  Colonoscopy ordered with Dr. Samaniego with Golytely/Gavilyte bowel prep, and Miralax and stool softeners starting 3 days before prep.  -Patient is advised that they should take their aspirin (if prescribed) up until the day of procedure.  -Patient is advised to take Thyroid meds, BP meds, beta blockers, and any cardiac meds the AM of procedure with sip clear liquid after prep.  -Diabetic medication

## 2025-02-17 NOTE — PROCEDURES
[facility]  Colonoscopy Procedure Report  _______________________________________________________  Patient: Adrienne Ballard                                        Attending Physician: JEMMA Samaniego MD    Patient ID: [id number]                                    Referring Physician: Shahida Ruffin MD    Exam Date: 2/17/2025     Introduction: A  54 y.o. female patient, presents for inpatient Colonoscopy    Indications: Pt of Dr. Ruffin, here for 5yr Recall, for surveillance of colonic adenomas.  Last colonoscopy was done on 10/30/2019 by Dr. Samaniego @[facility]: Medium sized internal hemorrhoids. 10 mm triangular sessile polyp at the rectosigmoid junction hot snared. 2 sessile polyps one 8 mm and second 5 mm in the distal sigmoid colon all hot snared. Mild sigmoid diverticulosis. 7 mm sessile flattened polyp in the proximal descending colon hot snared.  no FHx of colon cancer. Asymptomatic of GI complaints. BMI: 41.96 , BM: 3-4x daily.  EGD 1/7/ 2025: Presence of thick secretions in the hypopharynx from post nasal drip. The esophagus is normal and the Z-Line is intact. Small reducible 1.5 to < 2 cm Hiatal hernia. Diaphragmatic opening or notch is located at 40 cm. Small amount of retained green fluid in the proximal stomach. Multiple large and medium sized sessile and semi-pedunculated fundic type polyps 8 to 22 mm. Removed 25 polyps with hot snare and recovered most of them to the outside using the Arellano net for sampling. STOMACH POLYPS, POLYPECTOMY:   FUNDIC GLAND POLYPS.    PMH: HTN, BMI 40.5, sp tubal ligation, Mitral valve prolapse and frequent PVC.    Consent: The benefits, risks, and alternatives to the procedure were discussed and informed consent was obtained from the patient.    Preparation: EKG, pulse, pulse oximetry and blood pressure were monitored throughout the procedure. ASA Classification: Class II- . The heart is an S1-S2 and regular heart rate and rhythm. Lungs are clear

## (undated) DEVICE — SYSTEM SKIN CLSR 22CM DERMBND PRINEO

## (undated) DEVICE — HANDPIECE SET WITH HIGH FLOW TIP AND SUCTION TUBE: Brand: INTERPULSE

## (undated) DEVICE — NDL FLTR TIP 5 MIC 18GX1.5IN --

## (undated) DEVICE — KENDALL RADIOLUCENT FOAM MONITORING ELECTRODE RECTANGULAR SHAPE: Brand: KENDALL

## (undated) DEVICE — 3 BONE CEMENT MIXER: Brand: MIXEVAC

## (undated) DEVICE — DERMABOND SKIN ADH 0.7ML -- DERMABOND ADVANCED 12/BX

## (undated) DEVICE — CANNULA CUSH AD W/ 14FT TBG

## (undated) DEVICE — TOURNIQUET PHLEB W1XL18IN BLU FLAT RL AND BND REUSE FOR IV

## (undated) DEVICE — SOL IRRIGATION INJ NACL 0.9% 500ML BTL

## (undated) DEVICE — TRNQT TEXT 1X18IN BLU LF DISP -- CONVERT TO ITEM 362165

## (undated) DEVICE — APPLIER CLP M L L11.4IN DIA10MM ENDOSCP ROT MULT FOR LIG

## (undated) DEVICE — BLADE SAW 1.19X20X90 MM FOR LG BNE

## (undated) DEVICE — SYRINGE MEDICAL 3ML CLEAR PLASTIC STANDARD NON CONTROL LUERLOCK TIP DISPOSABLE

## (undated) DEVICE — Device

## (undated) DEVICE — MOUTHPIECE ENDOSCP 20X27MM --

## (undated) DEVICE — NEEDLE HYPO 22GA L1.5IN BLK S STL HUB POLYPR SHLD REG BVL

## (undated) DEVICE — KENDALL SCD EXPRESS SLEEVES, KNEE LENGTH, MEDIUM: Brand: KENDALL SCD

## (undated) DEVICE — ROCKER SWITCH PENCIL HOLSTER: Brand: VALLEYLAB

## (undated) DEVICE — STERILE COTTON TIPPED APPLICATORS: Brand: PURITAN

## (undated) DEVICE — TUBING PMP L8FT LNG W/ CONN FOR AR-6400 REDEUCE

## (undated) DEVICE — UNDERCAST PADDING: Brand: DEROYAL

## (undated) DEVICE — BLUNTFILL: Brand: MONOJECT

## (undated) DEVICE — KNEE ARTHROSCOPY III-LF: Brand: MEDLINE INDUSTRIES, INC.

## (undated) DEVICE — STERILE POLYISOPRENE POWDER-FREE SURGICAL GLOVES WITH EMOLLIENT COATING: Brand: PROTEXIS

## (undated) DEVICE — MASTISOL ADHESIVE LIQ 2/3ML

## (undated) DEVICE — PACK PROCEDURE SURG TOT KNEE CUST

## (undated) DEVICE — PEEL-AWAY TOGA, X-LARGE: Brand: FLYTE

## (undated) DEVICE — GARMENT,MEDLINE,DVT,INT,CALF,MED, GEN2: Brand: MEDLINE

## (undated) DEVICE — 4.5 MM INCISOR PLUS STRAIGHT                                    BLADES, POWER/EP-1, VIOLET, PACKAGED                                    6 PER BOX, STERILE

## (undated) DEVICE — SOLUTION IV 1000 ML 0.9 NACL INJ USP EXCEL PLAS CONTAINER

## (undated) DEVICE — SUT VCRL + 0 36IN UR6 VIO --

## (undated) DEVICE — SYR 5ML 1/5 GRAD LL NSAF LF --

## (undated) DEVICE — OPTIFOAM GENTLE SA, POSTOP, 4X12: Brand: MEDLINE

## (undated) DEVICE — Z INACTIVE USE 2720250 BIT DRL 3.2X145MM

## (undated) DEVICE — HOOD, PEEL-AWAY: Brand: FLYTE

## (undated) DEVICE — TROCAR ENDOSCP L100MM DIA11MM DIL TIP STBL SL DISP ENDOPATH

## (undated) DEVICE — MEDI-VAC NON-CONDUCTIVE SUCTION TUBING: Brand: CARDINAL HEALTH

## (undated) DEVICE — SHEET,DRAPE,70X100,STERILE: Brand: MEDLINE

## (undated) DEVICE — TUBE IRRIG L8IN LNG PT W/ CONN FOR PMP SYS REDEUCE

## (undated) DEVICE — BONE WAX WHITE: Brand: BONE WAX WHITE

## (undated) DEVICE — DISPOSABLE SUCTION/IRRIGATOR TUBE SET WITH TIP: Brand: AHTO

## (undated) DEVICE — STRIP,CLOSURE,WOUND,MEDI-STRIP,1/2X4: Brand: MEDLINE

## (undated) DEVICE — SUT VCRL + 1 36IN CT1 VIO --

## (undated) DEVICE — [HIGH FLOW INSUFFLATOR,  DO NOT USE IF PACKAGE IS DAMAGED,  KEEP DRY,  KEEP AWAY FROM SUNLIGHT,  PROTECT FROM HEAT AND RADIOACTIVE SOURCES.]: Brand: PNEUMOSURE

## (undated) DEVICE — EJECTOR SALIVA 6 IN FLX CLR

## (undated) DEVICE — CATHETER IV 22GA L1IN BLU POLYUR STR HUB RADPQ PROTCT +

## (undated) DEVICE — SOLUTION IRRIG 3000ML LAC R FLX CONT

## (undated) DEVICE — NDL PRT INJ NSAF BLNT 18GX1.5 --

## (undated) DEVICE — WRISTBAND ID AD W2.5XL9.5CM RED VYN ADH CLSR UNI-PRINT

## (undated) DEVICE — TRAP SPEC POLYP REM STRNR CLN DSGN MAGNIFYING WIND DISP

## (undated) DEVICE — SUTURE STRATAFIX SZ 1 L14IN ABSRB VLT L36MM MO-4 TAPERPOINT SXPD2B400

## (undated) DEVICE — BLADE SAW W13XL90MM 1.19MM PARA

## (undated) DEVICE — SUT VCRL + 3-0 27IN CT2 UD --

## (undated) DEVICE — (D)STRIP SKN CLSR 0.5X4IN WHT --

## (undated) DEVICE — RIMMED SPEED PIN 45MM STERILE

## (undated) DEVICE — SPONGE GZ W4XL4IN RAYON POLY 4 PLY NONWOVEN FASTER WICKING

## (undated) DEVICE — GARMENT COMPR M FOR 13IN FT INTMIT SGL BLDR HEM FORC II

## (undated) DEVICE — SYRINGE 50ML E/T

## (undated) DEVICE — ENDOCUT SCISSOR TIP, DISPOSABLE: Brand: RENEW

## (undated) DEVICE — SET ADMIN 16ML TBNG L100IN 2 Y INJ SITE IV PIGGY BK DISP

## (undated) DEVICE — SOLUTION IV 500ML 0.9% SOD CHL FLX CONT

## (undated) DEVICE — GLOVE ORANGE PI 7 1/2   MSG9075

## (undated) DEVICE — SUT VCRL + 0 36IN CT1 UD --

## (undated) DEVICE — ENDO CARRY-ON PROCEDURE KIT INCLUDES ENZYMATIC SPONGE, GAUZE, BIOHAZARD LABEL, TRAY, LUBRICANT, DIRTY SCOPE LABEL, WATER LABEL, TRAY, DRAWSTRING PAD, AND DEFENDO 4-PIECE KIT.: Brand: ENDO CARRY-ON PROCEDURE KIT

## (undated) DEVICE — SUT MCRYL + 3-0 27IN PS1 UD --

## (undated) DEVICE — ZIMMER® STERILE DISPOSABLE TOURNIQUET CUFF WITH PROTECTIVE SLEEVE AND PLC, SINGLE PORT, SINGLE BLADDER, 34 IN. (86 CM)

## (undated) DEVICE — NEEDLE HYPO 21GA L1.5IN INTRAMUSCULAR S STL LATCH BVL UP

## (undated) DEVICE — SYRINGE MED 5ML STD CLR PLAS LUERLOCK TIP N CTRL DISP

## (undated) DEVICE — MAJ-1414 SINGLE USE ADPATER BIOPSY VALV: Brand: SINGLE USE ADAPTOR BIOPSY VALVE

## (undated) DEVICE — SINGLE PORT MANIFOLD: Brand: NEPTUNE 2

## (undated) DEVICE — STERILE POLYISOPRENE POWDER-FREE SURGICAL GLOVES: Brand: PROTEXIS

## (undated) DEVICE — SOL INJ L R 1000ML BG --

## (undated) DEVICE — ERBE NESSY®PLATE 170 SPLIT; 168CM²; CABLE 3M: Brand: ERBE

## (undated) DEVICE — TROCAR ENDOSCP SHFT L100MM DIA5MM DIL TIP ENDOPATH XCEL

## (undated) DEVICE — SOL IRR NACL 0.9% 500ML POUR --

## (undated) DEVICE — TOWEL,OR,DSP,ST,BLUE,STD,4/PK,20PK/CS: Brand: MEDLINE

## (undated) DEVICE — RIMMED SPEED PIN 30MM STERILE

## (undated) DEVICE — SLEEVE TRCR L100MM DIA5MM UNIV STBL FOR BLDELSS DIL TIP

## (undated) DEVICE — TUBING, SUCTION, 1/4" X 12', STRAIGHT: Brand: MEDLINE

## (undated) DEVICE — KENDALL SCD EXPRESS FOOT CUFF, MEDIUM: Brand: KENDALL SCD

## (undated) DEVICE — (D)PREP SKN CHLRAPRP APPL 26ML -- CONVERT TO ITEM 371833

## (undated) DEVICE — HEX-LOCKING BLADE ELECTRODE: Brand: EDGE

## (undated) DEVICE — GLOVE SURG SZ 75 L12IN FNGR THK79MIL GRN LTX FREE

## (undated) DEVICE — SYR 10ML CTRL LR LCK NSAF LF --

## (undated) DEVICE — Device: Brand: SINGLE USE ELECTROSURGICAL SNARE SD-400

## (undated) DEVICE — PREP SKN CHLRAPRP APL 26ML STR --

## (undated) DEVICE — TISSUE RETRIEVAL SYSTEM: Brand: INZII RETRIEVAL SYSTEM

## (undated) DEVICE — NON RIMMED SPEED PIN 65MM STERILE

## (undated) DEVICE — SYR 3ML LL TIP 1/10ML GRAD --

## (undated) DEVICE — SNARE POLYP SM W13MMXL240CM SHTH DIA2.4MM OVL FLX DISP

## (undated) DEVICE — TRAP SPEC COLL POLYP POLYSTYR --

## (undated) DEVICE — GLOVE ORANGE PI 8 1/2   MSG9085

## (undated) DEVICE — SUTURE ETHLN SZ 4-0 L18IN NONABSORBABLE BLK L19MM PS-2 3/8 1667H

## (undated) DEVICE — SUT MONOCRYL PLUS UD 4-0 --

## (undated) DEVICE — 3L THIN WALL CAN: Brand: CRD

## (undated) DEVICE — SYRINGE MED 50ML LUERSLIP TIP

## (undated) DEVICE — SOLUTION LACTATED RINGERS INJECTION USP

## (undated) DEVICE — ELECTRODE ES 36CM LAP WIRE J HK COAT DISPOSABLE CLEANCOAT

## (undated) DEVICE — CATH SUC CTRL PRT TRIFLO 14FR --

## (undated) DEVICE — NEEDLE INSUF L120MM DIA2MM DISP FOR PNEUMOPERI ENDOPATH

## (undated) DEVICE — MOUTHPIECE ENDOSCP L CTRL OPN AND SIDE PORTS DISP

## (undated) DEVICE — CATH IV SAFE STR 22GX1IN BLU -- PROTECTIV PLUS

## (undated) DEVICE — SPONGE GZ W4XL4IN COT 12 PLY TYP VII WVN C FLD DSGN

## (undated) DEVICE — SHEET,DRAPE,70X85,STERILE: Brand: MEDLINE

## (undated) DEVICE — DEVON™ KNEE AND BODY STRAP 60" X 3" (1.5 M X 7.6 CM): Brand: DEVON

## (undated) DEVICE — REM POLYHESIVE ADULT PATIENT RETURN ELECTRODE: Brand: VALLEYLAB